# Patient Record
Sex: FEMALE | Race: WHITE | HISPANIC OR LATINO
[De-identification: names, ages, dates, MRNs, and addresses within clinical notes are randomized per-mention and may not be internally consistent; named-entity substitution may affect disease eponyms.]

---

## 2017-11-21 ENCOUNTER — APPOINTMENT (OUTPATIENT)
Dept: ORTHOPEDIC SURGERY | Facility: CLINIC | Age: 47
End: 2017-11-21
Payer: MEDICARE

## 2017-11-21 VITALS
WEIGHT: 180 LBS | HEART RATE: 84 BPM | SYSTOLIC BLOOD PRESSURE: 117 MMHG | BODY MASS INDEX: 31.89 KG/M2 | HEIGHT: 63 IN | DIASTOLIC BLOOD PRESSURE: 77 MMHG

## 2017-11-21 DIAGNOSIS — M47.817 SPONDYLOSIS W/OUT MYELOPATHY OR RADICULOPATHY, LUMBOSACRAL REGION: ICD-10-CM

## 2017-11-21 PROCEDURE — 72100 X-RAY EXAM L-S SPINE 2/3 VWS: CPT

## 2017-11-21 PROCEDURE — 99214 OFFICE O/P EST MOD 30 MIN: CPT

## 2017-11-25 ENCOUNTER — FORM ENCOUNTER (OUTPATIENT)
Age: 47
End: 2017-11-25

## 2017-11-26 ENCOUNTER — APPOINTMENT (OUTPATIENT)
Dept: MRI IMAGING | Facility: CLINIC | Age: 47
End: 2017-11-26
Payer: MEDICARE

## 2017-11-26 ENCOUNTER — OUTPATIENT (OUTPATIENT)
Dept: OUTPATIENT SERVICES | Facility: HOSPITAL | Age: 47
LOS: 1 days | End: 2017-11-26
Payer: MEDICARE

## 2017-11-26 DIAGNOSIS — Z98.1 ARTHRODESIS STATUS: ICD-10-CM

## 2017-11-26 PROCEDURE — 72148 MRI LUMBAR SPINE W/O DYE: CPT | Mod: 26

## 2017-11-26 PROCEDURE — 72148 MRI LUMBAR SPINE W/O DYE: CPT

## 2017-12-06 ENCOUNTER — APPOINTMENT (OUTPATIENT)
Dept: ORTHOPEDIC SURGERY | Facility: CLINIC | Age: 47
End: 2017-12-06
Payer: MEDICARE

## 2017-12-06 VITALS
WEIGHT: 180 LBS | HEART RATE: 79 BPM | DIASTOLIC BLOOD PRESSURE: 80 MMHG | SYSTOLIC BLOOD PRESSURE: 122 MMHG | BODY MASS INDEX: 31.89 KG/M2 | HEIGHT: 63 IN

## 2017-12-06 DIAGNOSIS — M70.61 TROCHANTERIC BURSITIS, RIGHT HIP: ICD-10-CM

## 2017-12-06 PROCEDURE — 99213 OFFICE O/P EST LOW 20 MIN: CPT | Mod: 25

## 2017-12-06 PROCEDURE — 20605 DRAIN/INJ JOINT/BURSA W/O US: CPT | Mod: 50

## 2017-12-06 RX ORDER — FLUOXETINE HYDROCHLORIDE 20 MG/1
20 CAPSULE ORAL
Qty: 15 | Refills: 0 | Status: DISCONTINUED | COMMUNITY
Start: 2017-09-25 | End: 2017-12-06

## 2017-12-06 RX ORDER — PREDNISONE 5 MG/1
5 TABLET ORAL
Qty: 28 | Refills: 0 | Status: DISCONTINUED | COMMUNITY
Start: 2017-09-20 | End: 2017-12-06

## 2017-12-06 RX ORDER — CYCLOBENZAPRINE HYDROCHLORIDE 5 MG/1
5 TABLET, FILM COATED ORAL
Qty: 30 | Refills: 0 | Status: DISCONTINUED | COMMUNITY
Start: 2017-06-22 | End: 2017-12-06

## 2017-12-06 RX ORDER — TOPIRAMATE 50 MG/1
50 TABLET, FILM COATED ORAL
Qty: 60 | Refills: 0 | Status: DISCONTINUED | COMMUNITY
Start: 2017-08-28 | End: 2017-12-06

## 2017-12-06 RX ORDER — TOPIRAMATE 100 MG/1
100 CAPSULE, EXTENDED RELEASE ORAL
Qty: 30 | Refills: 0 | Status: DISCONTINUED | COMMUNITY
Start: 2017-07-07 | End: 2017-12-06

## 2017-12-06 RX ORDER — IBUPROFEN 600 MG/1
600 TABLET, FILM COATED ORAL
Qty: 30 | Refills: 0 | Status: DISCONTINUED | COMMUNITY
Start: 2017-11-15 | End: 2017-12-06

## 2017-12-06 RX ORDER — IBUPROFEN 800 MG/1
800 TABLET, FILM COATED ORAL
Qty: 30 | Refills: 0 | Status: DISCONTINUED | COMMUNITY
Start: 2017-11-14 | End: 2017-12-06

## 2017-12-06 RX ORDER — METHOCARBAMOL 500 MG/1
500 TABLET, FILM COATED ORAL
Qty: 36 | Refills: 0 | Status: DISCONTINUED | COMMUNITY
Start: 2017-11-14 | End: 2017-12-06

## 2017-12-06 RX ORDER — DULOXETINE HYDROCHLORIDE 30 MG/1
30 CAPSULE, DELAYED RELEASE PELLETS ORAL
Qty: 28 | Refills: 0 | Status: DISCONTINUED | COMMUNITY
Start: 2017-09-20 | End: 2017-12-06

## 2017-12-06 RX ORDER — METHYLPREDNISOLONE 4 MG/1
4 TABLET ORAL
Qty: 21 | Refills: 0 | Status: DISCONTINUED | COMMUNITY
Start: 2017-11-21 | End: 2017-12-06

## 2017-12-06 RX ORDER — FLUOXETINE HYDROCHLORIDE 40 MG/1
40 CAPSULE ORAL
Qty: 30 | Refills: 0 | Status: DISCONTINUED | COMMUNITY
Start: 2017-06-19 | End: 2017-12-06

## 2017-12-06 RX ORDER — BUPROPION HYDROCHLORIDE 100 MG/1
100 TABLET, FILM COATED, EXTENDED RELEASE ORAL
Qty: 30 | Refills: 0 | Status: DISCONTINUED | COMMUNITY
Start: 2017-06-19 | End: 2017-12-06

## 2017-12-06 RX ORDER — AMOXICILLIN AND CLAVULANATE POTASSIUM 875; 125 MG/1; MG/1
875-125 TABLET, COATED ORAL
Qty: 10 | Refills: 0 | Status: DISCONTINUED | COMMUNITY
Start: 2017-07-24 | End: 2017-12-06

## 2018-02-13 ENCOUNTER — APPOINTMENT (OUTPATIENT)
Dept: ORTHOPEDIC SURGERY | Facility: CLINIC | Age: 48
End: 2018-02-13

## 2019-09-20 ENCOUNTER — APPOINTMENT (OUTPATIENT)
Dept: ORTHOPEDIC SURGERY | Facility: CLINIC | Age: 49
End: 2019-09-20
Payer: MEDICARE

## 2019-09-20 VITALS
BODY MASS INDEX: 31.89 KG/M2 | SYSTOLIC BLOOD PRESSURE: 135 MMHG | HEART RATE: 88 BPM | HEIGHT: 63 IN | DIASTOLIC BLOOD PRESSURE: 80 MMHG | WEIGHT: 180 LBS

## 2019-09-20 DIAGNOSIS — Z82.69 FAMILY HISTORY OF OTHER DISEASES OF THE MUSCULOSKELETAL SYSTEM AND CONNECTIVE TISSUE: ICD-10-CM

## 2019-09-20 PROCEDURE — 72100 X-RAY EXAM L-S SPINE 2/3 VWS: CPT

## 2019-09-20 PROCEDURE — 99214 OFFICE O/P EST MOD 30 MIN: CPT

## 2019-09-20 NOTE — PHYSICAL EXAM
[de-identified] : CONSTITUTIONAL: The patient is a very pleasant individual who is well-nourished and who appears stated age.\par PSYCHIATRIC: The patient is alert and oriented X 3 and in no apparent distress, and participates with orthopedic evaluation well.\par HEAD: Atraumatic and is nonsyndromic in appearance.\par EENT: No visible thyromegaly, EOMI.\par RESPIRATORY: Respiratory rate is regular, not dyspneic on examination.\par LYMPHATICS: There is no inguinal lymphadenopathy\par INTEGUMENTARY: Skin is clean, dry, and intact about the bilateral lower extremities and lumbar spine.\par VASCULAR: There is brisk capillary refill about the bilateral lower extremities.\par NEUROLOGIC: There are no pathologic reflexes. There is no decrease in sensation of the bilateral lower extremities on Wartenberg pinwheel examination. Deep tendon reflexes are well maintained at 2+/4 of the bilateral lower extremities and are symmetric. Left sciatica type symptoms.\par MUSCULOSKELETAL: There is no visible muscular atrophy. Manual motor strength is well maintained in the bilateral lower extremities. Range of motion of lumbar spine is well maintained. The patient ambulates in a non-myelopathic manner. Positive tension sign and straight leg raise on left. Quad extension, ankle dorsiflexion, EHL, plantar flexion, and ankle eversion are well preserved. Normal secondary orthopaedic exam of bilateral hips, greater trochanteric area, knees and ankles.\par LLE pain.  [de-identified] : 4 view x ray of the lumbar spine taken today shows L5-S1 fusion. Mild progression of adjacent level disease.

## 2019-09-20 NOTE — ADDENDUM
[FreeTextEntry1] : Documented by Geetha Mac acting as a scribe for Dr. Humphrey Bhagat. 09/20/2019\par \par All medical record entries made by the Scribe were at my, Dr. Humphrey Bhagat, direction and personally dictated by me on 09/20/2019. I have reviewed the chart and agree that the record accurately reflects my personal performance of the history, physical exam, assessment and plan. I have also personally directed, reviewed, and agreed with the chart.

## 2019-09-20 NOTE — DISCUSSION/SUMMARY
[de-identified] : Based upon failure of medical massage, oral medication, and injection therapy a lumbar MRI has been ordered secondary to persistent pain and is medically necessary to determine a further treatment plan as a result of worsening sciatic pain and worsening DDD/ adjacent level disease. Injection therapy versus spinal surgery. The pt will follow up once MRI has been obtained.\par

## 2019-09-20 NOTE — REVIEW OF SYSTEMS
[Joint Pain] : joint pain [Fever] : no fever [Chills] : no chills [Cough] : no cough [SOB on Exertion] : no shortness of breath on exertion
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
## 2019-09-27 ENCOUNTER — FORM ENCOUNTER (OUTPATIENT)
Age: 49
End: 2019-09-27

## 2019-09-28 ENCOUNTER — APPOINTMENT (OUTPATIENT)
Dept: MRI IMAGING | Facility: CLINIC | Age: 49
End: 2019-09-28
Payer: MEDICARE

## 2019-09-28 ENCOUNTER — OUTPATIENT (OUTPATIENT)
Dept: OUTPATIENT SERVICES | Facility: HOSPITAL | Age: 49
LOS: 1 days | End: 2019-09-28
Payer: MEDICARE

## 2019-09-28 DIAGNOSIS — M51.26 OTHER INTERVERTEBRAL DISC DISPLACEMENT, LUMBAR REGION: ICD-10-CM

## 2019-09-28 PROCEDURE — 72148 MRI LUMBAR SPINE W/O DYE: CPT

## 2019-09-28 PROCEDURE — 72148 MRI LUMBAR SPINE W/O DYE: CPT | Mod: 26

## 2019-10-31 ENCOUNTER — APPOINTMENT (OUTPATIENT)
Dept: ORTHOPEDIC SURGERY | Facility: CLINIC | Age: 49
End: 2019-10-31
Payer: MEDICARE

## 2019-10-31 VITALS
HEIGHT: 63 IN | SYSTOLIC BLOOD PRESSURE: 130 MMHG | HEART RATE: 81 BPM | BODY MASS INDEX: 31.89 KG/M2 | WEIGHT: 180 LBS | DIASTOLIC BLOOD PRESSURE: 85 MMHG

## 2019-10-31 DIAGNOSIS — Z80.9 FAMILY HISTORY OF MALIGNANT NEOPLASM, UNSPECIFIED: ICD-10-CM

## 2019-10-31 PROCEDURE — 99214 OFFICE O/P EST MOD 30 MIN: CPT

## 2019-10-31 NOTE — DISCUSSION/SUMMARY
[de-identified] : I have recommended that the pt continue with a conservative treatment plan. Pt has been referred to physical therapy for decreased pain modalities, core strengthening modalities and physical modalities. We also spoke about the benefits of using heat, ice, and Bengay cream. Home exercises such as stretching, weight bearing exercises, and aerobic conditioning were encouraged. She may follow up 4-6 months/PRN for repeat clinical evaluation/ surgical conservation regarding adjacent level disease. \par

## 2019-10-31 NOTE — PHYSICAL EXAM
[de-identified] : CONSTITUTIONAL: The patient is a very pleasant individual who is well-nourished and who appears stated age.\par PSYCHIATRIC: The patient is alert and oriented X 3 and in no apparent distress, and participates with orthopedic evaluation well.\par HEAD: Atraumatic and is nonsyndromic in appearance.\par EENT: No visible thyromegaly, EOMI.\par RESPIRATORY: Respiratory rate is regular, not dyspneic on examination.\par LYMPHATICS: There is no inguinal lymphadenopathy\par INTEGUMENTARY: Skin is clean, dry, and intact about the bilateral lower extremities and lumbar spine.\par VASCULAR: There is brisk capillary refill about the bilateral lower extremities.\par NEUROLOGIC: There are no pathologic reflexes. There is no decrease in sensation of the bilateral lower extremities on Wartenberg pinwheel examination. Deep tendon reflexes are well maintained at 2+/4 of the bilateral lower extremities and are symmetric. Left sciatica type symptoms.\par MUSCULOSKELETAL: There is no visible muscular atrophy. Manual motor strength is well maintained in the bilateral lower extremities. Range of motion of lumbar spine is well maintained. The patient ambulates in a non-myelopathic manner. Positive tension sign and straight leg raise on left. Quad extension, ankle dorsiflexion, EHL, plantar flexion, and ankle eversion are well preserved. Normal secondary orthopaedic exam of bilateral hips, greater trochanteric area, knees and ankles.\par Mechanically oriented low back pain. Chronic LLE radiculopathy. [de-identified] : MRI of lumbar spine taken at [facility] 10/01/19 shows L5-S1 fusion with some mild L4-L5 DDD/adjacent level disease.
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
## 2021-01-05 ENCOUNTER — APPOINTMENT (OUTPATIENT)
Dept: ORTHOPEDIC SURGERY | Facility: CLINIC | Age: 51
End: 2021-01-05
Payer: COMMERCIAL

## 2021-01-05 VITALS
BODY MASS INDEX: 33.66 KG/M2 | DIASTOLIC BLOOD PRESSURE: 85 MMHG | HEART RATE: 81 BPM | HEIGHT: 63 IN | SYSTOLIC BLOOD PRESSURE: 123 MMHG | WEIGHT: 190 LBS

## 2021-01-05 PROCEDURE — 99214 OFFICE O/P EST MOD 30 MIN: CPT

## 2021-01-05 PROCEDURE — 72100 X-RAY EXAM L-S SPINE 2/3 VWS: CPT

## 2021-01-05 PROCEDURE — 99072 ADDL SUPL MATRL&STAF TM PHE: CPT

## 2021-01-05 NOTE — DISCUSSION/SUMMARY
[de-identified] : A CAT scan has been ordered and is medically necessary due to persistent pain, failure of conservative measures such as PT and injection therapy, and to further assess the cause of these LE complaints. CAT scan will help guide treatment plan, possible surgical intervention vs injection therapy with pain management. We discussed the 30% chance of improvement with surgical intervention and the patient wishes to think about her current options. The patient will follow up in three weeks for repeat clinical assessment.

## 2021-01-05 NOTE — ADDENDUM
[FreeTextEntry1] : Documented by Ethan Dewitt acting as a scribe for Dee Horton  on 08/20/2020. All medical record entries made by the Scribe were at my, Dee Horton , direction and personally dictated by me on 08/20/2020 . I have reviewed the chart and agree that the record accurately reflects my personal performance of the history, physical exam, assessment and plan. I have also personally directed, reviewed, and agreed with the chart.

## 2021-01-05 NOTE — PHYSICAL EXAM
[Obese] : obese [Poor Appearance] : well-appearing [Acute Distress] : not in acute distress [de-identified] : CONSTITUTIONAL: The patient is a very pleasant individual who is well-nourished and who appears stated age.\par PSYCHIATRIC: The patient is alert and oriented X 3 and in no apparent distress, and participates with orthopedic evaluation well.\par HEAD: Atraumatic and is nonsyndromic in appearance.\par EENT: No visible thyromegaly, EOMI.\par RESPIRATORY: Respiratory rate is regular, not dyspneic on examination.\par LYMPHATICS: There is no inguinal lymphadenopathy\par INTEGUMENTARY: Skin is clean, dry, and intact about the bilateral lower extremities and lumbar spine.\par VASCULAR: There is brisk capillary refill about the bilateral lower extremities.\par NEUROLOGIC: There are no pathologic reflexes. There is no decrease in sensation of the bilateral lower extremities on Wartenberg pinwheel examination. Deep tendon reflexes are well maintained at 2+/4 of the bilateral lower extremities and are symmetric.\par MUSCULOSKELETAL: There is no visible muscular atrophy. Manual motor strength is well maintained in the bilateral lower extremities. The patient ambulates in a non-myelopathic manner. Positive tension sign bilaterally and straight leg raise bilaterally. Quad extension, ankle dorsiflexion, EHL, plantar flexion, and ankle eversion are well preserved. Normal secondary orthopaedic exam of bilateral hips, greater trochanteric area, knees and ankles. Tenderness with palpation to the lower lumbar region. Right greater trochanteric bursitis. Pain with forward flexion greater than 30 °.  [de-identified] : MRI of the lumbar spine taken at [facility] on 09/28/2019 demonstrates mild adjacent level disease at L4-L5, some advancement of L4-L5 lumbar degenerative disc disease when compared to 2014. Mild advancement from 2016 and 2017, this appears to be the natural progression of adjacent level disease. \par \par Xray of the lumbar spine taken 01/05/2021 demonstrates lumbar fusion L5-S1, appears to be well fused. Compared to 2019 there appears to be progression o lumbar degenerative disc disease. When compared to 2015 images taken immediately post operatively there appear to be progression of degenerative disc disease as well. \par \par MRI of the lumbar spine taken at [facility] demonstrates well positioned L5-S1 fusion with adjacent level disease, no foraminal stenosis.
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
## 2021-09-02 ENCOUNTER — APPOINTMENT (OUTPATIENT)
Dept: ORTHOPEDIC SURGERY | Facility: CLINIC | Age: 51
End: 2021-09-02
Payer: MEDICARE

## 2021-09-02 VITALS
HEIGHT: 63 IN | SYSTOLIC BLOOD PRESSURE: 112 MMHG | BODY MASS INDEX: 33.66 KG/M2 | WEIGHT: 190 LBS | HEART RATE: 81 BPM | DIASTOLIC BLOOD PRESSURE: 79 MMHG

## 2021-09-02 PROCEDURE — 72100 X-RAY EXAM L-S SPINE 2/3 VWS: CPT

## 2021-09-02 PROCEDURE — 99215 OFFICE O/P EST HI 40 MIN: CPT

## 2021-09-02 NOTE — ADDENDUM
[FreeTextEntry1] : Documented by Ethan Dewitt acting as a scribe for Dr. Humphrey Bhagat on 09/02/2021. All medical record entries made by the Scribe were at my, Dr. Humphrey Bhagat, direction and personally dictated by me on 09/02/2021 . I have reviewed the chart and agree that the record accurately reflects my personal performance of the history, physical exam, assessment and plan. I have also personally directed, reviewed, and agreed with the chart.

## 2021-09-02 NOTE — DISCUSSION/SUMMARY
[de-identified] : We talked about the nature of the condition and treatment options. Anticipatory guidance regarding mechanically oriented lower back pain was given. \par A lumbar MRI and CT scan has been ordered and is medically necessary due to persistent pain, failure of conservative measures such as physical therapy and injection therapy since 07- with no relief, further assess adjacent level disease, assist in the decision of fusion extension, and to further assess the cause of this mechanically oriented lower back pain. MRI will help guide treatment plan, possible surgical intervention vs injection therapy with pain management. The patient will follow up after the MRI results have been obtained. \par \par Prior to appointment and during encounter with patient extensive medical records were reviewed including but not limited to, hospital records, out patient records, imaging results, and lab data. During this appointment the patient was examined, diagnoses were discussed and explained in a face to face manner. In addition extensive time was spent reviewing aforementioned diagnostic studies. Counseling including abnormal image results, differential diagnoses, treatment options, risk and benefits, lifestyle changes, current condition, and current medications was performed. Patient's comments, questions, and concerns were address and patient verbalized understanding. Based on this patient's presentation at our office, which is an orthopedic spine surgeon's office, this patient inherently / intrinsically has a risk, however minute, of developing  issues such as Cauda equina syndrome, bowel and bladder changes, or progression of motor or neurological deficits such as paralysis which may be permanent. \par \par Based on persistent pain and failure of conservative care I believe an extension of lumbar fusion may be warranted however this is pending imaging results. Anatomic models, Xrays, CT scans/MRI’s were utilized to provide a firm understanding of their surgical plan. Patient is aware that surgery is elective in nature and he choosing to proceed with surgery. Risks, benefits, alternatives were discussed and all questions, comments and concerns were encouraged and answered to the patient's satisfaction. The statistical probability of improvement was discussed at length as well as post surgical course. Literature from [employer] spine society was provided to the patient regarding the specific type of surgery as well as a 5 page written surgical consent which the patient will need to sign and return to the office prior to surgical date. Consent forms highlight specific complications related to the complex nature of spinal surgery\par  \par Risks of lumbar surgery include: persistent pain, adjacent segment disease (which will require more surgery in future), dural tears, neurologic injury, and wound healing complications\par  \par Benefits of lumbar surgery include Improved neurologic and pain score\par  \par We also discussed with the patient complications of incisions directly related to obesity, diabetes, previous wound complications or post-surgical wound infections, smoking, neuropathy, and chronic anticoagulation. This risk has been specifically discussed and the patient will discuss modifiable risk factors to be optimized prior to surgical management. A multimodality approach of primary care physician, and medicine subspecialist will be utilized to optimize medical risk factors.\par  \par If patient is a smoker, discontinuation of smoking was advised and must be accomplished 6-8 weeks prior to surgery date. Patient was advised that help with quitting smoking is available through [facility] Smoker's Quit Line and phone number/website was provided, or patient can ask assistance from primary care provider. Elective surgery will not be performed unless patient complies with this policy.\par \par Medical comorbidities including but not limited to diabetes, coronary artery disease, renal insufficiency, uncontrolled hypertension, rheumatoid arthritis, auto-immune disorders, COPD/asthma and/or history of radiation, chemotherapy, being on anticoagulants, chronic steroids, immune modulators, have increased statistical chance of leading to increased hospital stay, protracted recovery period, need for acute or subacute rehabilitation post-operative. There can be increased probability of post-surgical and medical complications including but not limited to surgical site infection, need for revision surgery, deep vein thrombosis, pulmonary embolism, exacerbation of COPD/asthma, pneumonia, UTI, urinary retention, and ileus.

## 2021-09-02 NOTE — PHYSICAL EXAM
[Obese] : obese [Poor Appearance] : well-appearing [Acute Distress] : not in acute distress [de-identified] : CONSTITUTIONAL: The patient is a very pleasant individual who is well-nourished and who appears stated age.\par PSYCHIATRIC: The patient is alert and oriented X 3 and in no apparent distress, and participates with orthopedic evaluation well.\par HEAD: Atraumatic and is nonsyndromic in appearance.\par EENT: No visible thyromegaly, EOMI.\par RESPIRATORY: Respiratory rate is regular, not dyspneic on examination.\par LYMPHATICS: There is no inguinal lymphadenopathy\par INTEGUMENTARY: Skin is clean, dry, and intact about the bilateral lower extremities and lumbar spine.\par VASCULAR: There is brisk capillary refill about the bilateral lower extremities.\par NEUROLOGIC: There are no pathologic reflexes. There is no decrease in sensation of the bilateral lower extremities on Wartenberg pinwheel examination. Deep tendon reflexes are well maintained at 2+/4 of the bilateral lower extremities and are symmetric.\par MUSCULOSKELETAL: There is no visible muscular atrophy. Manual motor strength is well maintained in the bilateral lower extremities. mechanically oriented lower back pain . The patient ambulates in a non-myelopathic manner. Negative tension sign and straight leg raise bilaterally. Quad extension, ankle dorsiflexion, EHL, plantar flexion, and ankle eversion are well preserved. Normal secondary orthopaedic exam of bilateral hips, greater trochanteric area, knees and ankles. No pain with internal or external rotation of the bilateral hips.  [de-identified] : MRI of the lumbar spine taken at [facility] on 09/28/2019 demonstrates mild adjacent level disease at L4-L5, some advancement of L4-L5 lumbar degenerative disc disease when compared to 2014. Mild advancement from 2016 and 2017, this appears to be the natural progression of adjacent level disease. \par \par Xray of the lumbar spine taken 01/05/2021 demonstrates lumbar fusion L5-S1, appears to be well fused. Compared to 2019 there appears to be progression o lumbar degenerative disc disease. When compared to 2015 images taken immediately post operatively there appear to be progression of degenerative disc disease as well. \par \par MRI of the lumbar spine taken at [facility] demonstrates well positioned L5-S1 fusion with adjacent level disease, no foraminal stenosis.\par \par Xray of the lumbar spine taken 09/02/2021 demonstrates S/p L5-S1 fusion that is well fused anteriorly. Appears to be adjacent level disease. Compared to 2014 there is significant change in L4-L5 adjacent level disease and significant maturing of the L5-S1 fusion.
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
## 2021-09-23 NOTE — HISTORY OF PRESENT ILLNESS
[de-identified] : 51 year old F Presents for follow up evaluation of an acute exacerbation of chronic  [Ataxia] : no ataxia [Incontinence] : no incontinence [Loss of Dexterity] : good dexterity [Urinary Ret.] : no urinary retention

## 2021-09-23 NOTE — DISCUSSION/SUMMARY
[de-identified] : We talked about the nature of the condition and treatment options. Anticipatory guidance regarding \par \par Prior to appointment and during encounter with patient extensive medical records were reviewed including but not limited to, hospital records, out patient records, imaging results, and lab data. During this appointment the patient was examined, diagnoses were discussed and explained in a face to face manner. In addition extensive time was spent reviewing aforementioned diagnostic studies. Counseling including abnormal image results, differential diagnoses, treatment options, risk and benefits, lifestyle changes, current condition, and current medications was performed. Patient's comments, questions, and concerns were address and patient verbalized understanding. Based on this patient's presentation at our office, which is an orthopedic spine surgeon's office, this patient inherently / intrinsically has a risk, however minute, of developing  issues such as Cauda equina syndrome, bowel and bladder changes, or progression of motor or neurological deficits such as paralysis which may be permanent.

## 2021-09-23 NOTE — PHYSICAL EXAM
[Poor Appearance] : well-appearing [Acute Distress] : not in acute distress [Obese] : obese [de-identified] : CONSTITUTIONAL: The patient is a very pleasant individual who is well-nourished and who appears stated age.\par PSYCHIATRIC: The patient is alert and oriented X 3 and in no apparent distress, and participates with orthopedic evaluation well.\par HEAD: Atraumatic and is nonsyndromic in appearance.\par EENT: No visible thyromegaly, EOMI.\par RESPIRATORY: Respiratory rate is regular, not dyspneic on examination.\par LYMPHATICS: There is no inguinal lymphadenopathy\par INTEGUMENTARY: Skin is clean, dry, and intact about the bilateral lower extremities and lumbar spine.\par VASCULAR: There is brisk capillary refill about the bilateral lower extremities.\par NEUROLOGIC: There are no pathologic reflexes. There is no decrease in sensation of the bilateral lower extremities on Wartenberg pinwheel examination. Deep tendon reflexes are well maintained at 2+/4 of the bilateral lower extremities and are symmetric.\par MUSCULOSKELETAL: There is no visible muscular atrophy. Manual motor strength is well maintained in the bilateral lower extremities. mechanically oriented lower back pain . The patient ambulates in a non-myelopathic manner. Negative tension sign and straight leg raise bilaterally. Quad extension, ankle dorsiflexion, EHL, plantar flexion, and ankle eversion are well preserved. Normal secondary orthopaedic exam of bilateral hips, greater trochanteric area, knees and ankles. No pain with internal or external rotation of the bilateral hips.  [de-identified] : MRI of the lumbar spine taken at [facility] on 09- demonstrates \par \par CT scan of the lumbar spine taken at [facility] radiology on 09- demonstrates\par \par MRI of the lumbar spine taken at [facility] on 09/28/2019 demonstrates mild adjacent level disease at L4-L5, some advancement of L4-L5 lumbar degenerative disc disease when compared to 2014. Mild advancement from 2016 and 2017, this appears to be the natural progression of adjacent level disease. \par \par Xray of the lumbar spine taken 01/05/2021 demonstrates lumbar fusion L5-S1, appears to be well fused. Compared to 2019 there appears to be progression o lumbar degenerative disc disease. When compared to 2015 images taken immediately post operatively there appear to be progression of degenerative disc disease as well. \par \par MRI of the lumbar spine taken at [facility] demonstrates well positioned L5-S1 fusion with adjacent level disease, no foraminal stenosis.\par \par Xray of the lumbar spine taken 09/02/2021 demonstrates S/p L5-S1 fusion that is well fused anteriorly. Appears to be adjacent level disease. Compared to 2014 there is significant change in L4-L5 adjacent level disease and significant maturing of the L5-S1 fusion.

## 2021-09-27 ENCOUNTER — APPOINTMENT (OUTPATIENT)
Dept: INTERNAL MEDICINE | Facility: CLINIC | Age: 51
End: 2021-09-27
Payer: MEDICARE

## 2021-09-27 VITALS
DIASTOLIC BLOOD PRESSURE: 82 MMHG | BODY MASS INDEX: 32.78 KG/M2 | RESPIRATION RATE: 16 BRPM | TEMPERATURE: 98.9 F | HEIGHT: 63 IN | OXYGEN SATURATION: 96 % | HEART RATE: 79 BPM | WEIGHT: 185 LBS | SYSTOLIC BLOOD PRESSURE: 118 MMHG

## 2021-09-27 DIAGNOSIS — Z23 ENCOUNTER FOR IMMUNIZATION: ICD-10-CM

## 2021-09-27 DIAGNOSIS — Z00.00 ENCOUNTER FOR GENERAL ADULT MEDICAL EXAMINATION W/OUT ABNORMAL FINDINGS: ICD-10-CM

## 2021-09-27 PROCEDURE — G0008: CPT

## 2021-09-27 PROCEDURE — 99215 OFFICE O/P EST HI 40 MIN: CPT | Mod: 25

## 2021-09-27 PROCEDURE — 90686 IIV4 VACC NO PRSV 0.5 ML IM: CPT

## 2021-09-27 RX ORDER — PRAZOSIN HYDROCHLORIDE 2 MG/1
2 CAPSULE ORAL
Qty: 30 | Refills: 0 | Status: DISCONTINUED | COMMUNITY
Start: 2017-06-19 | End: 2021-09-27

## 2021-09-27 RX ORDER — QUETIAPINE FUMARATE 100 MG/1
100 TABLET ORAL
Qty: 30 | Refills: 0 | Status: DISCONTINUED | COMMUNITY
Start: 2017-06-19 | End: 2021-09-27

## 2021-09-27 RX ORDER — TOPIRAMATE 200 MG/1
200 CAPSULE, EXTENDED RELEASE ORAL
Qty: 30 | Refills: 0 | Status: DISCONTINUED | COMMUNITY
Start: 2017-11-10 | End: 2021-09-27

## 2021-09-27 RX ORDER — BUSPIRONE HYDROCHLORIDE 15 MG/1
15 TABLET ORAL
Qty: 90 | Refills: 0 | Status: DISCONTINUED | COMMUNITY
Start: 2017-06-21 | End: 2021-09-27

## 2021-09-27 RX ORDER — PEN NEEDLE, DIABETIC 32GX 5/32"
31G X 6 MM NEEDLE, DISPOSABLE MISCELLANEOUS
Qty: 100 | Refills: 0 | Status: DISCONTINUED | COMMUNITY
Start: 2017-11-09 | End: 2021-09-27

## 2021-09-27 RX ORDER — LIRAGLUTIDE 6 MG/ML
18 INJECTION SUBCUTANEOUS
Qty: 9 | Refills: 0 | Status: DISCONTINUED | COMMUNITY
Start: 2017-11-09 | End: 2021-09-27

## 2021-09-27 RX ORDER — PRAZOSIN HYDROCHLORIDE 1 MG/1
1 CAPSULE ORAL
Qty: 30 | Refills: 0 | Status: DISCONTINUED | COMMUNITY
Start: 2017-06-19 | End: 2021-09-27

## 2021-09-27 RX ORDER — OMEPRAZOLE 40 MG/1
40 CAPSULE, DELAYED RELEASE ORAL
Qty: 30 | Refills: 0 | Status: DISCONTINUED | COMMUNITY
Start: 2017-06-22 | End: 2021-09-27

## 2021-09-27 NOTE — PHYSICAL EXAM
[Regular Rhythm] : with a regular rhythm [Normal S1, S2] : normal S1 and S2 [No Extremity Clubbing/Cyanosis] : no extremity clubbing/cyanosis [Soft] : abdomen soft [Non Tender] : non-tender [Non-distended] : non-distended [Normal Bowel Sounds] : normal bowel sounds [No CVA Tenderness] : no CVA  tenderness [Normal] : no rash [No Focal Deficits] : no focal deficits [Normal Gait] : normal gait [de-identified] : Multiple surgical scars low back

## 2021-09-27 NOTE — HEALTH RISK ASSESSMENT
[Never (0 pts)] : Never (0 points) [No] : In the past 12 months have you used drugs other than those required for medical reasons? No [Patient reported mammogram was normal] : Patient reported mammogram was normal [] : No [MammogramDate] : 09/21 [ColonoscopyDate] : 01/15

## 2021-09-27 NOTE — PLAN
[FreeTextEntry1] : \par \par Do FBW.\par \par Flu vax today\par \par Discussed Shingrix vaccination.  Outlined benefits and risks and current recommendation.  \par He will consider and obtain vaccination at his local pharmacy.\par \par See GI  for rectal bleeding. \par \par Do FBW\par \par Psych  FU advised.  Pt is on a regimen of numerous psychiatric meds which require close FU.  Referrals provided.\par \par See Neurologist for persistent migraines.  Referrals provided.  \par \par FU 3 months for BP check, EKG\par \par FU CPEDr.Gasper scheduled 6 months.   \par
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
## 2021-09-28 ENCOUNTER — APPOINTMENT (OUTPATIENT)
Dept: ORTHOPEDIC SURGERY | Facility: CLINIC | Age: 51
End: 2021-09-28
Payer: MEDICARE

## 2021-10-13 DIAGNOSIS — B00.9 HERPESVIRAL INFECTION, UNSPECIFIED: ICD-10-CM

## 2021-10-14 ENCOUNTER — APPOINTMENT (OUTPATIENT)
Dept: ORTHOPEDIC SURGERY | Facility: CLINIC | Age: 51
End: 2021-10-14
Payer: MEDICARE

## 2021-10-14 VITALS
DIASTOLIC BLOOD PRESSURE: 71 MMHG | WEIGHT: 185 LBS | SYSTOLIC BLOOD PRESSURE: 110 MMHG | HEIGHT: 63 IN | BODY MASS INDEX: 32.78 KG/M2 | HEART RATE: 75 BPM

## 2021-10-14 PROCEDURE — 99215 OFFICE O/P EST HI 40 MIN: CPT

## 2021-10-18 LAB
25(OH)D3 SERPL-MCNC: 27.9 NG/ML
ALBUMIN SERPL ELPH-MCNC: 4.4 G/DL
ALP BLD-CCNC: 129 U/L
ALT SERPL-CCNC: 19 U/L
ANION GAP SERPL CALC-SCNC: 11 MMOL/L
APPEARANCE: CLEAR
AST SERPL-CCNC: 18 U/L
BACTERIA: NEGATIVE
BASOPHILS # BLD AUTO: 0.04 K/UL
BASOPHILS NFR BLD AUTO: 0.5 %
BILIRUB SERPL-MCNC: <0.2 MG/DL
BILIRUBIN URINE: NEGATIVE
BLOOD URINE: NEGATIVE
BUN SERPL-MCNC: 13 MG/DL
CALCIUM SERPL-MCNC: 9.5 MG/DL
CHLORIDE SERPL-SCNC: 106 MMOL/L
CHOLEST SERPL-MCNC: 162 MG/DL
CO2 SERPL-SCNC: 28 MMOL/L
COLOR: YELLOW
CREAT SERPL-MCNC: 0.64 MG/DL
EOSINOPHIL # BLD AUTO: 0.17 K/UL
EOSINOPHIL NFR BLD AUTO: 2.3 %
ESTIMATED AVERAGE GLUCOSE: 114 MG/DL
GLUCOSE QUALITATIVE U: NEGATIVE
GLUCOSE SERPL-MCNC: 113 MG/DL
HBA1C MFR BLD HPLC: 5.6 %
HCT VFR BLD CALC: 39.2 %
HDLC SERPL-MCNC: 54 MG/DL
HGB BLD-MCNC: 12.7 G/DL
HYALINE CASTS: 1 /LPF
IMM GRANULOCYTES NFR BLD AUTO: 0.3 %
KETONES URINE: NEGATIVE
LDLC SERPL CALC-MCNC: 68 MG/DL
LEUKOCYTE ESTERASE URINE: NEGATIVE
LYMPHOCYTES # BLD AUTO: 2.49 K/UL
LYMPHOCYTES NFR BLD AUTO: 33.8 %
MAN DIFF?: NORMAL
MCHC RBC-ENTMCNC: 32.3 PG
MCHC RBC-ENTMCNC: 32.4 GM/DL
MCV RBC AUTO: 99.7 FL
MICROSCOPIC-UA: NORMAL
MONOCYTES # BLD AUTO: 0.59 K/UL
MONOCYTES NFR BLD AUTO: 8 %
NEUTROPHILS # BLD AUTO: 4.05 K/UL
NEUTROPHILS NFR BLD AUTO: 55.1 %
NITRITE URINE: NEGATIVE
NONHDLC SERPL-MCNC: 108 MG/DL
PH URINE: 5.5
PLATELET # BLD AUTO: 266 K/UL
POTASSIUM SERPL-SCNC: 4.4 MMOL/L
PROT SERPL-MCNC: 6.7 G/DL
PROTEIN URINE: NORMAL
RBC # BLD: 3.93 M/UL
RBC # FLD: 12.3 %
RED BLOOD CELLS URINE: 3 /HPF
SODIUM SERPL-SCNC: 145 MMOL/L
SPECIFIC GRAVITY URINE: 1.02
SQUAMOUS EPITHELIAL CELLS: 4 /HPF
TRIGL SERPL-MCNC: 202 MG/DL
TSH SERPL-ACNC: 1.62 UIU/ML
UROBILINOGEN URINE: NORMAL
WBC # FLD AUTO: 7.36 K/UL
WHITE BLOOD CELLS URINE: 1 /HPF

## 2021-10-19 ENCOUNTER — NON-APPOINTMENT (OUTPATIENT)
Age: 51
End: 2021-10-19

## 2021-11-01 ENCOUNTER — OUTPATIENT (OUTPATIENT)
Dept: OUTPATIENT SERVICES | Facility: HOSPITAL | Age: 51
LOS: 1 days | End: 2021-11-01
Payer: MEDICARE

## 2021-11-01 VITALS
SYSTOLIC BLOOD PRESSURE: 119 MMHG | HEART RATE: 64 BPM | RESPIRATION RATE: 20 BRPM | DIASTOLIC BLOOD PRESSURE: 77 MMHG | HEIGHT: 63 IN | OXYGEN SATURATION: 98 % | TEMPERATURE: 97 F | WEIGHT: 197.75 LBS

## 2021-11-01 DIAGNOSIS — Z29.9 ENCOUNTER FOR PROPHYLACTIC MEASURES, UNSPECIFIED: ICD-10-CM

## 2021-11-01 DIAGNOSIS — Z01.818 ENCOUNTER FOR OTHER PREPROCEDURAL EXAMINATION: ICD-10-CM

## 2021-11-01 DIAGNOSIS — Z90.711 ACQUIRED ABSENCE OF UTERUS WITH REMAINING CERVICAL STUMP: Chronic | ICD-10-CM

## 2021-11-01 DIAGNOSIS — M54.16 RADICULOPATHY, LUMBAR REGION: ICD-10-CM

## 2021-11-01 DIAGNOSIS — M51.26 OTHER INTERVERTEBRAL DISC DISPLACEMENT, LUMBAR REGION: ICD-10-CM

## 2021-11-01 DIAGNOSIS — Z98.1 ARTHRODESIS STATUS: Chronic | ICD-10-CM

## 2021-11-01 DIAGNOSIS — Z13.89 ENCOUNTER FOR SCREENING FOR OTHER DISORDER: ICD-10-CM

## 2021-11-01 DIAGNOSIS — M47.816 SPONDYLOSIS WITHOUT MYELOPATHY OR RADICULOPATHY, LUMBAR REGION: ICD-10-CM

## 2021-11-01 LAB
A1C WITH ESTIMATED AVERAGE GLUCOSE RESULT: 5.7 % — HIGH (ref 4–5.6)
ALBUMIN SERPL ELPH-MCNC: 4.6 G/DL — SIGNIFICANT CHANGE UP (ref 3.3–5.2)
ALP SERPL-CCNC: 155 U/L — HIGH (ref 40–120)
ALT FLD-CCNC: 29 U/L — SIGNIFICANT CHANGE UP
ANION GAP SERPL CALC-SCNC: 12 MMOL/L — SIGNIFICANT CHANGE UP (ref 5–17)
APTT BLD: 29.5 SEC — SIGNIFICANT CHANGE UP (ref 27.5–35.5)
AST SERPL-CCNC: 30 U/L — SIGNIFICANT CHANGE UP
BASOPHILS # BLD AUTO: 0.03 K/UL — SIGNIFICANT CHANGE UP (ref 0–0.2)
BASOPHILS NFR BLD AUTO: 0.4 % — SIGNIFICANT CHANGE UP (ref 0–2)
BILIRUB SERPL-MCNC: 0.2 MG/DL — LOW (ref 0.4–2)
BLD GP AB SCN SERPL QL: SIGNIFICANT CHANGE UP
BUN SERPL-MCNC: 13.1 MG/DL — SIGNIFICANT CHANGE UP (ref 8–20)
CALCIUM SERPL-MCNC: 9.2 MG/DL — SIGNIFICANT CHANGE UP (ref 8.6–10.2)
CHLORIDE SERPL-SCNC: 104 MMOL/L — SIGNIFICANT CHANGE UP (ref 98–107)
CO2 SERPL-SCNC: 28 MMOL/L — SIGNIFICANT CHANGE UP (ref 22–29)
CREAT SERPL-MCNC: 0.5 MG/DL — SIGNIFICANT CHANGE UP (ref 0.5–1.3)
EOSINOPHIL # BLD AUTO: 0.21 K/UL — SIGNIFICANT CHANGE UP (ref 0–0.5)
EOSINOPHIL NFR BLD AUTO: 2.5 % — SIGNIFICANT CHANGE UP (ref 0–6)
ESTIMATED AVERAGE GLUCOSE: 117 MG/DL — HIGH (ref 68–114)
GLUCOSE SERPL-MCNC: 88 MG/DL — SIGNIFICANT CHANGE UP (ref 70–99)
HCT VFR BLD CALC: 37.6 % — SIGNIFICANT CHANGE UP (ref 34.5–45)
HGB BLD-MCNC: 12.7 G/DL — SIGNIFICANT CHANGE UP (ref 11.5–15.5)
IMM GRANULOCYTES NFR BLD AUTO: 0.2 % — SIGNIFICANT CHANGE UP (ref 0–1.5)
INR BLD: 0.95 RATIO — SIGNIFICANT CHANGE UP (ref 0.88–1.16)
LYMPHOCYTES # BLD AUTO: 2.79 K/UL — SIGNIFICANT CHANGE UP (ref 1–3.3)
LYMPHOCYTES # BLD AUTO: 32.9 % — SIGNIFICANT CHANGE UP (ref 13–44)
MCHC RBC-ENTMCNC: 31.8 PG — SIGNIFICANT CHANGE UP (ref 27–34)
MCHC RBC-ENTMCNC: 33.8 GM/DL — SIGNIFICANT CHANGE UP (ref 32–36)
MCV RBC AUTO: 94.2 FL — SIGNIFICANT CHANGE UP (ref 80–100)
MONOCYTES # BLD AUTO: 0.85 K/UL — SIGNIFICANT CHANGE UP (ref 0–0.9)
MONOCYTES NFR BLD AUTO: 10 % — SIGNIFICANT CHANGE UP (ref 2–14)
MRSA PCR RESULT.: SIGNIFICANT CHANGE UP
NEUTROPHILS # BLD AUTO: 4.59 K/UL — SIGNIFICANT CHANGE UP (ref 1.8–7.4)
NEUTROPHILS NFR BLD AUTO: 54 % — SIGNIFICANT CHANGE UP (ref 43–77)
PLATELET # BLD AUTO: 253 K/UL — SIGNIFICANT CHANGE UP (ref 150–400)
POTASSIUM SERPL-MCNC: 4.4 MMOL/L — SIGNIFICANT CHANGE UP (ref 3.5–5.3)
POTASSIUM SERPL-SCNC: 4.4 MMOL/L — SIGNIFICANT CHANGE UP (ref 3.5–5.3)
PROT SERPL-MCNC: 7.2 G/DL — SIGNIFICANT CHANGE UP (ref 6.6–8.7)
PROTHROM AB SERPL-ACNC: 11 SEC — SIGNIFICANT CHANGE UP (ref 10.6–13.6)
RBC # BLD: 3.99 M/UL — SIGNIFICANT CHANGE UP (ref 3.8–5.2)
RBC # FLD: 11.8 % — SIGNIFICANT CHANGE UP (ref 10.3–14.5)
S AUREUS DNA NOSE QL NAA+PROBE: SIGNIFICANT CHANGE UP
SODIUM SERPL-SCNC: 144 MMOL/L — SIGNIFICANT CHANGE UP (ref 135–145)
WBC # BLD: 8.49 K/UL — SIGNIFICANT CHANGE UP (ref 3.8–10.5)
WBC # FLD AUTO: 8.49 K/UL — SIGNIFICANT CHANGE UP (ref 3.8–10.5)

## 2021-11-01 PROCEDURE — 93010 ELECTROCARDIOGRAM REPORT: CPT

## 2021-11-01 PROCEDURE — 93005 ELECTROCARDIOGRAM TRACING: CPT

## 2021-11-01 PROCEDURE — G0463: CPT

## 2021-11-01 RX ORDER — DULOXETINE HYDROCHLORIDE 30 MG/1
1 CAPSULE, DELAYED RELEASE ORAL
Qty: 0 | Refills: 0 | DISCHARGE

## 2021-11-01 NOTE — H&P PST ADULT - NSICDXPASTSURGICALHX_GEN_ALL_CORE_FT
PAST SURGICAL HISTORY:  H/O spinal fusion L5-S1     PAST SURGICAL HISTORY:  H/O spinal fusion L5-S1    History of hysterectomy, supracervical

## 2021-11-01 NOTE — PATIENT PROFILE ADULT - FUNCTIONAL SCREEN CURRENT LEVEL: COMMUNICATION, MLM
Detail Level: Zone Add High Risk Medication Management Associated Diagnosis?: No 0 = understands/communicates without difficulty
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
## 2021-11-01 NOTE — PATIENT PROFILE ADULT - NSPROHMSYMPCOND_GEN_A_NUR
pre op teaching surgical scrub pain management and covid swab instructions given to pt    Spine surgery booklet given to pt  Pt advised to call Drs office with any questions/none

## 2021-11-01 NOTE — H&P PST ADULT - PROBLEM SELECTOR PLAN 3
ort score 1, low risk for substance abuse caprini score 4, moderate risk for dvt, SCD ordered, surgical team to assess for dvt prophylaxis

## 2021-11-01 NOTE — PATIENT PROFILE ADULT - NSPREOP1_ARRIVALTIME_GEN_A_NUR
Interventional Radiology Pre-Procedure Sedation Assessment   Time of Assessment: 9:48 AM    Expected Level: Moderate Sedation    Indication: Sedation is required for the following type of Procedure: GI: G-tube change    Sedation and procedural consent: Risks, benefits and alternatives were discussed with Patient    PO Intake: Appropriately NPO for procedure    ASA Class: Class 2 - MILD SYSTEMIC DISEASE, NO ACUTE PROBLEMS, NO FUNCTIONAL LIMITATIONS.    Mallampati: Grade 2:  Soft palate, base of uvula, tonsillar pillars, and portion of posterior pharyngeal wall visible    Lungs: Lungs Clear with good breath sounds bilaterally    Heart: Normal heart sounds and rate    History and physical reviewed and no updates needed. I have reviewed the lab findings, diagnostic data, medications, and the plan for sedation. I have determined this patient to be an appropriate candidate for the planned sedation and procedure and have reassessed the patient IMMEDIATELY PRIOR to sedation and procedure.    Wilfredo Rocha PA-C     11:00

## 2021-11-01 NOTE — H&P PST ADULT - NSICDXPASTMEDICALHX_GEN_ALL_CORE_FT
PAST MEDICAL HISTORY:  Depression     GERD (gastroesophageal reflux disease)     Hyperlipidemia     Hypertension     Lumbar radiculopathy     Lumbar spondylosis     Schizo affective schizophrenia      PAST MEDICAL HISTORY:  Depression     GERD (gastroesophageal reflux disease)     Hyperlipidemia     Hypertension     Lumbar herniated disc     Lumbar radiculopathy     Schizo affective schizophrenia      PAST MEDICAL HISTORY:  Depression     GERD (gastroesophageal reflux disease)     Hyperlipidemia     Hypertension     Lumbar herniated disc     Lumbar radiculopathy      PAST MEDICAL HISTORY:  Depression     GERD (gastroesophageal reflux disease)     Hyperlipidemia     Hypertension     Lumbar herniated disc     Lumbar radiculopathy     PTSD (post-traumatic stress disorder)

## 2021-11-01 NOTE — H&P PST ADULT - PROBLEM SELECTOR PLAN 1
preop assessment, medical clearance pending, revision of L5-S1 fusion, L4-L5 fusion with interbody and posterolateral fusion with Dr Bhagat scheduled for 11/17/2021

## 2021-11-01 NOTE — PATIENT PROFILE ADULT - VISION (WITH CORRECTIVE LENSES IF THE PATIENT USUALLY WEARS THEM):
glasses or contacts/Partially impaired: cannot see medication labels or newsprint, but can see obstacles in path, and the surrounding layout; can count fingers at arm's length

## 2021-11-01 NOTE — H&P PST ADULT - ATTENDING COMMENTS
pt presents for revision lumbar fusion secondary to adjacent segment dz, with resulting spondy and stenosis. Pt aware of incomplete resolution of s/sx, revision surgery and incidental durotomy, adjacent dz.
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
## 2021-11-01 NOTE — H&P PST ADULT - [id number]_GEN_ALL_CORE_FT
FAMILY HISTORY:  Mother  Still living? Unknown  Family history of osteoarthritis, Age at diagnosis: Age Unknown    Grandparent  Still living? Unknown  Family history of osteoarthritis, Age at diagnosis: Age Unknown     FAMILY HISTORY:  Mother  Still living? Unknown  Family history of osteoarthritis, Age at diagnosis: Age Unknown  FH: diabetes mellitus, Age at diagnosis: Age Unknown    Grandparent  Still living? Unknown  Family history of osteoarthritis, Age at diagnosis: Age Unknown

## 2021-11-08 ENCOUNTER — APPOINTMENT (OUTPATIENT)
Dept: INTERNAL MEDICINE | Facility: CLINIC | Age: 51
End: 2021-11-08
Payer: MEDICARE

## 2021-11-08 VITALS
SYSTOLIC BLOOD PRESSURE: 120 MMHG | OXYGEN SATURATION: 98 % | HEIGHT: 63 IN | HEART RATE: 74 BPM | DIASTOLIC BLOOD PRESSURE: 80 MMHG | BODY MASS INDEX: 32.78 KG/M2 | TEMPERATURE: 98.4 F | RESPIRATION RATE: 16 BRPM | WEIGHT: 185 LBS

## 2021-11-08 DIAGNOSIS — R73.03 PREDIABETES.: ICD-10-CM

## 2021-11-08 DIAGNOSIS — M54.10 RADICULOPATHY, SITE UNSPECIFIED: ICD-10-CM

## 2021-11-08 PROCEDURE — 99215 OFFICE O/P EST HI 40 MIN: CPT

## 2021-11-08 RX ORDER — LANSOPRAZOLE 30 MG/1
30 CAPSULE, DELAYED RELEASE ORAL
Refills: 0 | Status: DISCONTINUED | COMMUNITY
End: 2021-11-08

## 2021-11-08 RX ORDER — DULAGLUTIDE 0.75 MG/.5ML
0.75 INJECTION, SOLUTION SUBCUTANEOUS
Refills: 0 | Status: DISCONTINUED | COMMUNITY
End: 2021-11-08

## 2021-11-08 RX ORDER — NEBIVOLOL HYDROCHLORIDE 5 MG/1
5 TABLET ORAL
Qty: 30 | Refills: 0 | Status: DISCONTINUED | COMMUNITY
Start: 2021-09-24 | End: 2021-11-08

## 2021-11-08 RX ORDER — DULAGLUTIDE 3 MG/.5ML
3 INJECTION, SOLUTION SUBCUTANEOUS
Qty: 4 | Refills: 0 | Status: DISCONTINUED | COMMUNITY
Start: 2021-07-20 | End: 2021-11-08

## 2021-11-08 RX ORDER — BUPROPION HYDROCHLORIDE 150 MG/1
150 TABLET, EXTENDED RELEASE ORAL
Refills: 0 | Status: DISCONTINUED | COMMUNITY
End: 2021-11-08

## 2021-11-08 RX ORDER — PREGABALIN 50 MG/1
50 CAPSULE ORAL
Qty: 60 | Refills: 0 | Status: DISCONTINUED | COMMUNITY
Start: 2021-07-20

## 2021-11-08 NOTE — PLAN
[FreeTextEntry1] : Advised to hold all vitamins, NSAIDS including Motrin, Advil, Aleve, ASA, fish oil, all supplements 7 days prior to surgery.  \par \par Close airway monitoring and  oxygen saturation  monitoring is required\par \par Continue medications as directed.\par \par   DVT prophylaxis per surgeon.\par \par Continue healthy diet and weight loss to prevent DM.  A1C5.7.  DC Trulicity and will follow A1C.  \par \par FU 12/15 as planned.  Will monitor LFTs/alk phos post op.  \par
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
## 2021-11-08 NOTE — RESULTS/DATA
[] : results reviewed [de-identified] : WNLs [de-identified] : WNLs [de-identified] : alk phos 155 [de-identified] : NSR 64 bpm, normal axis and intervals.  No acute ST- T wave changes.   [de-identified] : A1C 5.7
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
## 2021-11-11 NOTE — ADDENDUM
[FreeTextEntry1] : Documented by Ethan Dewitt acting as a scribe for Dr. Humphrey Bhagat on 10/14/2021. All medical record entries made by the Scribe were at my, Dr. Humphrey Bhagat, direction and personally dictated by me on 10/14/2021 . I have reviewed the chart and agree that the record accurately reflects my personal performance of the history, physical exam, assessment and plan. I have also personally directed, reviewed, and agreed with the chart.
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
## 2021-11-18 RX ORDER — ACETAMINOPHEN 500 MG
975 TABLET ORAL ONCE
Refills: 0 | Status: COMPLETED | OUTPATIENT
Start: 2021-11-22 | End: 2021-11-22

## 2021-11-19 ENCOUNTER — APPOINTMENT (OUTPATIENT)
Dept: DISASTER EMERGENCY | Facility: CLINIC | Age: 51
End: 2021-11-19

## 2021-11-19 DIAGNOSIS — Z01.818 ENCOUNTER FOR OTHER PREPROCEDURAL EXAMINATION: ICD-10-CM

## 2021-11-20 LAB — SARS-COV-2 N GENE NPH QL NAA+PROBE: NOT DETECTED

## 2021-11-21 ENCOUNTER — TRANSCRIPTION ENCOUNTER (OUTPATIENT)
Age: 51
End: 2021-11-21

## 2021-11-22 ENCOUNTER — APPOINTMENT (OUTPATIENT)
Dept: ORTHOPEDIC SURGERY | Facility: HOSPITAL | Age: 51
End: 2021-11-22

## 2021-11-22 ENCOUNTER — TRANSCRIPTION ENCOUNTER (OUTPATIENT)
Age: 51
End: 2021-11-22

## 2021-11-22 ENCOUNTER — INPATIENT (INPATIENT)
Facility: HOSPITAL | Age: 51
LOS: 1 days | Discharge: HOME | DRG: 455 | End: 2021-11-24
Attending: ORTHOPAEDIC SURGERY | Admitting: ORTHOPAEDIC SURGERY
Payer: COMMERCIAL

## 2021-11-22 VITALS
TEMPERATURE: 97 F | HEIGHT: 63 IN | RESPIRATION RATE: 15 BRPM | DIASTOLIC BLOOD PRESSURE: 60 MMHG | SYSTOLIC BLOOD PRESSURE: 128 MMHG | HEART RATE: 74 BPM | OXYGEN SATURATION: 98 % | WEIGHT: 196.21 LBS

## 2021-11-22 DIAGNOSIS — Z90.711 ACQUIRED ABSENCE OF UTERUS WITH REMAINING CERVICAL STUMP: Chronic | ICD-10-CM

## 2021-11-22 DIAGNOSIS — M51.26 OTHER INTERVERTEBRAL DISC DISPLACEMENT, LUMBAR REGION: ICD-10-CM

## 2021-11-22 DIAGNOSIS — Z98.1 ARTHRODESIS STATUS: Chronic | ICD-10-CM

## 2021-11-22 LAB
BLD GP AB SCN SERPL QL: SIGNIFICANT CHANGE UP
GLUCOSE BLDC GLUCOMTR-MCNC: 88 MG/DL — SIGNIFICANT CHANGE UP (ref 70–99)

## 2021-11-22 PROCEDURE — 22614 ARTHRD PST TQ 1NTRSPC EA ADD: CPT

## 2021-11-22 PROCEDURE — 22614 ARTHRD PST TQ 1NTRSPC EA ADD: CPT | Mod: AS

## 2021-11-22 PROCEDURE — 22633 ARTHRD CMBN 1NTRSPC LUMBAR: CPT

## 2021-11-22 PROCEDURE — 22842 INSERT SPINE FIXATION DEVICE: CPT

## 2021-11-22 PROCEDURE — 22842 INSERT SPINE FIXATION DEVICE: CPT | Mod: AS

## 2021-11-22 PROCEDURE — 22633 ARTHRD CMBN 1NTRSPC LUMBAR: CPT | Mod: AS

## 2021-11-22 PROCEDURE — 22853 INSJ BIOMECHANICAL DEVICE: CPT

## 2021-11-22 PROCEDURE — 63012 REMOVE LAMINA/FACETS LUMBAR: CPT | Mod: 59

## 2021-11-22 PROCEDURE — 63012 REMOVE LAMINA/FACETS LUMBAR: CPT | Mod: AS,59

## 2021-11-22 PROCEDURE — 22853 INSJ BIOMECHANICAL DEVICE: CPT | Mod: AS

## 2021-11-22 RX ORDER — BUPROPION HYDROCHLORIDE 150 MG/1
1 TABLET, EXTENDED RELEASE ORAL
Qty: 0 | Refills: 0 | DISCHARGE

## 2021-11-22 RX ORDER — HYDROMORPHONE HYDROCHLORIDE 2 MG/ML
0.5 INJECTION INTRAMUSCULAR; INTRAVENOUS; SUBCUTANEOUS EVERY 4 HOURS
Refills: 0 | Status: DISCONTINUED | OUTPATIENT
Start: 2021-11-22 | End: 2021-11-24

## 2021-11-22 RX ORDER — CELECOXIB 200 MG/1
200 CAPSULE ORAL DAILY
Refills: 0 | Status: DISCONTINUED | OUTPATIENT
Start: 2021-11-23 | End: 2021-11-24

## 2021-11-22 RX ORDER — METHOCARBAMOL 500 MG/1
750 TABLET, FILM COATED ORAL THREE TIMES A DAY
Refills: 0 | Status: DISCONTINUED | OUTPATIENT
Start: 2021-11-22 | End: 2021-11-24

## 2021-11-22 RX ORDER — RIMEGEPANT SULFATE 75 MG/75MG
1 TABLET, ORALLY DISINTEGRATING ORAL
Qty: 0 | Refills: 0 | DISCHARGE

## 2021-11-22 RX ORDER — MAGNESIUM HYDROXIDE 400 MG/1
30 TABLET, CHEWABLE ORAL EVERY 12 HOURS
Refills: 0 | Status: DISCONTINUED | OUTPATIENT
Start: 2021-11-22 | End: 2021-11-24

## 2021-11-22 RX ORDER — SODIUM CHLORIDE 9 MG/ML
1000 INJECTION, SOLUTION INTRAVENOUS
Refills: 0 | Status: DISCONTINUED | OUTPATIENT
Start: 2021-11-22 | End: 2021-11-24

## 2021-11-22 RX ORDER — SUMATRIPTAN SUCCINATE 4 MG/.5ML
50 INJECTION, SOLUTION SUBCUTANEOUS
Refills: 0 | Status: DISCONTINUED | OUTPATIENT
Start: 2021-11-22 | End: 2021-11-24

## 2021-11-22 RX ORDER — SODIUM CHLORIDE 9 MG/ML
3 INJECTION INTRAMUSCULAR; INTRAVENOUS; SUBCUTANEOUS EVERY 8 HOURS
Refills: 0 | Status: DISCONTINUED | OUTPATIENT
Start: 2021-11-22 | End: 2021-11-22

## 2021-11-22 RX ORDER — DULOXETINE HYDROCHLORIDE 30 MG/1
60 CAPSULE, DELAYED RELEASE ORAL DAILY
Refills: 0 | Status: DISCONTINUED | OUTPATIENT
Start: 2021-11-22 | End: 2021-11-24

## 2021-11-22 RX ORDER — SENNA PLUS 8.6 MG/1
2 TABLET ORAL AT BEDTIME
Refills: 0 | Status: DISCONTINUED | OUTPATIENT
Start: 2021-11-22 | End: 2021-11-24

## 2021-11-22 RX ORDER — HYDROMORPHONE HYDROCHLORIDE 2 MG/ML
1 INJECTION INTRAMUSCULAR; INTRAVENOUS; SUBCUTANEOUS
Refills: 0 | Status: DISCONTINUED | OUTPATIENT
Start: 2021-11-22 | End: 2021-11-22

## 2021-11-22 RX ORDER — VALACYCLOVIR 500 MG/1
1000 TABLET, FILM COATED ORAL DAILY
Refills: 0 | Status: DISCONTINUED | OUTPATIENT
Start: 2021-11-22 | End: 2021-11-24

## 2021-11-22 RX ORDER — SODIUM CHLORIDE 9 MG/ML
1000 INJECTION, SOLUTION INTRAVENOUS
Refills: 0 | Status: DISCONTINUED | OUTPATIENT
Start: 2021-11-22 | End: 2021-11-22

## 2021-11-22 RX ORDER — DULAGLUTIDE 4.5 MG/.5ML
0 INJECTION, SOLUTION SUBCUTANEOUS
Qty: 0 | Refills: 0 | DISCHARGE

## 2021-11-22 RX ORDER — APREPITANT 80 MG/1
40 CAPSULE ORAL ONCE
Refills: 0 | Status: COMPLETED | OUTPATIENT
Start: 2021-11-22 | End: 2021-11-22

## 2021-11-22 RX ORDER — OXYCODONE HYDROCHLORIDE 5 MG/1
5 TABLET ORAL
Refills: 0 | Status: DISCONTINUED | OUTPATIENT
Start: 2021-11-22 | End: 2021-11-24

## 2021-11-22 RX ORDER — OXCARBAZEPINE 300 MG/1
300 TABLET, FILM COATED ORAL
Refills: 0 | Status: DISCONTINUED | OUTPATIENT
Start: 2021-11-22 | End: 2021-11-24

## 2021-11-22 RX ORDER — SUVOREXANT 20 MG/1
1 TABLET, FILM COATED ORAL
Qty: 0 | Refills: 0 | DISCHARGE

## 2021-11-22 RX ORDER — LINACLOTIDE 145 UG/1
145 CAPSULE, GELATIN COATED ORAL
Refills: 0 | Status: DISCONTINUED | OUTPATIENT
Start: 2021-11-22 | End: 2021-11-24

## 2021-11-22 RX ORDER — NEBIVOLOL HYDROCHLORIDE 5 MG/1
5 TABLET ORAL DAILY
Refills: 0 | Status: DISCONTINUED | OUTPATIENT
Start: 2021-11-22 | End: 2021-11-24

## 2021-11-22 RX ORDER — BUPIVACAINE 13.3 MG/ML
20 INJECTION, SUSPENSION, LIPOSOMAL INFILTRATION ONCE
Refills: 0 | Status: DISCONTINUED | OUTPATIENT
Start: 2021-11-22 | End: 2021-11-22

## 2021-11-22 RX ORDER — ATORVASTATIN CALCIUM 80 MG/1
20 TABLET, FILM COATED ORAL AT BEDTIME
Refills: 0 | Status: DISCONTINUED | OUTPATIENT
Start: 2021-11-22 | End: 2021-11-24

## 2021-11-22 RX ORDER — NEBIVOLOL HYDROCHLORIDE 5 MG/1
5 TABLET ORAL DAILY
Refills: 0 | Status: DISCONTINUED | OUTPATIENT
Start: 2021-11-22 | End: 2021-11-22

## 2021-11-22 RX ORDER — ONDANSETRON 8 MG/1
4 TABLET, FILM COATED ORAL ONCE
Refills: 0 | Status: DISCONTINUED | OUTPATIENT
Start: 2021-11-22 | End: 2021-11-22

## 2021-11-22 RX ORDER — ACETAMINOPHEN 500 MG
975 TABLET ORAL EVERY 6 HOURS
Refills: 0 | Status: DISCONTINUED | OUTPATIENT
Start: 2021-11-22 | End: 2021-11-24

## 2021-11-22 RX ORDER — HYDROMORPHONE HYDROCHLORIDE 2 MG/ML
0.5 INJECTION INTRAMUSCULAR; INTRAVENOUS; SUBCUTANEOUS
Refills: 0 | Status: DISCONTINUED | OUTPATIENT
Start: 2021-11-22 | End: 2021-11-22

## 2021-11-22 RX ORDER — OXYCODONE HYDROCHLORIDE 5 MG/1
15 TABLET ORAL
Refills: 0 | Status: DISCONTINUED | OUTPATIENT
Start: 2021-11-22 | End: 2021-11-24

## 2021-11-22 RX ORDER — ACETAMINOPHEN 500 MG
1000 TABLET ORAL ONCE
Refills: 0 | Status: COMPLETED | OUTPATIENT
Start: 2021-11-22 | End: 2021-11-22

## 2021-11-22 RX ORDER — PANTOPRAZOLE SODIUM 20 MG/1
40 TABLET, DELAYED RELEASE ORAL
Refills: 0 | Status: DISCONTINUED | OUTPATIENT
Start: 2021-11-22 | End: 2021-11-24

## 2021-11-22 RX ORDER — ONDANSETRON 8 MG/1
4 TABLET, FILM COATED ORAL EVERY 6 HOURS
Refills: 0 | Status: DISCONTINUED | OUTPATIENT
Start: 2021-11-22 | End: 2021-11-24

## 2021-11-22 RX ORDER — OXYCODONE HYDROCHLORIDE 5 MG/1
10 TABLET ORAL
Refills: 0 | Status: DISCONTINUED | OUTPATIENT
Start: 2021-11-22 | End: 2021-11-24

## 2021-11-22 RX ORDER — DIAZEPAM 5 MG
2 TABLET ORAL THREE TIMES A DAY
Refills: 0 | Status: DISCONTINUED | OUTPATIENT
Start: 2021-11-22 | End: 2021-11-24

## 2021-11-22 RX ORDER — CELECOXIB 200 MG/1
200 CAPSULE ORAL ONCE
Refills: 0 | Status: COMPLETED | OUTPATIENT
Start: 2021-11-22 | End: 2021-11-22

## 2021-11-22 RX ORDER — DULOXETINE HYDROCHLORIDE 30 MG/1
0 CAPSULE, DELAYED RELEASE ORAL
Qty: 0 | Refills: 0 | DISCHARGE

## 2021-11-22 RX ORDER — ACETAMINOPHEN 500 MG
975 TABLET ORAL ONCE
Refills: 0 | Status: COMPLETED | OUTPATIENT
Start: 2021-11-22 | End: 2021-11-22

## 2021-11-22 RX ADMIN — Medication 975 MILLIGRAM(S): at 11:49

## 2021-11-22 RX ADMIN — HYDROMORPHONE HYDROCHLORIDE 0.5 MILLIGRAM(S): 2 INJECTION INTRAMUSCULAR; INTRAVENOUS; SUBCUTANEOUS at 18:08

## 2021-11-22 RX ADMIN — Medication 100 MILLIGRAM(S): at 12:34

## 2021-11-22 RX ADMIN — APREPITANT 40 MILLIGRAM(S): 80 CAPSULE ORAL at 11:44

## 2021-11-22 RX ADMIN — OXYCODONE HYDROCHLORIDE 15 MILLIGRAM(S): 5 TABLET ORAL at 18:36

## 2021-11-22 RX ADMIN — OXCARBAZEPINE 300 MILLIGRAM(S): 300 TABLET, FILM COATED ORAL at 19:09

## 2021-11-22 RX ADMIN — OXYCODONE HYDROCHLORIDE 15 MILLIGRAM(S): 5 TABLET ORAL at 19:06

## 2021-11-22 RX ADMIN — Medication 975 MILLIGRAM(S): at 23:19

## 2021-11-22 RX ADMIN — Medication 150 MILLIGRAM(S): at 18:35

## 2021-11-22 RX ADMIN — CELECOXIB 200 MILLIGRAM(S): 200 CAPSULE ORAL at 11:43

## 2021-11-22 RX ADMIN — ATORVASTATIN CALCIUM 20 MILLIGRAM(S): 80 TABLET, FILM COATED ORAL at 22:42

## 2021-11-22 RX ADMIN — HYDROMORPHONE HYDROCHLORIDE 0.5 MILLIGRAM(S): 2 INJECTION INTRAMUSCULAR; INTRAVENOUS; SUBCUTANEOUS at 17:38

## 2021-11-22 RX ADMIN — METHOCARBAMOL 750 MILLIGRAM(S): 500 TABLET, FILM COATED ORAL at 22:42

## 2021-11-22 RX ADMIN — Medication 100 MILLIGRAM(S): at 20:00

## 2021-11-22 RX ADMIN — HYDROMORPHONE HYDROCHLORIDE 0.5 MILLIGRAM(S): 2 INJECTION INTRAMUSCULAR; INTRAVENOUS; SUBCUTANEOUS at 18:35

## 2021-11-22 RX ADMIN — HYDROMORPHONE HYDROCHLORIDE 0.5 MILLIGRAM(S): 2 INJECTION INTRAMUSCULAR; INTRAVENOUS; SUBCUTANEOUS at 19:05

## 2021-11-22 RX ADMIN — SODIUM CHLORIDE 80 MILLILITER(S): 9 INJECTION, SOLUTION INTRAVENOUS at 22:43

## 2021-11-22 RX ADMIN — HYDROMORPHONE HYDROCHLORIDE 0.5 MILLIGRAM(S): 2 INJECTION INTRAMUSCULAR; INTRAVENOUS; SUBCUTANEOUS at 18:34

## 2021-11-22 RX ADMIN — Medication 1000 MILLIGRAM(S): at 18:34

## 2021-11-22 RX ADMIN — Medication 400 MILLIGRAM(S): at 18:04

## 2021-11-22 RX ADMIN — HYDROMORPHONE HYDROCHLORIDE 0.5 MILLIGRAM(S): 2 INJECTION INTRAMUSCULAR; INTRAVENOUS; SUBCUTANEOUS at 18:15

## 2021-11-22 RX ADMIN — SENNA PLUS 2 TABLET(S): 8.6 TABLET ORAL at 22:43

## 2021-11-22 NOTE — DISCHARGE NOTE PROVIDER - CARE PROVIDER_API CALL
Humphrey Bhagat (DO)  Orthopaedic Surgery  [address] [place] [address], [address]  [place] [address]  Phone: [phone]  Fax: [phone]  Follow Up Time:

## 2021-11-22 NOTE — DISCHARGE NOTE PROVIDER - NSDCFUSCHEDAPPT_GEN_ALL_CORE_FT
JAMES REIS ; 11/30/2021 ; [facility] [address]  JAMES REIS ; 12/01/2021 ; [facility] [address] JAMES Vera ; 12/15/2021 ; [facility] [address] JAMES REIS ; 11/30/2021 ; [facility] [address]  JAMES REIS ; 12/01/2021 ; [facility] [address] JAMES Vera ; 12/22/2021 ; [facility] [address]

## 2021-11-22 NOTE — BRIEF OPERATIVE NOTE - NSICDXBRIEFPREOP_GEN_ALL_CORE_FT
PRE-OP DIAGNOSIS:  Lumbar spondylosis 22-Nov-2021 11:00:11  Humphrey Bhagat  
PRE-OP DIAGNOSIS:  Lumbar spondylosis 22-Nov-2021 11:00:11  Humphrey Bhagat

## 2021-11-22 NOTE — DISCHARGE NOTE PROVIDER - NSDCFUADDINST_GEN_ALL_CORE_FT
Follow-up with Dr. Bhaagt within 7-10 days. Dressing change daily until dry, then leave dressing in place until office follow-up. Pain medications as indicated and titrated to patient's needs. Patient will begin aspirin 325mg once daily for 4 weeks post-operatively for clot prevention. Ambulate with assistance of walker. Contact office if the wound becomes red, opens or discharge increases. Pain control. Please call the office to confirm follow-up appointment.  Follow-up with Dr. Bhagat within 7-10 days. Dressing change daily until dry, then leave dressing in place until office follow-up. Pain medications as indicated and titrated to patient's needs. Patient will begin aspirin 325mg once daily for 4 weeks post-operatively for clot prevention. Ambulate with assistance of walker. Contact office if the wound becomes red, opens or discharge increases. Pain control. Please call the office to confirm follow-up appointment.     Follow-up with Primary Doctor for repeat of Hemoglobin 1-2 weeks

## 2021-11-22 NOTE — DISCHARGE NOTE PROVIDER - NSDCMRMEDTOKEN_GEN_ALL_CORE_FT
Aimovig 70 mg/mL subcutaneous solution:   atorvastatin 20 mg oral tablet: 1 tab(s) orally once a day  Bystolic 5 mg oral tablet: 1 tab(s) orally once a day  Cymbalta 60 mg oral delayed release capsule: milligram(s) orally 2 times a day  Linzess 145 mcg oral capsule: 1 cap(s) orally once a day  nebivolol 5 mg oral tablet: 1 tab(s) orally once a day  OXcarbazepine 300 mg oral tablet: 1 tab(s) orally 2 times a day  pantoprazole 40 mg oral delayed release tablet: 1 tab(s) orally once a day  pregabalin 150 mg oral capsule: 1 cap(s) orally 2 times a day  Valtrex 1 g oral tablet: orally once a day   acetaminophen 325 mg oral tablet: 3 tab(s) orally every 6 hours  Aspirin Enteric Coated 325 mg oral delayed release tablet: 1 tab(s) orally once a day   atorvastatin 20 mg oral tablet: 1 tab(s) orally once a day  Bystolic 5 mg oral tablet: 1 tab(s) orally once a day  Cymbalta 60 mg oral delayed release capsule: milligram(s) orally 2 times a day  Linzess 145 mcg oral capsule: 1 cap(s) orally once a day  methocarbamol 750 mg oral tablet: 1 tab(s) orally 3 times a day, As Needed -for muscle spasm   nebivolol 5 mg oral tablet: 1 tab(s) orally once a day  OXcarbazepine 300 mg oral tablet: 1 tab(s) orally 2 times a day  oxyCODONE 5 mg oral tablet: 1-2 tab(s) orally every 4 to 6 hours, As Needed -Mild to Moderate Pain (1 - 3) MDD:6   pantoprazole 40 mg oral delayed release tablet: 1 tab(s) orally once a day  pregabalin 150 mg oral capsule: 1 cap(s) orally 2 times a day  Senna S 50 mg-8.6 mg oral tablet: 2 tab(s) orally once a day (at bedtime)   Valtrex 1 g oral tablet: orally once a day

## 2021-11-22 NOTE — BRIEF OPERATIVE NOTE - NSICDXBRIEFPROCEDURE_GEN_ALL_CORE_FT
PROCEDURES:  Lumbar fusion 22-Nov-2021 10:59:40  Humphrey Bhagat  
PROCEDURES:  Lumbar fusion 22-Nov-2021 10:59:40  Humphrey Bhaagt
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
## 2021-11-22 NOTE — DISCHARGE NOTE PROVIDER - HOSPITAL COURSE
Patient was admitted for elective posterior lumbar Revision fusion on 11/22/21 for failed conservative treatment of persistent lower back pain. The hospital post operative course was uneventful.  The surgical dressing was changed and the drain was removed uneventfully.   PT/OT worked with patient for gait training. Pain was now adequately controlled and patient was discharged home in stable condition.  Chronic medical conditions were treated during the hospital stay.

## 2021-11-22 NOTE — BRIEF OPERATIVE NOTE - NSICDXBRIEFPOSTOP_GEN_ALL_CORE_FT
POST-OP DIAGNOSIS:  Lumbar spondylosis 22-Nov-2021 11:00:30  Humphrey Bhagat  
POST-OP DIAGNOSIS:  Lumbar spondylosis 22-Nov-2021 11:00:30  Humphrey Bhagat

## 2021-11-22 NOTE — BRIEF OPERATIVE NOTE - OPERATION/FINDINGS
spondylosis
I personally assisted all aspects of positioning prone for a posterior lumbar approach on a modular Dieter table. Lumbar spine was cleansed with Betadine by me then cleansed with DuraPrep by RN.  I marked operative vertebrae with the use of C-arm.  I participated in a positioning of blue drapes and ioban and participated in operative timeout.  A longitudinal incision was placed over the L3-L4 spinous processes, 20 mL of 1% lidocaine local anesthetic was placed. I assisted in dissection to the spinous processes then bilateral lamina at L4 bilaterally at which time fluoroscopic imaging confirmed the L4 spinous process.  I then assisted with dissection on the patient's right.   During screw placement I performed the millivolt potential analysis for each screw, ensuring acceptable values. After screw insertion, I assisted annulotomy and subsequent placement of biomechanical grafts in the disc space at L4-L5.  I assisted with placement of posterolateral graft. Rods were then placed with my assistance and I confirmed that pedicle screws were appropriately tightened and torqued.  I provided visualization and assisted with hemostasis throughout the procedure by using sequential retraction, continuous suction, utilization of Surgi-Mitch and dry gel foam, packing with Ray-Bonita, and Bovie cauterization.  I made drain incision cranial to surgical incision, and placed HV drain. The surgical area was irrigated copiously.   I assisted in Deep fascial closure was conducted with 0 Vicryl.  I personally performed closure of superficial fascia with 2-0 Vicryl, Monocryl, Dermabond, longitudinal Steri-Strips, Mepilex then Island Dressing.  Xeroform,  2 x 2, Tegaderm was placed over the drain site. Placed Marcaine/Exparel 40 cc local anesthetic.

## 2021-11-23 LAB
ANION GAP SERPL CALC-SCNC: 7 MMOL/L — SIGNIFICANT CHANGE UP (ref 5–17)
BASOPHILS # BLD AUTO: 0.02 K/UL — SIGNIFICANT CHANGE UP (ref 0–0.2)
BASOPHILS NFR BLD AUTO: 0.2 % — SIGNIFICANT CHANGE UP (ref 0–2)
BUN SERPL-MCNC: 11.2 MG/DL — SIGNIFICANT CHANGE UP (ref 8–20)
CALCIUM SERPL-MCNC: 7.7 MG/DL — LOW (ref 8.6–10.2)
CHLORIDE SERPL-SCNC: 105 MMOL/L — SIGNIFICANT CHANGE UP (ref 98–107)
CO2 SERPL-SCNC: 27 MMOL/L — SIGNIFICANT CHANGE UP (ref 22–29)
COVID-19 NUCLEOCAPSID GAM AB INTERP: POSITIVE
COVID-19 NUCLEOCAPSID TOTAL GAM ANTIBODY RESULT: 58.1 INDEX — HIGH
COVID-19 SPIKE DOMAIN AB INTERP: POSITIVE
COVID-19 SPIKE DOMAIN ANTIBODY RESULT: >250 U/ML — HIGH
CREAT SERPL-MCNC: 0.42 MG/DL — LOW (ref 0.5–1.3)
EOSINOPHIL # BLD AUTO: 0.16 K/UL — SIGNIFICANT CHANGE UP (ref 0–0.5)
EOSINOPHIL NFR BLD AUTO: 1.9 % — SIGNIFICANT CHANGE UP (ref 0–6)
GLUCOSE SERPL-MCNC: 112 MG/DL — HIGH (ref 70–99)
HCT VFR BLD CALC: 28.4 % — LOW (ref 34.5–45)
HGB BLD-MCNC: 9.5 G/DL — LOW (ref 11.5–15.5)
IMM GRANULOCYTES NFR BLD AUTO: 0.4 % — SIGNIFICANT CHANGE UP (ref 0–1.5)
LYMPHOCYTES # BLD AUTO: 1.83 K/UL — SIGNIFICANT CHANGE UP (ref 1–3.3)
LYMPHOCYTES # BLD AUTO: 21.5 % — SIGNIFICANT CHANGE UP (ref 13–44)
MCHC RBC-ENTMCNC: 31.8 PG — SIGNIFICANT CHANGE UP (ref 27–34)
MCHC RBC-ENTMCNC: 33.5 GM/DL — SIGNIFICANT CHANGE UP (ref 32–36)
MCV RBC AUTO: 95 FL — SIGNIFICANT CHANGE UP (ref 80–100)
MONOCYTES # BLD AUTO: 0.75 K/UL — SIGNIFICANT CHANGE UP (ref 0–0.9)
MONOCYTES NFR BLD AUTO: 8.8 % — SIGNIFICANT CHANGE UP (ref 2–14)
NEUTROPHILS # BLD AUTO: 5.73 K/UL — SIGNIFICANT CHANGE UP (ref 1.8–7.4)
NEUTROPHILS NFR BLD AUTO: 67.2 % — SIGNIFICANT CHANGE UP (ref 43–77)
PLATELET # BLD AUTO: 210 K/UL — SIGNIFICANT CHANGE UP (ref 150–400)
POTASSIUM SERPL-MCNC: 3.7 MMOL/L — SIGNIFICANT CHANGE UP (ref 3.5–5.3)
POTASSIUM SERPL-SCNC: 3.7 MMOL/L — SIGNIFICANT CHANGE UP (ref 3.5–5.3)
RBC # BLD: 2.99 M/UL — LOW (ref 3.8–5.2)
RBC # FLD: 12.2 % — SIGNIFICANT CHANGE UP (ref 10.3–14.5)
SARS-COV-2 IGG+IGM SERPL QL IA: 58.1 INDEX — HIGH
SARS-COV-2 IGG+IGM SERPL QL IA: >250 U/ML — HIGH
SARS-COV-2 IGG+IGM SERPL QL IA: POSITIVE
SARS-COV-2 IGG+IGM SERPL QL IA: POSITIVE
SODIUM SERPL-SCNC: 139 MMOL/L — SIGNIFICANT CHANGE UP (ref 135–145)
WBC # BLD: 8.52 K/UL — SIGNIFICANT CHANGE UP (ref 3.8–10.5)
WBC # FLD AUTO: 8.52 K/UL — SIGNIFICANT CHANGE UP (ref 3.8–10.5)

## 2021-11-23 RX ORDER — LANOLIN ALCOHOL/MO/W.PET/CERES
3 CREAM (GRAM) TOPICAL ONCE
Refills: 0 | Status: COMPLETED | OUTPATIENT
Start: 2021-11-23 | End: 2021-11-23

## 2021-11-23 RX ORDER — SODIUM CHLORIDE 9 MG/ML
1000 INJECTION INTRAMUSCULAR; INTRAVENOUS; SUBCUTANEOUS ONCE
Refills: 0 | Status: COMPLETED | OUTPATIENT
Start: 2021-11-23 | End: 2021-11-23

## 2021-11-23 RX ORDER — DIPHENHYDRAMINE HCL 50 MG
25 CAPSULE ORAL ONCE
Refills: 0 | Status: COMPLETED | OUTPATIENT
Start: 2021-11-23 | End: 2021-11-23

## 2021-11-23 RX ORDER — ACETAMINOPHEN 500 MG
1000 TABLET ORAL ONCE
Refills: 0 | Status: COMPLETED | OUTPATIENT
Start: 2021-11-23 | End: 2021-11-23

## 2021-11-23 RX ORDER — SODIUM CHLORIDE 9 MG/ML
500 INJECTION INTRAMUSCULAR; INTRAVENOUS; SUBCUTANEOUS ONCE
Refills: 0 | Status: COMPLETED | OUTPATIENT
Start: 2021-11-23 | End: 2021-11-23

## 2021-11-23 RX ADMIN — Medication 150 MILLIGRAM(S): at 17:12

## 2021-11-23 RX ADMIN — ATORVASTATIN CALCIUM 20 MILLIGRAM(S): 80 TABLET, FILM COATED ORAL at 21:46

## 2021-11-23 RX ADMIN — DULOXETINE HYDROCHLORIDE 60 MILLIGRAM(S): 30 CAPSULE, DELAYED RELEASE ORAL at 15:14

## 2021-11-23 RX ADMIN — OXYCODONE HYDROCHLORIDE 15 MILLIGRAM(S): 5 TABLET ORAL at 09:50

## 2021-11-23 RX ADMIN — OXYCODONE HYDROCHLORIDE 15 MILLIGRAM(S): 5 TABLET ORAL at 10:50

## 2021-11-23 RX ADMIN — METHOCARBAMOL 750 MILLIGRAM(S): 500 TABLET, FILM COATED ORAL at 21:46

## 2021-11-23 RX ADMIN — OXYCODONE HYDROCHLORIDE 5 MILLIGRAM(S): 5 TABLET ORAL at 21:48

## 2021-11-23 RX ADMIN — Medication 975 MILLIGRAM(S): at 00:15

## 2021-11-23 RX ADMIN — Medication 975 MILLIGRAM(S): at 21:53

## 2021-11-23 RX ADMIN — VALACYCLOVIR 1000 MILLIGRAM(S): 500 TABLET, FILM COATED ORAL at 15:28

## 2021-11-23 RX ADMIN — OXCARBAZEPINE 300 MILLIGRAM(S): 300 TABLET, FILM COATED ORAL at 17:13

## 2021-11-23 RX ADMIN — LINACLOTIDE 145 MICROGRAM(S): 145 CAPSULE, GELATIN COATED ORAL at 06:21

## 2021-11-23 RX ADMIN — Medication 1000 MILLIGRAM(S): at 05:40

## 2021-11-23 RX ADMIN — PANTOPRAZOLE SODIUM 40 MILLIGRAM(S): 20 TABLET, DELAYED RELEASE ORAL at 05:11

## 2021-11-23 RX ADMIN — Medication 3 MILLIGRAM(S): at 02:12

## 2021-11-23 RX ADMIN — METHOCARBAMOL 750 MILLIGRAM(S): 500 TABLET, FILM COATED ORAL at 05:11

## 2021-11-23 RX ADMIN — OXCARBAZEPINE 300 MILLIGRAM(S): 300 TABLET, FILM COATED ORAL at 05:11

## 2021-11-23 RX ADMIN — Medication 25 MILLIGRAM(S): at 15:45

## 2021-11-23 RX ADMIN — OXYCODONE HYDROCHLORIDE 5 MILLIGRAM(S): 5 TABLET ORAL at 22:48

## 2021-11-23 RX ADMIN — Medication 975 MILLIGRAM(S): at 22:53

## 2021-11-23 RX ADMIN — CELECOXIB 200 MILLIGRAM(S): 200 CAPSULE ORAL at 15:15

## 2021-11-23 RX ADMIN — Medication 975 MILLIGRAM(S): at 15:15

## 2021-11-23 RX ADMIN — SODIUM CHLORIDE 1000 MILLILITER(S): 9 INJECTION INTRAMUSCULAR; INTRAVENOUS; SUBCUTANEOUS at 02:12

## 2021-11-23 RX ADMIN — Medication 100 MILLIGRAM(S): at 03:52

## 2021-11-23 RX ADMIN — METHOCARBAMOL 750 MILLIGRAM(S): 500 TABLET, FILM COATED ORAL at 15:14

## 2021-11-23 RX ADMIN — Medication 400 MILLIGRAM(S): at 05:10

## 2021-11-23 RX ADMIN — SODIUM CHLORIDE 1000 MILLILITER(S): 9 INJECTION INTRAMUSCULAR; INTRAVENOUS; SUBCUTANEOUS at 00:45

## 2021-11-23 RX ADMIN — Medication 150 MILLIGRAM(S): at 05:15

## 2021-11-23 NOTE — PHYSICAL THERAPY INITIAL EVALUATION ADULT - ADDITIONAL COMMENTS
Pt lives in a house with 1 MAR[address] and no steps inside. no DME. Pt states she has friends that will be assisting her upon d/c home.

## 2021-11-23 NOTE — OCCUPATIONAL THERAPY INITIAL EVALUATION ADULT - ADDITIONAL COMMENTS
Pt lives in apartment with 1 step to enter, no steps inside.  She drives.  She has no medical equipment other than grab bar in shower.

## 2021-11-23 NOTE — PROVIDER CONTACT NOTE (OTHER) - ACTION/TREATMENT ORDERED:
Encourage PO fluids, c/w IVF, give IV Tylenol at o500, will follow up in am, hold Perez catheter for now, do not remove this am

## 2021-11-23 NOTE — OCCUPATIONAL THERAPY INITIAL EVALUATION ADULT - NS ASR OT EQUIP NEEDS DISCH
Pt needs RW and commode.  Pt plans to obtain shower chair and hip kit on her own if needed pending progress.

## 2021-11-23 NOTE — OCCUPATIONAL THERAPY INITIAL EVALUATION ADULT - LIVES WITH, PROFILE
but her significant other plans to stay and assist pt upon discharge.  He recently had a shoulder replacement but pt has other family members that can assist as well./alone

## 2021-11-23 NOTE — OCCUPATIONAL THERAPY INITIAL EVALUATION ADULT - GENERAL OBSERVATIONS, REHAB EVAL
Pt received supine in bed, +bilateral VCDs, +IV, +cardiac monitor with , +drain, agreeable to OT eval.

## 2021-11-23 NOTE — PHYSICAL THERAPY INITIAL EVALUATION ADULT - PERTINENT HX OF CURRENT PROBLEM, REHAB EVAL
Pt is a 50 y/o female who presented from home for above mentioned procedure. hypotension noted following procedure.

## 2021-11-24 ENCOUNTER — TRANSCRIPTION ENCOUNTER (OUTPATIENT)
Age: 51
End: 2021-11-24

## 2021-11-24 VITALS
SYSTOLIC BLOOD PRESSURE: 97 MMHG | OXYGEN SATURATION: 99 % | DIASTOLIC BLOOD PRESSURE: 62 MMHG | TEMPERATURE: 98 F | HEART RATE: 85 BPM | RESPIRATION RATE: 17 BRPM

## 2021-11-24 LAB
ANION GAP SERPL CALC-SCNC: 11 MMOL/L — SIGNIFICANT CHANGE UP (ref 5–17)
BASOPHILS # BLD AUTO: 0.02 K/UL — SIGNIFICANT CHANGE UP (ref 0–0.2)
BASOPHILS NFR BLD AUTO: 0.3 % — SIGNIFICANT CHANGE UP (ref 0–2)
BUN SERPL-MCNC: 8.5 MG/DL — SIGNIFICANT CHANGE UP (ref 8–20)
CALCIUM SERPL-MCNC: 8.2 MG/DL — LOW (ref 8.6–10.2)
CHLORIDE SERPL-SCNC: 105 MMOL/L — SIGNIFICANT CHANGE UP (ref 98–107)
CO2 SERPL-SCNC: 26 MMOL/L — SIGNIFICANT CHANGE UP (ref 22–29)
CREAT SERPL-MCNC: 0.44 MG/DL — LOW (ref 0.5–1.3)
EOSINOPHIL # BLD AUTO: 0.26 K/UL — SIGNIFICANT CHANGE UP (ref 0–0.5)
EOSINOPHIL NFR BLD AUTO: 3.5 % — SIGNIFICANT CHANGE UP (ref 0–6)
GLUCOSE SERPL-MCNC: 112 MG/DL — HIGH (ref 70–99)
HCT VFR BLD CALC: 26.2 % — LOW (ref 34.5–45)
HGB BLD-MCNC: 8.8 G/DL — LOW (ref 11.5–15.5)
IMM GRANULOCYTES NFR BLD AUTO: 0.1 % — SIGNIFICANT CHANGE UP (ref 0–1.5)
LYMPHOCYTES # BLD AUTO: 2.04 K/UL — SIGNIFICANT CHANGE UP (ref 1–3.3)
LYMPHOCYTES # BLD AUTO: 27.4 % — SIGNIFICANT CHANGE UP (ref 13–44)
MCHC RBC-ENTMCNC: 32 PG — SIGNIFICANT CHANGE UP (ref 27–34)
MCHC RBC-ENTMCNC: 33.6 GM/DL — SIGNIFICANT CHANGE UP (ref 32–36)
MCV RBC AUTO: 95.3 FL — SIGNIFICANT CHANGE UP (ref 80–100)
MONOCYTES # BLD AUTO: 0.75 K/UL — SIGNIFICANT CHANGE UP (ref 0–0.9)
MONOCYTES NFR BLD AUTO: 10.1 % — SIGNIFICANT CHANGE UP (ref 2–14)
NEUTROPHILS # BLD AUTO: 4.36 K/UL — SIGNIFICANT CHANGE UP (ref 1.8–7.4)
NEUTROPHILS NFR BLD AUTO: 58.6 % — SIGNIFICANT CHANGE UP (ref 43–77)
PLATELET # BLD AUTO: 179 K/UL — SIGNIFICANT CHANGE UP (ref 150–400)
POTASSIUM SERPL-MCNC: 3.7 MMOL/L — SIGNIFICANT CHANGE UP (ref 3.5–5.3)
POTASSIUM SERPL-SCNC: 3.7 MMOL/L — SIGNIFICANT CHANGE UP (ref 3.5–5.3)
RBC # BLD: 2.75 M/UL — LOW (ref 3.8–5.2)
RBC # FLD: 12.2 % — SIGNIFICANT CHANGE UP (ref 10.3–14.5)
SODIUM SERPL-SCNC: 142 MMOL/L — SIGNIFICANT CHANGE UP (ref 135–145)
WBC # BLD: 7.44 K/UL — SIGNIFICANT CHANGE UP (ref 3.8–10.5)
WBC # FLD AUTO: 7.44 K/UL — SIGNIFICANT CHANGE UP (ref 3.8–10.5)

## 2021-11-24 PROCEDURE — 76000 FLUOROSCOPY <1 HR PHYS/QHP: CPT

## 2021-11-24 PROCEDURE — 36415 COLL VENOUS BLD VENIPUNCTURE: CPT

## 2021-11-24 PROCEDURE — 86900 BLOOD TYPING SEROLOGIC ABO: CPT

## 2021-11-24 PROCEDURE — 86850 RBC ANTIBODY SCREEN: CPT

## 2021-11-24 PROCEDURE — 86901 BLOOD TYPING SEROLOGIC RH(D): CPT

## 2021-11-24 PROCEDURE — 80048 BASIC METABOLIC PNL TOTAL CA: CPT

## 2021-11-24 PROCEDURE — 82962 GLUCOSE BLOOD TEST: CPT

## 2021-11-24 PROCEDURE — C1713: CPT

## 2021-11-24 PROCEDURE — C1889: CPT

## 2021-11-24 PROCEDURE — 85025 COMPLETE CBC W/AUTO DIFF WBC: CPT

## 2021-11-24 PROCEDURE — 97167 OT EVAL HIGH COMPLEX 60 MIN: CPT

## 2021-11-24 PROCEDURE — 86769 SARS-COV-2 COVID-19 ANTIBODY: CPT

## 2021-11-24 RX ORDER — SENNOSIDES/DOCUSATE SODIUM 8.6MG-50MG
2 TABLET ORAL
Qty: 28 | Refills: 0
Start: 2021-11-24 | End: 2021-12-07

## 2021-11-24 RX ORDER — ERENUMAB-AOOE 70 MG/ML
0 INJECTION SUBCUTANEOUS
Qty: 0 | Refills: 0 | DISCHARGE

## 2021-11-24 RX ORDER — ACETAMINOPHEN 500 MG
3 TABLET ORAL
Qty: 0 | Refills: 0 | DISCHARGE
Start: 2021-11-24

## 2021-11-24 RX ORDER — ASPIRIN/CALCIUM CARB/MAGNESIUM 324 MG
1 TABLET ORAL
Qty: 28 | Refills: 0
Start: 2021-11-24 | End: 2021-12-21

## 2021-11-24 RX ORDER — METHOCARBAMOL 500 MG/1
1 TABLET, FILM COATED ORAL
Qty: 21 | Refills: 0
Start: 2021-11-24 | End: 2021-11-30

## 2021-11-24 RX ORDER — OXYCODONE HYDROCHLORIDE 5 MG/1
1 TABLET ORAL
Qty: 42 | Refills: 0
Start: 2021-11-24 | End: 2021-11-30

## 2021-11-24 RX ADMIN — OXYCODONE HYDROCHLORIDE 15 MILLIGRAM(S): 5 TABLET ORAL at 11:52

## 2021-11-24 RX ADMIN — Medication 975 MILLIGRAM(S): at 06:14

## 2021-11-24 RX ADMIN — Medication 975 MILLIGRAM(S): at 05:14

## 2021-11-24 RX ADMIN — CELECOXIB 200 MILLIGRAM(S): 200 CAPSULE ORAL at 12:45

## 2021-11-24 RX ADMIN — DULOXETINE HYDROCHLORIDE 60 MILLIGRAM(S): 30 CAPSULE, DELAYED RELEASE ORAL at 11:52

## 2021-11-24 RX ADMIN — METHOCARBAMOL 750 MILLIGRAM(S): 500 TABLET, FILM COATED ORAL at 05:14

## 2021-11-24 RX ADMIN — Medication 975 MILLIGRAM(S): at 12:45

## 2021-11-24 RX ADMIN — METHOCARBAMOL 750 MILLIGRAM(S): 500 TABLET, FILM COATED ORAL at 14:24

## 2021-11-24 RX ADMIN — Medication 150 MILLIGRAM(S): at 05:13

## 2021-11-24 RX ADMIN — PANTOPRAZOLE SODIUM 40 MILLIGRAM(S): 20 TABLET, DELAYED RELEASE ORAL at 05:14

## 2021-11-24 RX ADMIN — Medication 975 MILLIGRAM(S): at 18:35

## 2021-11-24 RX ADMIN — Medication 975 MILLIGRAM(S): at 17:53

## 2021-11-24 RX ADMIN — Medication 150 MILLIGRAM(S): at 17:53

## 2021-11-24 RX ADMIN — CELECOXIB 200 MILLIGRAM(S): 200 CAPSULE ORAL at 11:55

## 2021-11-24 RX ADMIN — OXCARBAZEPINE 300 MILLIGRAM(S): 300 TABLET, FILM COATED ORAL at 05:14

## 2021-11-24 RX ADMIN — OXCARBAZEPINE 300 MILLIGRAM(S): 300 TABLET, FILM COATED ORAL at 17:53

## 2021-11-24 RX ADMIN — VALACYCLOVIR 1000 MILLIGRAM(S): 500 TABLET, FILM COATED ORAL at 11:53

## 2021-11-24 RX ADMIN — Medication 975 MILLIGRAM(S): at 11:53

## 2021-11-24 RX ADMIN — OXYCODONE HYDROCHLORIDE 15 MILLIGRAM(S): 5 TABLET ORAL at 14:24
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
## 2021-11-24 NOTE — DISCHARGE NOTE NURSING/CASE MANAGEMENT/SOCIAL WORK - PATIENT PORTAL LINK FT
You can access the FollowMyHealth Patient Portal offered by [facility] by registering at the following website: [email or web address]. By joining [facility]’s FollowMyHealth portal, you will also be able to view your health information using other applications (apps) compatible with our system.

## 2021-11-30 ENCOUNTER — APPOINTMENT (OUTPATIENT)
Dept: ORTHOPEDIC SURGERY | Facility: CLINIC | Age: 51
End: 2021-11-30
Payer: MEDICARE

## 2021-11-30 ENCOUNTER — NON-APPOINTMENT (OUTPATIENT)
Age: 51
End: 2021-11-30

## 2021-11-30 PROBLEM — K21.9 GASTRO-ESOPHAGEAL REFLUX DISEASE WITHOUT ESOPHAGITIS: Chronic | Status: ACTIVE | Noted: 2021-11-01

## 2021-11-30 PROBLEM — E78.5 HYPERLIPIDEMIA, UNSPECIFIED: Chronic | Status: ACTIVE | Noted: 2021-11-01

## 2021-11-30 PROBLEM — M54.16 RADICULOPATHY, LUMBAR REGION: Chronic | Status: ACTIVE | Noted: 2021-11-01

## 2021-11-30 PROBLEM — M51.26 OTHER INTERVERTEBRAL DISC DISPLACEMENT, LUMBAR REGION: Chronic | Status: ACTIVE | Noted: 2021-11-01

## 2021-11-30 PROBLEM — F43.10 POST-TRAUMATIC STRESS DISORDER, UNSPECIFIED: Chronic | Status: ACTIVE | Noted: 2021-11-01

## 2021-11-30 PROBLEM — I10 ESSENTIAL (PRIMARY) HYPERTENSION: Chronic | Status: ACTIVE | Noted: 2021-11-01

## 2021-11-30 PROCEDURE — 99024 POSTOP FOLLOW-UP VISIT: CPT

## 2021-11-30 PROCEDURE — 72100 X-RAY EXAM L-S SPINE 2/3 VWS: CPT

## 2021-11-30 NOTE — ADDENDUM
[FreeTextEntry1] : Documented by Ethan Dewitt acting as a scribe for Dee Horton on 11/30/2021 . All medical record entries made by the Scribe were at my, Dee Horton , direction and personally dictated by me on 11/30/2021  . I have reviewed the chart and agree that the record accurately reflects my personal performance of the history, physical exam, assessment and plan. I have also personally directed, reviewed, and agreed with the chart.

## 2021-11-30 NOTE — HISTORY OF PRESENT ILLNESS
[Clean/Dry/Intact] : clean, dry and intact [Healed] : healed [Neuro Intact] : an unremarkable neurological exam [Vascular Intact] : ~T peripheral vascular exam normal [Doing Well] : is doing well [Excellent Pain Control] : has excellent pain control [No Sign of Infection] : is showing no signs of infection [Chills] : no chills [Fever] : no fever [Erythema] : not erythematous [Discharge] : absent of discharge [Swelling] : not swollen [Dehiscence] : not dehisced [de-identified] : S/p revision L5-S1 fusion, L4-L5 fusion with interbody.  [de-identified] : 51 year old F S/p L4-L5 and L5-S1 fusion. Patient presents with a family member. She feels her pain has improved although she is still adjusting to the post surgical pain.  [de-identified] : constitutional- No acute distress\par neurologic- no LE radiculitis.\par skin- incision dry clean and intact. The incision was well healed. Incision was cleansed with Chlora-prep and a new dressing was applied. Steri Strips in place. \par musculoskeletal - motor strength is 5/5.  [de-identified] : Xray of the lumbar spine taken 11/30/2021 demonstrates 2 level lumbar fusion, surgical implants appear well positioned. [de-identified] : S/p revision fusion.  [de-identified] : Incisional care was discussed and patient was educated regarding appropriate incisional care for this point in the post operative period. Patient was provided extra dressings. Continuation of home therapy was discussed at length with the patient and recommended at this time. I advised light aerobic conditioning and light weight bearing exercises. Continue with ADLs as tolerated at this time. We discussed appropriate showering for the post operative period.  The patient will follow up in three weeks for repeat clinical assessment.

## 2021-12-07 ENCOUNTER — APPOINTMENT (OUTPATIENT)
Dept: ORTHOPEDIC SURGERY | Facility: CLINIC | Age: 51
End: 2021-12-07
Payer: COMMERCIAL

## 2021-12-07 PROCEDURE — 99024 POSTOP FOLLOW-UP VISIT: CPT

## 2021-12-07 NOTE — HISTORY OF PRESENT ILLNESS
[Clean/Dry/Intact] : clean, dry and intact [Healed] : healed [Neuro Intact] : an unremarkable neurological exam [Vascular Intact] : ~T peripheral vascular exam normal [Doing Well] : is doing well [Excellent Pain Control] : has excellent pain control [No Sign of Infection] : is showing no signs of infection [Steri-Strips Removed & Replaced] : steri-strips removed and replaced [Chills] : no chills [Fever] : no fever [Erythema] : not erythematous [Discharge] : absent of discharge [Swelling] : not swollen [Dehiscence] : not dehisced [de-identified] : S/p revision L5-S1 fusion, L4-L5 fusion with interbody. DOS 11- [de-identified] : 51 year old F S/p L4-L5 and L5-S1 fusion. Patient presents with a family member. She feels her pre operative signs and symptoms have improved. She presents for a wound check. She is not taking pain medication but she was taking antiinflammatories.  [de-identified] : constitutional- No acute distress, non toxic\par neurologic- Right S1 radiculitis that is mild and intermittent. \par skin- incision dry clean and intact. The incision is healing well. Cleansed with Chloraprep and steri strips placed. \par musculoskeletal - motor strength is 5/5. Expected myositis and mechanically oriented lower back pain [de-identified] : Xray of the lumbar spine taken 11/30/2021 demonstrates 2 level lumbar fusion, surgical implants appear well positioned. [de-identified] : S/p revision fusion.  [de-identified] : Cease antiinflammatories. Oxycodone 5MG QD for moderate to severe pain. Use of Tylenol 500 MG 2-3 times daily / PRN was advised. Patient was prescribed Methocarbamol 750Mg QD / PRN spasm. Continue use of cane until she feels stable.  The patient will follow up in 4 - 6 weeks for a repeat clinical assessment or Patient to follow up on a PRN basis if she feels well improved.

## 2021-12-21 ENCOUNTER — APPOINTMENT (OUTPATIENT)
Dept: ORTHOPEDIC SURGERY | Facility: CLINIC | Age: 51
End: 2021-12-21
Payer: MEDICARE

## 2021-12-21 PROCEDURE — 99024 POSTOP FOLLOW-UP VISIT: CPT

## 2021-12-21 PROCEDURE — 72100 X-RAY EXAM L-S SPINE 2/3 VWS: CPT

## 2021-12-21 NOTE — ADDENDUM
[FreeTextEntry1] : Documented by Ethan Dewitt acting as a scribe for Dee Horton on 12/21/2021 . All medical record entries made by the Scribe were at my, Dee Horton , direction and personally dictated by me on 12/21/2021 . I have reviewed the chart and agree that the record accurately reflects my personal performance of the history, physical exam, assessment and plan. I have also personally directed, reviewed, and agreed with the chart.

## 2021-12-21 NOTE — HISTORY OF PRESENT ILLNESS
[Clean/Dry/Intact] : clean, dry and intact [Healed] : healed [Neuro Intact] : an unremarkable neurological exam [Vascular Intact] : ~T peripheral vascular exam normal [Doing Well] : is doing well [Excellent Pain Control] : has excellent pain control [No Sign of Infection] : is showing no signs of infection [Chills] : no chills [Fever] : no fever [Erythema] : not erythematous [Discharge] : absent of discharge [Swelling] : not swollen [Dehiscence] : not dehisced [de-identified] : S/p revision L5-S1 fusion, L4-L5 fusion with interbody. DOS 11-. [de-identified] : 51 year old F S/p L4-L5 and L5-S1 fusion.She does complain of LLE weakness and is tripping up steps. Pain over the lower back is intermittent and mostly well controlled. She recently took tramadol and helped her pain more than Oxycodone, she is taking Tylenol.  [de-identified] : constitutional- No acute distress\par neurologic- no LE radiculitis.\par skin- incision dry clean and intact. The incision was well healed.\par musculoskeletal - 4/5 left dorsi flexion I believe partly due to apprehension. Ambulantes with a cane.  [de-identified] : Xray of the lumbar spine taken 11/30/2021 demonstrates 2 level lumbar fusion, surgical implants appear well positioned. [de-identified] : S/p revision fusion. Postoperatively, the patient is doing well, has excellent pain control and is showing no signs of infection. [de-identified] : Patient has been referred to physical therapy for decreased pain modalities, stretching and strengthening modalities, soft tissue modalities, and physical modalities. Patient was instructed to start home exercises, core strengthening and a sheet was provided. Ambulate as tolerated. The patient will follow up in 4 - 6 weeks for a repeat clinical assessment.

## 2021-12-22 ENCOUNTER — APPOINTMENT (OUTPATIENT)
Dept: INTERNAL MEDICINE | Facility: CLINIC | Age: 51
End: 2021-12-22
Payer: MEDICARE

## 2021-12-22 VITALS
DIASTOLIC BLOOD PRESSURE: 70 MMHG | BODY MASS INDEX: 32.78 KG/M2 | TEMPERATURE: 98.3 F | RESPIRATION RATE: 16 BRPM | SYSTOLIC BLOOD PRESSURE: 120 MMHG | HEIGHT: 63 IN | HEART RATE: 102 BPM | OXYGEN SATURATION: 98 % | WEIGHT: 185 LBS

## 2021-12-22 PROCEDURE — 99214 OFFICE O/P EST MOD 30 MIN: CPT

## 2021-12-22 RX ORDER — VALACYCLOVIR 1 G/1
1 TABLET, FILM COATED ORAL
Qty: 90 | Refills: 1 | Status: DISCONTINUED | COMMUNITY
Start: 2017-07-09 | End: 2021-12-22

## 2021-12-22 NOTE — HISTORY OF PRESENT ILLNESS
[FreeTextEntry1] : HTN FU [de-identified] : She is S/P Lumbar fusion/revision and is overall doing fairly well.   Her pain is fairly manageable. Hopefully will be starting PT in few weeks.  She is scheduled to have Covid booster next week.   \par She is currently seeing Dr. French through [facility] for tx of PTSD.

## 2021-12-22 NOTE — PHYSICAL EXAM
[Normal] : normal rate, regular rhythm, normal S1 and S2 and no murmur heard [de-identified] : Well healed lumbar sx incision. Multiple fully healed sx  scars.

## 2022-01-20 ENCOUNTER — APPOINTMENT (OUTPATIENT)
Dept: ORTHOPEDIC SURGERY | Facility: CLINIC | Age: 52
End: 2022-01-20
Payer: COMMERCIAL

## 2022-01-20 VITALS
HEART RATE: 102 BPM | TEMPERATURE: 98.1 F | HEIGHT: 63 IN | WEIGHT: 185 LBS | BODY MASS INDEX: 32.78 KG/M2 | DIASTOLIC BLOOD PRESSURE: 95 MMHG | SYSTOLIC BLOOD PRESSURE: 144 MMHG

## 2022-01-20 PROCEDURE — 72100 X-RAY EXAM L-S SPINE 2/3 VWS: CPT

## 2022-01-20 PROCEDURE — 99024 POSTOP FOLLOW-UP VISIT: CPT

## 2022-01-20 NOTE — HISTORY OF PRESENT ILLNESS
[Clean/Dry/Intact] : clean, dry and intact [Healed] : healed [Neuro Intact] : an unremarkable neurological exam [Vascular Intact] : ~T peripheral vascular exam normal [Doing Well] : is doing well [Excellent Pain Control] : has excellent pain control [No Sign of Infection] : is showing no signs of infection [Chills] : no chills [Fever] : no fever [Erythema] : not erythematous [Discharge] : absent of discharge [Swelling] : not swollen [Dehiscence] : not dehisced [de-identified] : S/p revision L5-S1 fusion, L4-L5 fusion with interbody. DOS 11-. [de-identified] : 51 year old F S/p L4-L5 and L5-S1 fusion. She states the LLE is weaker than the RLE, she has numbness from the big toe traveling upward to the ankle. She states lumbar flexion causes pain. She states at this time she has reached a baseline. Patient states her RLE is significantly babatunde. She is taking Tramadol at this time. She states when she starts moving in the morning she begins to improve.  [de-identified] : constitutional- No acute distress\par neurologic- no LE radiculitis.\par skin- incision dry clean and intact. The incision was well healed.\par musculoskeletal - 4+/5 left dorsi flexion, all other motor strength 5/5. [de-identified] : Xray of the lumbar spine taken 01/20/2022 demonstrates revision 2 level lumbar fusion with extension up to L4- L5, surgical implants appear. well positioned. [de-identified] : S/p revision fusion [de-identified] : Patient has been referred to physical therapy for decreased pain modalities, stretching and strengthening modalities, soft tissue modalities, and physical modalities. Patient was provided Toradol to be taken 10mg BID The patient will follow up in 6 weeks for a repeat clinical assessment.

## 2022-01-20 NOTE — ADDENDUM
[FreeTextEntry1] : Documented by Ethan Dewitt acting as a scribe for Dr. Humphrey Bhagat on 01/20/2022. All medical record entries made by the Scribe were at my, Dr. Humphrey Bhagat, direction and personally dictated by me on 01/20/2022 . I have reviewed the chart and agree that the record accurately reflects my personal performance of the history, physical exam, assessment and plan. I have also personally directed, reviewed, and agreed with the chart.

## 2022-02-09 ENCOUNTER — APPOINTMENT (OUTPATIENT)
Dept: INTERNAL MEDICINE | Facility: CLINIC | Age: 52
End: 2022-02-09
Payer: COMMERCIAL

## 2022-02-09 VITALS
HEIGHT: 63 IN | OXYGEN SATURATION: 98 % | HEART RATE: 96 BPM | BODY MASS INDEX: 34.55 KG/M2 | SYSTOLIC BLOOD PRESSURE: 124 MMHG | WEIGHT: 195 LBS | TEMPERATURE: 98.5 F | DIASTOLIC BLOOD PRESSURE: 80 MMHG | RESPIRATION RATE: 16 BRPM

## 2022-02-09 DIAGNOSIS — Z01.818 ENCOUNTER FOR OTHER PREPROCEDURAL EXAMINATION: ICD-10-CM

## 2022-02-09 DIAGNOSIS — F43.10 POST-TRAUMATIC STRESS DISORDER, UNSPECIFIED: ICD-10-CM

## 2022-02-09 PROCEDURE — 99215 OFFICE O/P EST HI 40 MIN: CPT

## 2022-02-09 RX ORDER — TRAMADOL HYDROCHLORIDE 50 MG/1
50 TABLET, COATED ORAL
Qty: 56 | Refills: 0 | Status: DISCONTINUED | COMMUNITY
Start: 2021-12-21 | End: 2022-02-09

## 2022-02-09 RX ORDER — MULTIVIT/FOLIC ACID/HERBAL 223 400 MCG
TABLET ORAL
Refills: 0 | Status: DISCONTINUED | COMMUNITY
End: 2022-02-09

## 2022-02-09 RX ORDER — PREGABALIN 150 MG/1
150 CAPSULE ORAL
Qty: 60 | Refills: 0 | Status: DISCONTINUED | COMMUNITY
Start: 2021-09-10 | End: 2022-02-09

## 2022-02-09 RX ORDER — ERENUMAB-AOOE 70 MG/ML
70 INJECTION SUBCUTANEOUS
Refills: 0 | Status: DISCONTINUED | COMMUNITY
End: 2022-02-09

## 2022-02-09 RX ORDER — PANTOPRAZOLE 40 MG/1
40 TABLET, DELAYED RELEASE ORAL
Qty: 30 | Refills: 0 | Status: DISCONTINUED | COMMUNITY
Start: 2021-10-20 | End: 2022-02-09

## 2022-02-09 RX ORDER — RIMEGEPANT SULFATE 75 MG/75MG
75 TABLET, ORALLY DISINTEGRATING ORAL
Refills: 0 | Status: DISCONTINUED | COMMUNITY
End: 2022-02-09

## 2022-02-09 RX ORDER — METHOCARBAMOL 750 MG/1
750 TABLET, FILM COATED ORAL
Qty: 90 | Refills: 0 | Status: DISCONTINUED | COMMUNITY
Start: 2021-12-07 | End: 2022-02-09

## 2022-02-09 RX ORDER — KETOROLAC TROMETHAMINE 10 MG/1
10 TABLET, FILM COATED ORAL 3 TIMES DAILY
Qty: 15 | Refills: 0 | Status: DISCONTINUED | COMMUNITY
Start: 2022-01-20 | End: 2022-02-09

## 2022-02-09 RX ORDER — OMEPRAZOLE 20 MG/1
TABLET, DELAYED RELEASE ORAL
Refills: 0 | Status: ACTIVE | COMMUNITY

## 2022-02-09 RX ORDER — LINACLOTIDE 145 UG/1
145 CAPSULE, GELATIN COATED ORAL
Qty: 30 | Refills: 0 | Status: DISCONTINUED | COMMUNITY
Start: 2021-10-20 | End: 2022-02-09

## 2022-02-09 RX ORDER — SUVOREXANT 20 MG/1
20 TABLET, FILM COATED ORAL
Refills: 0 | Status: DISCONTINUED | COMMUNITY
End: 2022-02-09

## 2022-02-09 RX ORDER — OXYCODONE 5 MG/1
5 TABLET ORAL EVERY 4 HOURS
Qty: 42 | Refills: 0 | Status: DISCONTINUED | COMMUNITY
Start: 2021-12-07 | End: 2022-02-09

## 2022-02-10 ENCOUNTER — NON-APPOINTMENT (OUTPATIENT)
Age: 52
End: 2022-02-10

## 2022-02-10 LAB
ALBUMIN SERPL ELPH-MCNC: 4.6 G/DL
ALP BLD-CCNC: 167 U/L
ALT SERPL-CCNC: 16 U/L
ANION GAP SERPL CALC-SCNC: 13 MMOL/L
AST SERPL-CCNC: 21 U/L
BASOPHILS # BLD AUTO: 0.05 K/UL
BASOPHILS NFR BLD AUTO: 0.6 %
BILIRUB SERPL-MCNC: 0.2 MG/DL
BUN SERPL-MCNC: 15 MG/DL
CALCIUM SERPL-MCNC: 9.2 MG/DL
CHLORIDE SERPL-SCNC: 104 MMOL/L
CO2 SERPL-SCNC: 25 MMOL/L
CREAT SERPL-MCNC: 0.49 MG/DL
EOSINOPHIL # BLD AUTO: 0.16 K/UL
EOSINOPHIL NFR BLD AUTO: 1.9 %
GLUCOSE SERPL-MCNC: 96 MG/DL
HCT VFR BLD CALC: 36.6 %
HGB BLD-MCNC: 11.5 G/DL
IMM GRANULOCYTES NFR BLD AUTO: 0.2 %
LYMPHOCYTES # BLD AUTO: 2.58 K/UL
LYMPHOCYTES NFR BLD AUTO: 30.6 %
MAN DIFF?: NORMAL
MCHC RBC-ENTMCNC: 27.3 PG
MCHC RBC-ENTMCNC: 31.4 GM/DL
MCV RBC AUTO: 86.7 FL
MONOCYTES # BLD AUTO: 0.74 K/UL
MONOCYTES NFR BLD AUTO: 8.8 %
NEUTROPHILS # BLD AUTO: 4.89 K/UL
NEUTROPHILS NFR BLD AUTO: 57.9 %
PLATELET # BLD AUTO: 340 K/UL
POTASSIUM SERPL-SCNC: 4.2 MMOL/L
PROT SERPL-MCNC: 7 G/DL
RBC # BLD: 4.22 M/UL
RBC # FLD: 14.1 %
SODIUM SERPL-SCNC: 142 MMOL/L
WBC # FLD AUTO: 8.44 K/UL

## 2022-02-10 NOTE — PLAN
[FreeTextEntry1] : Check CBC, CMP today - \par Pt optimized pending results of this BW.  \par \par Advised to hold all vitamins, NSAIDS including Motrin, Advil, Aleve, ASA, fish oil, all supplements 7 days prior to surgery.  \par \par Close airway monitoring and  oxygen saturation  monitoring is required\par \par Pt advised to restart Bystolic 5 mg qd.  \par \par Continue medications as directed.\par \par   DVT prophylaxis\par \par See  for persistent heel pain.\par \par FU next month as planned. \par \par \par

## 2022-02-10 NOTE — ADDENDUM
[FreeTextEntry1] : \par BW reviewed.  Patient optimized and may proceed with the planned EGD/Colonoscopy. \par \par Alk Phos 167.  Will add isoenzymes to further evaluate.  Pt is S/P spinal fusion 11/21.  Elevation likely related to recent Sx.  \par \par FU OV next month as planned.

## 2022-02-10 NOTE — RESULTS/DATA
[] : results reviewed [de-identified] : alk phos 167 [de-identified] : 11/1/21 EKG NSR 64 bpm, normal axis and intervals.  No ac [place] changes.   [de-identified] : 11/1/21 A1C 5.7

## 2022-02-10 NOTE — HISTORY OF PRESENT ILLNESS
[No Pertinent Cardiac History] : no history of aortic stenosis, atrial fibrillation, coronary artery disease, recent myocardial infarction, or implantable device/pacemaker [No Pertinent Pulmonary History] : no history of asthma, COPD, sleep apnea, or smoking [No Adverse Anesthesia Reaction] : no adverse anesthesia reaction in self or family member [Diabetes] : diabetes [(Patient denies any chest pain, claudication, dyspnea on exertion, orthopnea, palpitations or syncope)] : Patient denies any chest pain, claudication, dyspnea on exertion, orthopnea, palpitations or syncope [Chronic Anticoagulation] : no chronic anticoagulation [Chronic Kidney Disease] : no chronic kidney disease [FreeTextEntry1] : colonoscopy and endoscopy [FreeTextEntry2] : 2/18/22 [FreeTextEntry3] : Dr. Matos [FreeTextEntry4] : 51 year old female PMH HLD, HTN, depression, FM, Gerd, Pre-DM, Lumbar DDD, H/O Spinal fusion L5 S1 with recent revision  presents for preop exam. She  was evaluated by Dr. Truong GI as she was having epigastric discomfort along with diarrhea, constipation and BRBPR.  Pain resolved with use of Omeprazole and no further bleeding.  She continues to have intermittent bowel issues and above procedures are planned.   \par She is S/P spinal Sx last Nov and is currently undergoing PT.  Symptoms have been improving. She has had persistent BL heel and midfoot pain which did not improve with surgery. \par    Pt reports BP has been elevated at recent OVs.   Unfortunately  she has not been taking Bystolic for several weeks.  \par She continues close FU with her psychiatrist.\par Currently she feels well and denies chest pain, cough, SOB, fevers, abdominal pain.   [FreeTextEntry8] : Able to walk 1-2 blocks or  climb flight of stairs without any chest pain or SOB

## 2022-02-10 NOTE — PHYSICAL EXAM
[Normal Outer Ear/Nose] : the outer ears and nose were normal in appearance [Normal Oropharynx] : the oropharynx was normal [Normal] : normal rate, regular rhythm, normal S1 and S2 and no murmur heard [Pedal Pulses Present] : the pedal pulses are present [No Edema] : there was no peripheral edema [No Extremity Clubbing/Cyanosis] : no extremity clubbing/cyanosis [Soft] : abdomen soft [Non Tender] : non-tender [de-identified] : Well healed lumbar sx incision. Multiple fully healed sx  scars.  [de-identified] : feet without erythema or swelling.  FROM

## 2022-02-15 ENCOUNTER — APPOINTMENT (OUTPATIENT)
Dept: NEUROLOGY | Facility: CLINIC | Age: 52
End: 2022-02-15
Payer: MEDICARE

## 2022-02-23 LAB
ALP BLD-CCNC: 176 IU/L
ALP BONE CFR SERPL: 42 %
ALP INTEST CFR SERPL: 5 %
ALP LIVER CFR SERPL: 53 %

## 2022-03-08 ENCOUNTER — APPOINTMENT (OUTPATIENT)
Dept: INTERNAL MEDICINE | Facility: CLINIC | Age: 52
End: 2022-03-08

## 2022-03-14 ENCOUNTER — APPOINTMENT (OUTPATIENT)
Dept: INTERNAL MEDICINE | Facility: CLINIC | Age: 52
End: 2022-03-14

## 2022-03-21 ENCOUNTER — APPOINTMENT (OUTPATIENT)
Dept: ORTHOPEDIC SURGERY | Facility: CLINIC | Age: 52
End: 2022-03-21
Payer: MEDICARE

## 2022-03-21 DIAGNOSIS — M79.671 PAIN IN RIGHT FOOT: ICD-10-CM

## 2022-03-21 DIAGNOSIS — M79.672 PAIN IN RIGHT FOOT: ICD-10-CM

## 2022-03-21 DIAGNOSIS — R22.42 LOCALIZED SWELLING, MASS AND LUMP, LEFT LOWER LIMB: ICD-10-CM

## 2022-03-21 DIAGNOSIS — M77.41 METATARSALGIA, RIGHT FOOT: ICD-10-CM

## 2022-03-21 DIAGNOSIS — M77.42 METATARSALGIA, RIGHT FOOT: ICD-10-CM

## 2022-03-21 DIAGNOSIS — R22.41 LOCALIZED SWELLING, MASS AND LUMP, RIGHT LOWER LIMB: ICD-10-CM

## 2022-03-21 PROCEDURE — 73630 X-RAY EXAM OF FOOT: CPT | Mod: 50

## 2022-03-21 PROCEDURE — 99214 OFFICE O/P EST MOD 30 MIN: CPT

## 2022-03-21 PROCEDURE — 99072 ADDL SUPL MATRL&STAF TM PHE: CPT

## 2022-03-21 NOTE — ADDENDUM
[FreeTextEntry1] : I, Carol Nichole, acted solely as a scribe for Dr. Patric Sebastian on this date 03/21/2022.\par \par All medical record entries made by the Scribe were at my, Dr. Patric Sebastian, direction and personally dictated by me on 03/21/2022 . I have reviewed the chart and agree that the record accurately reflects my personal performance of the history, physical exam, assessment and plan. I have also personally directed, reviewed, and agreed with the chart.	\par
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
## 2022-03-21 NOTE — DISCUSSION/SUMMARY
[de-identified] : At this time I recommend an MRI bilateral feet.  Evaluate for stress fractures in both feet.  Evaluate the fourth toes of both feet as well.  Has been having pain for greater than 3 months and has failed conservative management which includes anti-inflammatories, proper shoewear, rest, home exercise and stretching program.  We will see her back here post MRIs of both feet to review.  In the interim she could use over-the-counter anti-inflammatories and Tylenol for pain.  All questions answered.

## 2022-03-21 NOTE — PHYSICAL EXAM
[de-identified] : Right foot Physical Examination:\par \par General: Alert and oriented x3.  In no acute distress.  Pleasant in nature with a normal affect.  No apparent respiratory distress. \par Erythema, Warmth, Rubor: Negative\par Swelling: Positive swelling of the fourth toe.\par \par ROM Ankle:\par 1. Dorsiflexion: 10 degrees\par 2. Plantarflexion: 40 degrees\par 3. Inversion: 20 degrees\par 4. Eversion: 10 degrees\par \par ROM of digits: Normal\par \par Pes Planus: Negative\par Pes Cavus: Negative\par \par Bunion: Negative\par Elicia's Bunion (Bunionette): Negative\par Hammer Toe Deformity/Deformities: Negative\par \par Tenderness to Palpation: \par 1. Heel Pain: Negative\par 2. Midfoot Pain: Negative\par 3. First MTP Joint: Negative\par 4. Lis Franc Joint: Negative\par \par Tenderness Metatarsals:\par 1st MT: Negative\par 2nd MT: Negative\par 3rd MT: Negative\par 4th MT: Positive with severe pain and swelling to the fourth toe. Right greater than left tenderness. \par 5th MT: Negative\par Base of the 5th MT: Negative\par \par Ligament Pain:\par 1. Lis Franc Ligament: Negative\par 2. Plantar Fascia Ligament: Negative\par \par Strength: \par 5/5 TA/GS/EHL/FHL/EDL/ADD/ABD\par \par Pulses: 2+ DP/PT Pulses\par \par Capillary Refill Toes: <2 seconds\par \par Neuro: Intact motor and sensory throughout\par \par Additional Test:\par 1. Pope's Squeeze Test: Negative\par 2. Calcaneal Squeeze Test: Negative\par \par \par Left foot Physical Examination:\par \par General: Alert and oriented x3.  In no acute distress.  Pleasant in nature with a normal affect.  No apparent respiratory distress. \par Erythema, Warmth, Rubor: Negative\par Swelling: Positive swelling of the fourth toe and second toe of the left foot.\par \par ROM Ankle:\par 1. Dorsiflexion: 10 degrees\par 2. Plantarflexion: 40 degrees\par 3. Inversion: 20 degrees\par 4. Eversion: 10 degrees\par \par ROM of digits: Normal\par \par Pes Planus: Negative\par Pes Cavus: Negative\par \par Bunion: Negative\par Elicia's Bunion (Bunionette): Negative\par Hammer Toe Deformity/Deformities: Negative\par \par Tenderness to Palpation: \par 1. Heel Pain: Negative\par 2. Midfoot Pain: Negative\par 3. First MTP Joint: Negative\par 4. Lis Franc Joint: Negative\par \par Tenderness Metatarsals:\par 1st MT: Negative\par 2nd MT: Negative\par 3rd MT: Negative\par 4th MT: Positive\par 5th MT: Negative\par Base of the 5th MT: Negative\par \par *Patient has tenderness when palpating the fourth toe.  She also has tenderness when palpating the plantar aspect of the second toe at the second MTPJ.\par \par Ligament Pain:\par 1. Lis Franc Ligament: Negative\par 2. Plantar Fascia Ligament: Negative\par \par Strength: \par 5/5 TA/GS/EHL/FHL/EDL/ADD/ABD\par \par Pulses: 2+ DP/PT Pulses\par \par Capillary Refill Toes: <2 seconds\par \par Neuro: Intact motor and sensory throughout\par \par Additional Test:\par 1. Pope's Squeeze Test: Negative\par 2. Calcaneal Squeeze Test: Negative [de-identified] : Bilateral foot x-rays reviewed, 3/21/2022: No fracture seen. Talonavicular arthrosis.

## 2022-04-25 ENCOUNTER — APPOINTMENT (OUTPATIENT)
Dept: INTERNAL MEDICINE | Facility: CLINIC | Age: 52
End: 2022-04-25
Payer: MEDICARE

## 2022-04-25 VITALS
SYSTOLIC BLOOD PRESSURE: 120 MMHG | DIASTOLIC BLOOD PRESSURE: 70 MMHG | TEMPERATURE: 98.3 F | BODY MASS INDEX: 34.91 KG/M2 | WEIGHT: 197 LBS | HEART RATE: 70 BPM | RESPIRATION RATE: 16 BRPM | HEIGHT: 63 IN

## 2022-04-25 LAB
BILIRUB UR QL STRIP: NORMAL
GLUCOSE UR-MCNC: NORMAL
HCG UR QL: 0.2 EU/DL
HGB UR QL STRIP.AUTO: NORMAL
KETONES UR-MCNC: NORMAL
LEUKOCYTE ESTERASE UR QL STRIP: NORMAL
NITRITE UR QL STRIP: NORMAL
PH UR STRIP: 5.5
PROT UR STRIP-MCNC: NORMAL
SP GR UR STRIP: 1.02

## 2022-04-25 PROCEDURE — 99213 OFFICE O/P EST LOW 20 MIN: CPT | Mod: 25

## 2022-04-25 PROCEDURE — 81003 URINALYSIS AUTO W/O SCOPE: CPT | Mod: QW

## 2022-04-25 NOTE — PHYSICAL EXAM
[Normal Oropharynx] : the oropharynx was normal [Supple] : supple [No Extremity Clubbing/Cyanosis] : no extremity clubbing/cyanosis [Soft] : abdomen soft [Non Tender] : non-tender [Non-distended] : non-distended [Normal Bowel Sounds] : normal bowel sounds [No CVA Tenderness] : no CVA  tenderness [Normal] : affect was normal and insight and judgment were intact

## 2022-04-27 LAB
APPEARANCE: CLEAR
BACTERIA UR CULT: NORMAL
BACTERIA: NEGATIVE
BILIRUBIN URINE: NEGATIVE
BLOOD URINE: NEGATIVE
COLOR: YELLOW
GLUCOSE QUALITATIVE U: NEGATIVE
HYALINE CASTS: 1 /LPF
KETONES URINE: NEGATIVE
LEUKOCYTE ESTERASE URINE: NEGATIVE
MICROSCOPIC-UA: NORMAL
NITRITE URINE: NEGATIVE
PH URINE: 5.5
PROTEIN URINE: NORMAL
RED BLOOD CELLS URINE: 1 /HPF
SPECIFIC GRAVITY URINE: 1.03
SQUAMOUS EPITHELIAL CELLS: 1 /HPF
UROBILINOGEN URINE: NORMAL
WHITE BLOOD CELLS URINE: 0 /HPF

## 2022-04-28 ENCOUNTER — NON-APPOINTMENT (OUTPATIENT)
Age: 52
End: 2022-04-28

## 2022-05-06 ENCOUNTER — NON-APPOINTMENT (OUTPATIENT)
Age: 52
End: 2022-05-06

## 2022-05-06 ENCOUNTER — APPOINTMENT (OUTPATIENT)
Dept: NEUROLOGY | Facility: CLINIC | Age: 52
End: 2022-05-06
Payer: MEDICARE

## 2022-05-06 VITALS
BODY MASS INDEX: 32.78 KG/M2 | HEIGHT: 63 IN | WEIGHT: 185 LBS | DIASTOLIC BLOOD PRESSURE: 83 MMHG | SYSTOLIC BLOOD PRESSURE: 123 MMHG | HEART RATE: 73 BPM | TEMPERATURE: 97.7 F

## 2022-05-06 DIAGNOSIS — G43.119 MIGRAINE WITH AURA, INTRACTABLE, W/OUT STATUS MIGRAINOSUS: ICD-10-CM

## 2022-05-06 DIAGNOSIS — G43.009 MIGRAINE W/OUT AURA, NOT INTRACTABLE, W/OUT STATUS MIGRAINOSUS: ICD-10-CM

## 2022-05-06 PROCEDURE — 99204 OFFICE O/P NEW MOD 45 MIN: CPT

## 2022-05-06 RX ORDER — OXCARBAZEPINE 300 MG/1
300 TABLET, FILM COATED ORAL
Qty: 180 | Refills: 1 | Status: ACTIVE | COMMUNITY
Start: 2021-09-16

## 2022-05-06 RX ORDER — EPINEPHRINE 0.3 MG/.3ML
0.3 INJECTION INTRAMUSCULAR
Qty: 2 | Refills: 0 | Status: DISCONTINUED | COMMUNITY
Start: 2017-11-03 | End: 2022-05-06

## 2022-05-06 RX ORDER — DULOXETINE HYDROCHLORIDE 60 MG/1
60 CAPSULE, DELAYED RELEASE PELLETS ORAL TWICE DAILY
Qty: 180 | Refills: 0 | Status: ACTIVE | COMMUNITY

## 2022-05-06 NOTE — HISTORY OF PRESENT ILLNESS
[FreeTextEntry1] : 53-year-old right-handed female with PMHx of HLD, PTSD/depression, lumbar disc herniation status post lumbar fusion in 11/20/2021 with remarkable recovery; has had migraine headaches since teenage years, now presents with increasing frequency and intensity of migraine headaches.\par \par Patient reports that since teenage years she has been experiencing migraine headaches that are usually associated with photophobia, nausea occasionally has visual aura as well, during younger years migraines occurred around the menstrual period, lasted 2-3 days, she underwent partial hysterectomy at age 31, did not notice any change in the migraine frequency or intensity.  Patient reports she has been following up with a neurologist Dr. Mckeon, was tried on topiramate took it for many years, did not notice any improvement, she may have been tried on few other medicines does not recall the names, the last medicine tried was Aimovig injections, she reported no relief with the medicine as well and has not followed up with a neurologist since.\par \par At present, patient reports she gets about 5-6 headaches per week, but 1 is severe intractable throbbing in character associated with visual aura, she reports she is unable to function when she has the headache, on the other 4 days she gets milder headaches not associated with nausea, photophobia or visual aura.  Patient takes NSAIDs to obtain relief.\par \par She has history of mood disorder related to PTSD, she is under care of a psychiatrist, she currently takes duloxetine 60 mg twice daily, trazodone 100 mg at bedtime and oxcarbazepine 300 mg twice daily.

## 2022-05-06 NOTE — REASON FOR VISIT
[Consultation] : a consultation visit [FreeTextEntry1] : Referred by Dr. Jackman for evaluation of migraine headaches

## 2022-05-06 NOTE — REVIEW OF SYSTEMS
[Negative] : Heme/Lymph [Numbness] : numbness [Migraine Headache] : migraine headaches [Anxiety] : anxiety [Depression] : depression [As Noted in HPI] : as noted in HPI [de-identified] : PTSD

## 2022-05-06 NOTE — DISCUSSION/SUMMARY
[FreeTextEntry1] : 53-year-old F with PMHx of HLD, PTSD/depression, lumbar disc herniation s/p lumbar fusion in 11/20/2021 with remarkable recovery; has had migraine headaches since teenage years, was tried on few prophylactic medications by another neurologist did not notice any improvement, she now presents with increasing frequency and intensity of migraine headaches.\par \par #Migraines with aura intractable, frequency 4/month \par \par - Trial with Ubrellvy, 50 MGs at the onset, may repeat in 2-3 hours, adverse effects discussed with the patient\par -We will obtain MRI of the brain\par \par #Migraines without aura not intractable about 15-18/month.\par \par -Patient needs to be on a prophylactic medication, she has failed topiramate, Aimovig and few other meds which she does not recall.  She is also on duloxetine, trazodone and oxcarbazepine, hence the choices (nortriptyline/Depakote) are limited, may consider beta-blocker or calcium channel blocker.\par -I recommended starting Migrelief 1 capsule daily\par -Have advised the patient to maintain a headache diary, note the triggers, pattern and frequency\par

## 2022-05-06 NOTE — PHYSICAL EXAM
[General Appearance - Alert] : alert [General Appearance - In No Acute Distress] : in no acute distress [Oriented To Time, Place, And Person] : oriented to person, place, and time [Impaired Insight] : insight and judgment were intact [Affect] : the affect was normal [Person] : oriented to person [Place] : oriented to place [Time] : oriented to time [Registration Intact] : recent registration memory intact [Concentration Intact] : normal concentrating ability [Naming Objects] : no difficulty naming common objects [Repeating Phrases] : no difficulty repeating a phrase [Fluency] : fluency intact [Comprehension] : comprehension intact [Past History] : adequate knowledge of personal past history [Cranial Nerves Optic (II)] : visual acuity intact bilaterally,  visual fields full to confrontation, pupils equal round and reactive to light [Cranial Nerves Oculomotor (III)] : extraocular motion intact [Cranial Nerves Trigeminal (V)] : facial sensation intact symmetrically [Cranial Nerves Facial (VII)] : face symmetrical [Cranial Nerves Vestibulocochlear (VIII)] : hearing was intact bilaterally [Cranial Nerves Glossopharyngeal (IX)] : tongue and palate midline [Cranial Nerves Accessory (XI - Cranial And Spinal)] : head turning and shoulder shrug symmetric [Cranial Nerves Hypoglossal (XII)] : there was no tongue deviation with protrusion [Motor Tone] : muscle tone was normal in all four extremities [Motor Strength] : muscle strength was normal in all four extremities [No Muscle Atrophy] : normal bulk in all four extremities [Sensation Tactile Decrease] : light touch was intact [Balance] : balance was intact [2+] : Patella left 2+ [0] : Ankle jerk left 0 [PERRL With Normal Accommodation] : pupils were equal in size, round, reactive to light, with normal accommodation [Extraocular Movements] : extraocular movements were intact [Full Visual Field] : full visual field [Outer Ear] : the ears and nose were normal in appearance [Hearing Threshold Finger Rub Not [place]] : hearing was normal [Neck Cervical Mass (___cm)] : no neck mass was observed [Auscultation Breath Sounds / Voice Sounds] : lungs were clear to auscultation bilaterally [Heart Rate And Rhythm] : heart rate was normal and rhythm regular [Heart Sounds] : normal S1 and S2 [Arterial Pulses Carotid] : carotid pulses were normal with no bruits [Edema] : there was no peripheral edema [Abnormal Walk] : normal gait [Involuntary Movements] : no involuntary movements were seen [] : no rash [Past-pointing] : there was no past-pointing [Tremor] : no tremor present [Coordination - Dysmetria Impaired Finger-to-Nose Bilateral] : not present [Plantar Reflex Right Only] : normal on the right [Plantar Reflex Left Only] : normal on the left

## 2022-05-17 ENCOUNTER — APPOINTMENT (OUTPATIENT)
Dept: ORTHOPEDIC SURGERY | Facility: CLINIC | Age: 52
End: 2022-05-17
Payer: MEDICARE

## 2022-05-17 VITALS
WEIGHT: 185 LBS | HEART RATE: 62 BPM | DIASTOLIC BLOOD PRESSURE: 72 MMHG | HEIGHT: 63 IN | SYSTOLIC BLOOD PRESSURE: 119 MMHG | BODY MASS INDEX: 32.78 KG/M2

## 2022-05-17 PROCEDURE — 72100 X-RAY EXAM L-S SPINE 2/3 VWS: CPT

## 2022-05-17 PROCEDURE — 99213 OFFICE O/P EST LOW 20 MIN: CPT

## 2022-05-24 ENCOUNTER — APPOINTMENT (OUTPATIENT)
Dept: MRI IMAGING | Facility: CLINIC | Age: 52
End: 2022-05-24
Payer: MEDICARE

## 2022-05-24 PROCEDURE — 70551 MRI BRAIN STEM W/O DYE: CPT

## 2022-05-25 ENCOUNTER — NON-APPOINTMENT (OUTPATIENT)
Age: 52
End: 2022-05-25

## 2022-06-02 ENCOUNTER — APPOINTMENT (OUTPATIENT)
Dept: INTERNAL MEDICINE | Facility: CLINIC | Age: 52
End: 2022-06-02

## 2022-06-30 ENCOUNTER — APPOINTMENT (OUTPATIENT)
Dept: INTERNAL MEDICINE | Facility: CLINIC | Age: 52
End: 2022-06-30

## 2022-06-30 ENCOUNTER — NON-APPOINTMENT (OUTPATIENT)
Age: 52
End: 2022-06-30

## 2022-06-30 VITALS
HEART RATE: 65 BPM | OXYGEN SATURATION: 97 % | HEIGHT: 63 IN | BODY MASS INDEX: 33.66 KG/M2 | TEMPERATURE: 98.6 F | WEIGHT: 190 LBS | DIASTOLIC BLOOD PRESSURE: 80 MMHG | RESPIRATION RATE: 16 BRPM | SYSTOLIC BLOOD PRESSURE: 120 MMHG

## 2022-06-30 DIAGNOSIS — E04.9 NONTOXIC GOITER, UNSPECIFIED: ICD-10-CM

## 2022-06-30 DIAGNOSIS — R82.90 UNSPECIFIED ABNORMAL FINDINGS IN URINE: ICD-10-CM

## 2022-06-30 DIAGNOSIS — R74.8 ABNORMAL LEVELS OF OTHER SERUM ENZYMES: ICD-10-CM

## 2022-06-30 DIAGNOSIS — E66.9 OBESITY, UNSPECIFIED: ICD-10-CM

## 2022-06-30 PROCEDURE — 99214 OFFICE O/P EST MOD 30 MIN: CPT | Mod: 25

## 2022-06-30 PROCEDURE — G0439: CPT

## 2022-06-30 PROCEDURE — 90715 TDAP VACCINE 7 YRS/> IM: CPT

## 2022-06-30 PROCEDURE — 90471 IMMUNIZATION ADMIN: CPT

## 2022-06-30 RX ORDER — VITAMIN B COMPLEX
CAPSULE ORAL
Refills: 0 | Status: DISCONTINUED | COMMUNITY
End: 2022-06-30

## 2022-06-30 RX ORDER — GABAPENTIN 300 MG/1
300 CAPSULE ORAL
Qty: 90 | Refills: 1 | Status: ACTIVE | COMMUNITY
Start: 2022-06-27

## 2022-06-30 RX ORDER — SODIUM SULFATE, POTASSIUM SULFATE, MAGNESIUM SULFATE 17.5; 3.13; 1.6 G/ML; G/ML; G/ML
17.5-3.13-1.6 SOLUTION, CONCENTRATE ORAL
Qty: 354 | Refills: 0 | Status: DISCONTINUED | COMMUNITY
Start: 2021-10-20 | End: 2022-06-30

## 2022-06-30 RX ORDER — B2/MAGNESIUM CIT,OXID/FEVERFEW 200-180-50
200-180-50 TABLET ORAL
Qty: 90 | Refills: 0 | Status: DISCONTINUED | COMMUNITY
Start: 2022-05-06 | End: 2022-06-30

## 2022-06-30 RX ORDER — MULTIVIT-MIN/IRON/FOLIC ACID/K 18-600-40
50 MCG CAPSULE ORAL
Refills: 0 | Status: DISCONTINUED | COMMUNITY
Start: 2021-10-19 | End: 2022-06-30

## 2022-06-30 RX ORDER — ESTRADIOL 0.1 MG/G
0.1 CREAM VAGINAL
Qty: 43 | Refills: 0 | Status: DISCONTINUED | COMMUNITY
Start: 2022-02-01 | End: 2022-06-30

## 2022-06-30 RX ORDER — GABAPENTIN 600 MG/1
600 TABLET, COATED ORAL
Qty: 90 | Refills: 2 | Status: DISCONTINUED | COMMUNITY
Start: 2022-05-17 | End: 2022-06-30

## 2022-06-30 RX ORDER — TRAZODONE HYDROCHLORIDE 100 MG/1
100 TABLET ORAL
Refills: 0 | Status: DISCONTINUED | COMMUNITY
End: 2022-06-30

## 2022-06-30 RX ORDER — MAGNESIUM OXIDE/MAG AA CHELATE 300 MG
CAPSULE ORAL
Refills: 0 | Status: DISCONTINUED | COMMUNITY
End: 2022-06-30

## 2022-06-30 RX ORDER — MULTIVIT-MIN/IRON/FOLIC ACID/K 18-600-40
CAPSULE ORAL
Refills: 0 | Status: DISCONTINUED | COMMUNITY
End: 2022-06-30

## 2022-06-30 RX ORDER — UBROGEPANT 100 MG/1
100 TABLET ORAL
Qty: 10 | Refills: 2 | Status: DISCONTINUED | COMMUNITY
Start: 2022-05-06 | End: 2022-06-30

## 2022-06-30 NOTE — PLAN
[FreeTextEntry1] : 1. Patient given prescription to obtain fasting labs\par \par 2. Patient will obtain thyroid US for baseline evaluation of goiter\par \par 3. Patient has been referred to endocrinology for weight loss measures- Dr. Yudi Bueno\par \par 4. Patient will obtain DEXA screening\par \par 5. Patient will trial robaxin for RLS symptoms \par \par 6. Patient to receive TDAP now\par \par 7. F/u in 1 year or sooner if needed

## 2022-06-30 NOTE — HEALTH RISK ASSESSMENT
[Never] : Never [Never (0 pts)] : Never (0 points) [No] : In the past 12 months have you used drugs other than those required for medical reasons? No [3] : 2) Feeling down, depressed, or hopeless for nearly every day (3) [Nearly Every Day (3)] : 5.) Poor appetite or overeating? Nearly every day [1/2 of Days or More (2)] : 6.) Feeling bad about yourself, or that you are a failure, or have let yourself or your family down? Half the days or more [Not at All (0)] : 7.) Trouble concentrating on things, such as reading a newspaper or watching television? Not at all [Several Days (1)] : 8.) Moving or speaking so slowly that other people could have noticed, or the opposite, moving or speaking faster than usual? Several days [Severe] : severity of depression is severe [Very Difficult] : How difficult have these problems made it for you to do your work, take care of things at home, or get along with people? Very difficult [[id number]] : 21

## 2022-06-30 NOTE — PHYSICAL EXAM
[No Acute Distress] : no acute distress [Normal Oropharynx] : the oropharynx was normal [Normal TMs] : both tympanic membranes were normal [No Respiratory Distress] : no respiratory distress  [No Accessory Muscle Use] : no accessory muscle use [Clear to Auscultation] : lungs were clear to auscultation bilaterally [Normal Rate] : normal rate  [Regular Rhythm] : with a regular rhythm [Normal S1, S2] : normal S1 and S2 [No Edema] : there was no peripheral edema [Soft] : abdomen soft [Non Tender] : non-tender [Non-distended] : non-distended [Normal Bowel Sounds] : normal bowel sounds [Normal Gait] : normal gait [Normal Affect] : the affect was normal [Alert and Oriented x3] : oriented to person, place, and time [de-identified] : Goiter

## 2022-06-30 NOTE — HISTORY OF PRESENT ILLNESS
[FreeTextEntry1] : Annual wellness visit [de-identified] : Patient is a 52- year- old female with extensive PMH who presents to office today for Annual Wellness Visit.\par \par Patient reports she has overall been doing well. She does state that she has been experiencing muscle cramps and increased RLS symptoms. She has been taking RLS supplements OTC with mild relief. \par \par PHQ9 Reviewed today- mildly positive. Sees psychiatrist regularly. Feels her depression is stemming from self esteem issues. She is self conscious about her weight\par \par Patient reports she walks often. She tries to consume healthy diet. She does not do any formal exercise\par \par Patient is UTD with her vaccines with the exception of tetanus\par Moderna x3. Covid in 2020\par \par Yearly screenings:\par \par Mammogram 2022\par PAP upcoming 7/2022\par Colonoscopy/ endoscopy 2022\par Dental/ vision Due now\par DEXA Due now\par \par Denies smoking, drugs, alcohol use \par \par No acute complaints today.

## 2022-07-28 ENCOUNTER — NON-APPOINTMENT (OUTPATIENT)
Age: 52
End: 2022-07-28

## 2022-08-18 ENCOUNTER — APPOINTMENT (OUTPATIENT)
Dept: ORTHOPEDIC SURGERY | Facility: CLINIC | Age: 52
End: 2022-08-18

## 2022-08-18 VITALS
HEART RATE: 61 BPM | SYSTOLIC BLOOD PRESSURE: 141 MMHG | BODY MASS INDEX: 33.66 KG/M2 | DIASTOLIC BLOOD PRESSURE: 87 MMHG | WEIGHT: 190 LBS | HEIGHT: 63 IN

## 2022-08-18 PROCEDURE — 99213 OFFICE O/P EST LOW 20 MIN: CPT

## 2022-08-18 NOTE — PHYSICAL EXAM
[Obese] : obese [Poor Appearance] : well-appearing [Acute Distress] : not in acute distress [de-identified] : CONSTITUTIONAL: The patient is a very pleasant individual who is well-nourished and who appears stated age.\par PSYCHIATRIC: The patient is alert and oriented X 3 and in no apparent distress, and participates with orthopedic evaluation well.\par HEAD: Atraumatic and is nonsyndromic in appearance.\par EENT: No visible thyromegaly, EOMI.\par RESPIRATORY: Respiratory rate is regular, not dyspneic on examination.\par LYMPHATICS: There is no inguinal lymphadenopathy\par INTEGUMENTARY: Skin is clean, dry, and intact about the bilateral lower extremities and lumbar spine.\par VASCULAR: There is brisk capillary refill about the bilateral lower extremities.\par NEUROLOGIC: There are no pathologic reflexes. There is no decrease in sensation of the bilateral lower extremities on Wartenberg pinwheel examination. Deep tendon reflexes are well maintained at 2+/4 of the bilateral lower extremities and are symmetric.\par MUSCULOSKELETAL: There is no visible muscular atrophy. Manual motor strength is well maintained in the bilateral lower extremities. Range of motion of lumbar spine is well maintained. Negative tension sign and straight leg raise bilaterally. Quad extension, ankle dorsiflexion, EHL, plantar flexion, and ankle eversion are well preserved. Normal secondary orthopaedic exam of bilateral hips, greater trochanteric area, knees and ankles. No pain with internal or external rotation of the bilateral hips.  [de-identified] : AP and lateral images of the lumbar spine taken 01/20/2022 demonstrates revision 2 level lumbar fusion with extension up to L4- L5, surgical implants appear. well positioned. \par \par AP and Lateral views of the lumbar spine taken 05/17/2022 demonstrates no evidence of adjacent level disease, surgical implants appear well positioned.

## 2022-08-18 NOTE — HISTORY OF PRESENT ILLNESS
[de-identified] : Patient is approximately 9 months status post revision lumbar fusion.  Patient states she is very pleased with her outcome of surgery her back pain is much improved.  She has a short flareup of low back pain she states prior to taking muscle relaxants she was doing home exercises that were doing very very well we have encouraged her to return to home exercises.  No acute neurologic complaints.  She is accompanied by her mother who just arrived from [place] [Improving] : improving [0] : a current pain level of 0/10 [6] : a maximum pain level of 6/10 [Intermit.] : ~He/She~ states the symptoms seem to be intermittent [Bending] : worsened by bending [Lifting] : worsened by lifting [Ataxia] : no ataxia [Incontinence] : no incontinence [Loss of Dexterity] : good dexterity [Urinary Ret.] : no urinary retention [de-identified] : Muscle relaxants

## 2022-08-18 NOTE — PHYSICAL EXAM
[Normal] : Gait: normal [de-identified] : CONSTITUTIONAL: The patient is a very pleasant individual who is well-nourished and who appears stated age.\par PSYCHIATRIC: The patient is alert and oriented X 3 and in no apparent distress, and participates with orthopedic evaluation well.\par HEAD: Atraumatic and is nonsyndromic in appearance.\par EENT: No visible thyromegaly, EOMI.\par RESPIRATORY: Respiratory rate is regular, not dyspneic on examination.\par LYMPHATICS: There is no inguinal lymphadenopathy\par INTEGUMENTARY: Skin is clean, dry, and intact about the bilateral lower extremities and lumbar spine.\par VASCULAR: There is brisk capillary refill about the bilateral lower extremities.\par NEUROLOGIC: There are no pathologic reflexes. There is no decrease in sensation of the bilateral lower extremities on Wartenberg pinwheel examination. Deep tendon reflexes are well maintained at 2+/4 of the bilateral lower extremities and are symmetric..\par MUSCULOSKELETAL: There is no visible muscular atrophy. Manual motor strength is well maintained in the bilateral lower extremities. Range of motion of lumbar spine is well maintained. The patient ambulates in a non-myelopathic manner. Negative tension sign and straight leg raise bilaterally. Quad extension, ankle dorsiflexion, EHL, plantar flexion, and ankle eversion are well preserved. Normal secondary orthopaedic exam of bilateral hips, greater trochanteric area, knees and ankles.  Very mild mechanically orientated low back pain [de-identified] : AP and lateral images of the lumbar spine taken 01/20/2022 demonstrates revision 2 level lumbar fusion with extension up to L4- L5, surgical implants appear. well positioned.   AP and Lateral views of the lumbar spine taken 05/17/2022 demonstrates no evidence of adjacent level disease, surgical implants appear well positioned.

## 2022-08-18 NOTE — DISCUSSION/SUMMARY
[Medication Risks Reviewed] : Medication risks reviewed [de-identified] : We talked about the nature of the condition and treatment options. Anticipatory guidance regarding sciatica was given. I advised light aerobic conditioning and light weight bearing exercises. We discussed modification of gym work outs and ceasing heavy dead lift type exercises. Continue with ADLs as tolerated at this time. Gabapentin 300MG HS was prescribed.  Patient will follow up in three months for repeat clinical assessment. \par \par Prior to appointment and during encounter with patient extensive medical records were reviewed including but not limited to, hospital records, out patient records, imaging results, and lab data. During this appointment the patient was examined, diagnoses were discussed and explained in a face to face manner. In addition extensive time was spent reviewing aforementioned diagnostic studies. Counseling including abnormal image results, differential diagnoses, treatment options, risk and benefits, lifestyle changes, current condition, and current medications was performed. Patient's comments, questions, and concerns were address and patient verbalized understanding. Based on this patient's presentation at our office, which is an orthopedic spine surgeon's office, this patient inherently / intrinsically has a risk, however minute, of developing  issues such as Cauda equina syndrome, bowel and bladder changes, or progression of motor or neurological deficits such as paralysis which may be permanent.

## 2022-08-18 NOTE — HISTORY OF PRESENT ILLNESS
[de-identified] : 52 year old F Presents for follow up evaluation Sp lumbar fusion. She states in  she had sciatica, she was prescribed Gabapentin in [place] and pain imrpoved. She remains pleased with her surgery. She is working out 3 times a week.  [Ataxia] : no ataxia [Incontinence] : no incontinence [Loss of Dexterity] : good dexterity [Urinary Ret.] : no urinary retention

## 2022-08-18 NOTE — DISCUSSION/SUMMARY
[de-identified] : Patient was educated on home exercises Home exercises provided I like this patient to return to doing her home exercises aerobic conditioning core strengthening topical modalities patient is also going to be enrolled in the short course of physical therapy.  Patient is going to follow-up in November for her 1 year follow-up with regard to revision lumbar spinal fusion at that point in time I like to obtain a repeat standing lumbar x-ray

## 2022-08-18 NOTE — ADDENDUM
[FreeTextEntry1] : Documented by Ethan Dewitt acting as a scribe for Dee Horton on 05/17/2022 . All medical record entries made by the Scribe were at my, Dee Horton , direction and personally dictated by me on 05/17/2022 . I have reviewed the chart and agree that the record accurately reflects my personal performance of the history, physical exam, assessment and plan. I have also personally directed, reviewed, and agreed with the chart.

## 2022-08-23 ENCOUNTER — RX RENEWAL (OUTPATIENT)
Age: 52
End: 2022-08-23

## 2022-08-24 ENCOUNTER — APPOINTMENT (OUTPATIENT)
Dept: NEUROLOGY | Facility: CLINIC | Age: 52
End: 2022-08-24

## 2022-10-14 NOTE — H&P PST ADULT - ACTIVITY
Continue Hydrochlorothiazide.    Recent EKG in chart.    Will obtain ECHO results  from cardiologist. ADLs, not very physically active ADLs, some walking, not very physically active

## 2022-11-14 NOTE — H&P PST ADULT - PRIMARY CARE PROVIDER
Dr Bhagat Modified Advancement Flap Text: The defect edges were debeveled with a #15 scalpel blade.  Given the location of the defect, shape of the defect and the proximity to free margins a modified advancement flap was deemed most appropriate.  Using a sterile surgical marker, an appropriate advancement flap was drawn incorporating the defect and placing the expected incisions within the relaxed skin tension lines where possible.    The area thus outlined was incised deep to adipose tissue with a #15 scalpel blade.  The skin margins were undermined to an appropriate distance in all directions utilizing iris scissors.

## 2022-11-22 ENCOUNTER — APPOINTMENT (OUTPATIENT)
Dept: ORTHOPEDIC SURGERY | Facility: CLINIC | Age: 52
End: 2022-11-22

## 2022-11-22 VITALS
HEART RATE: 75 BPM | HEIGHT: 63 IN | SYSTOLIC BLOOD PRESSURE: 131 MMHG | DIASTOLIC BLOOD PRESSURE: 85 MMHG | BODY MASS INDEX: 33.66 KG/M2 | WEIGHT: 190 LBS

## 2022-11-22 DIAGNOSIS — K21.9 GASTRO-ESOPHAGEAL REFLUX DISEASE W/OUT ESOPHAGITIS: ICD-10-CM

## 2022-11-22 PROCEDURE — 72100 X-RAY EXAM L-S SPINE 2/3 VWS: CPT

## 2022-11-22 PROCEDURE — 99213 OFFICE O/P EST LOW 20 MIN: CPT

## 2022-11-22 NOTE — DISCUSSION/SUMMARY
[de-identified] : Conservative treatment was discussed with the patient at length. Anticipatory guidance regarding disease process, avoidance of acute exacerbation this was discussed at length and all patients commenting concerns were answered to the patient's satisfaction. Physical therapy or chiropractic for decrease pain and increase function, to give a home exercise program, was encouraged and should be continued. Intermittent use of acetaminophen 500 mg 2 tablets t.i.d. p.r.n. mild to moderate pain, avoid traditional NSAIDs as they can exacerbate her underlying reflux. Home exercise including stretching on a daily basis for 20-30 minutes was recommended. Heat, ice, topical were discussed as needed. The patient will followup in 3 to 4 months at which point in time we can obtain an x-ray of her cervical spine AP lateral if cervical pain is still continuing.  Continue with trigger point injections, home stretching, chiropractic regarding her neck pain.

## 2022-11-22 NOTE — PHYSICAL EXAM
[de-identified] : CONSTITUTIONAL: The patient is a very pleasant individual who is well-nourished and who appears stated age.\par PSYCHIATRIC: The patient is alert and oriented X 3 and in no apparent distress, and participates with orthopedic evaluation well.\par HEAD: Atraumatic and is nonsyndromic in appearance.\par EENT: No visible thyromegaly, EOMI.\par RESPIRATORY: Respiratory rate is regular, not dyspneic on examination.\par LYMPHATICS: There is no inguinal lymphadenopathy\par INTEGUMENTARY: Skin is clean, dry, and intact about the bilateral lower extremities and lumbar spine.\par VASCULAR: There is brisk capillary refill about the bilateral lower extremities.\par NEUROLOGIC: There are no pathologic reflexes. There is no decrease in sensation of the bilateral lower extremities on Wartenberg pinwheel examination. Deep tendon reflexes are well maintained at 2+/4 of the bilateral lower extremities and are symmetric..\par MUSCULOSKELETAL: There is no visible muscular atrophy. Manual motor strength is well maintained in the bilateral lower extremities. Range of motion of lumbar spine is well maintained. The patient ambulates in a non-myelopathic manner. Negative tension sign and straight leg raise bilaterally. Quad extension, ankle dorsiflexion, EHL, plantar flexion, and ankle eversion are well preserved. Normal secondary orthopaedic exam of bilateral hips, greater trochanteric area, knees and ankles Rodolfo is highlighted by mild low back discomfort, reproducible muscle tenderness. [de-identified] : \par AP and Lateral views of the lumbar spine taken 11/22/2022 demonstrates no evidence of adjacent level disease, surgical implants appear well positioned. \par \par

## 2022-11-22 NOTE — HISTORY OF PRESENT ILLNESS
[de-identified] : 1 year status post revision lumbar fusion.  She intermittently has discomfort across her low back rates 3 out of 10 on the pain scale, when she is sitting at her workstation or when she stands a lot.  Pain is relieved by light stretching.  She would like to know what type of exercises she can and cannot do.  She is having no tingling numbness burning down the legs.  No change in bowel or bladder.  \par She is doing chiropractic for her neck as well as pain management, trigger point injections for neck pain.  DEE questionnaire is negative.

## 2022-12-12 ENCOUNTER — APPOINTMENT (OUTPATIENT)
Dept: PULMONOLOGY | Facility: CLINIC | Age: 52
End: 2022-12-12

## 2022-12-12 DIAGNOSIS — J20.9 ACUTE BRONCHITIS, UNSPECIFIED: ICD-10-CM

## 2022-12-12 DIAGNOSIS — R05.9 COUGH, UNSPECIFIED: ICD-10-CM

## 2022-12-12 DIAGNOSIS — R09.81 NASAL CONGESTION: ICD-10-CM

## 2022-12-12 DIAGNOSIS — U07.1 COVID-19: ICD-10-CM

## 2022-12-12 PROCEDURE — 99442: CPT

## 2022-12-12 RX ORDER — FLUTICASONE PROPIONATE 50 UG/1
50 SPRAY, METERED NASAL DAILY
Qty: 1 | Refills: 1 | Status: ACTIVE | COMMUNITY
Start: 2022-12-12 | End: 1900-01-01

## 2022-12-13 ENCOUNTER — NON-APPOINTMENT (OUTPATIENT)
Age: 52
End: 2022-12-13

## 2022-12-13 DIAGNOSIS — T14.8XXA OTHER INJURY OF UNSPECIFIED BODY REGION, INITIAL ENCOUNTER: ICD-10-CM

## 2022-12-13 NOTE — PLAN
[FreeTextEntry1] : continue FU with Spine Sx and Psych.\par \par continue current meds\par \par Covid booster next week. \par \par fu 3 months for CPE
WRIST FRACTURE - Your results are on the pages to follow. You have a DISTAL RADIUS (wrist) FRACTURE. You were seen by the ORTHOPEDIC doctors in the Emergency Department and you were placed in a splint.     Follow up with ORTHOPEDICS within one week. Call today/tomorrow for an appointment as soon as possible.     Return to the Emergency Department for persistent, worsening, or new symptoms including change in coloration of fingers (pale or blue), numbness/tingling of fingers, extreme pain with movement of fingers, fever > 102, or any other serious concerns.    SPLINT - Keep splint in place. Keep clean and dry. Keep arm elevated as much as possible to decrease swelling. Return to the Emergency Department for persistent, worsening, or new symptoms including numbness/tingling of fingers, change in coloration of fingers, severe pain of the affected area, severe pain when moving your fingers on your hand in the splint, or any other serious concerns.    ___________      PAIN MEDICATIONS -     TYLENOL / MOTRIN -    For symptoms take Motrin and tylenol - THESE MEDICATIONS DO NOT REQUIRE A PRESCRIPTION AND CAN BE PURCHASED IN ANY PHARMACY. Take motrin 600mg every 6 hours as needed, take with food - discontinue use if you notice abdominal pain, blood in stool or black stools. AND / OR... Take tylenol as needed - 650mg every 6 hours as needed, do not exceed 4000mg in 24 hours. You can take one medication or the other. Or, if one medication alone does not seem to be working, you can actually take both tylenol and motrin together and that is acceptable / safe but you should alternate the medications so you are taking something every few hours; consider the following regimen - Take one medication every 3 hours. 7am motrin with breakfast, 10am tylenol, 1pm motirn with lunch, 4pm tylenol, 7pm motrin with dinner, 10pm tylneol before bed.

## 2023-01-13 ENCOUNTER — APPOINTMENT (OUTPATIENT)
Dept: INTERNAL MEDICINE | Facility: CLINIC | Age: 53
End: 2023-01-13
Payer: MEDICARE

## 2023-01-13 VITALS
BODY MASS INDEX: 33.13 KG/M2 | DIASTOLIC BLOOD PRESSURE: 70 MMHG | TEMPERATURE: 98.5 F | OXYGEN SATURATION: 96 % | SYSTOLIC BLOOD PRESSURE: 110 MMHG | HEART RATE: 78 BPM | HEIGHT: 63 IN | WEIGHT: 187 LBS

## 2023-01-13 DIAGNOSIS — R10.9 UNSPECIFIED ABDOMINAL PAIN: ICD-10-CM

## 2023-01-13 DIAGNOSIS — L29.9 PRURITUS, UNSPECIFIED: ICD-10-CM

## 2023-01-13 DIAGNOSIS — E78.5 HYPERLIPIDEMIA, UNSPECIFIED: ICD-10-CM

## 2023-01-13 LAB
BILIRUB UR QL STRIP: NEGATIVE
CLARITY UR: NORMAL
COLLECTION METHOD: NORMAL
GLUCOSE UR-MCNC: NEGATIVE
HCG UR QL: 0.2 EU/DL
HGB UR QL STRIP.AUTO: NEGATIVE
KETONES UR-MCNC: NEGATIVE
LEUKOCYTE ESTERASE UR QL STRIP: NEGATIVE
NITRITE UR QL STRIP: NEGATIVE
PH UR STRIP: 8.5
PROT UR STRIP-MCNC: NORMAL
SP GR UR STRIP: 1.02

## 2023-01-13 PROCEDURE — 81003 URINALYSIS AUTO W/O SCOPE: CPT | Mod: QW

## 2023-01-13 PROCEDURE — 99214 OFFICE O/P EST MOD 30 MIN: CPT

## 2023-01-13 RX ORDER — BENZONATATE 100 MG/1
100 CAPSULE ORAL 3 TIMES DAILY
Qty: 1 | Refills: 0 | Status: DISCONTINUED | COMMUNITY
Start: 2022-12-12 | End: 2023-01-13

## 2023-01-13 RX ORDER — AZITHROMYCIN 250 MG/1
250 TABLET, FILM COATED ORAL
Qty: 1 | Refills: 0 | Status: DISCONTINUED | COMMUNITY
Start: 2022-12-12 | End: 2023-01-13

## 2023-01-13 RX ORDER — SILVER SULFADIAZINE 10 MG/G
1 CREAM TOPICAL TWICE DAILY
Qty: 45 | Refills: 0 | Status: DISCONTINUED | COMMUNITY
Start: 2022-12-13 | End: 2023-01-13

## 2023-01-13 RX ORDER — METHYLPREDNISOLONE 4 MG/1
4 TABLET ORAL
Qty: 1 | Refills: 0 | Status: DISCONTINUED | COMMUNITY
Start: 2022-12-12 | End: 2023-01-13

## 2023-01-13 RX ORDER — UBIDECARENONE 30 MG
CAPSULE ORAL
Refills: 0 | Status: DISCONTINUED | COMMUNITY
End: 2023-01-13

## 2023-01-13 NOTE — HISTORY OF PRESENT ILLNESS
[FreeTextEntry1] : F/U [de-identified] : Patient is a 52- year- old female with extensive PMH who presents to office today for evaluation of R sided back/ flank pain x3 months. The patients reports pain radiates into RUQ of her abdomen. She denies N/V/D. She denies injury or trauma. She has been taking Robaxin with no improvement. \par \par The patient is also complaining of rash on upper torso. She reports she is extremely itchy. She has taken Benadryl and Claritin with no improvement. Possibly triggered by new detergent. Also recently s/p COVID\par \par Patient has no other complaints today.

## 2023-01-13 NOTE — PLAN
[FreeTextEntry1] : 1. Full labs drawn now- will follow results- of note patient not fasting, lipid may be elevated, patient aware\par \par 2. Will send patient for abdominal us to further assess RUQ pain\par \par 3. Patient will trial medrol pack for rash/pruritus. Meloxicam daily x 10 days for LBP\par \par 4. To avoid heavy lifting/straining for now\par \par 5. F/U 6 MO

## 2023-01-13 NOTE — PHYSICAL EXAM
[No Acute Distress] : no acute distress [Normal Oropharynx] : the oropharynx was normal [Normal TMs] : both tympanic membranes were normal [No Respiratory Distress] : no respiratory distress  [No Accessory Muscle Use] : no accessory muscle use [Clear to Auscultation] : lungs were clear to auscultation bilaterally [Normal Rate] : normal rate  [Regular Rhythm] : with a regular rhythm [Normal S1, S2] : normal S1 and S2 [No Edema] : there was no peripheral edema [Soft] : abdomen soft [Non-distended] : non-distended [Normal Bowel Sounds] : normal bowel sounds [Normal Gait] : normal gait [Normal Affect] : the affect was normal [Alert and Oriented x3] : oriented to person, place, and time [de-identified] : Goiter [de-identified] : RUQ tenderness on palpation [de-identified] : +CVA tenderness, reproducible pain on palpation B/L lower back

## 2023-01-19 ENCOUNTER — NON-APPOINTMENT (OUTPATIENT)
Age: 53
End: 2023-01-19

## 2023-01-19 LAB
25(OH)D3 SERPL-MCNC: 26.6 NG/ML
ALBUMIN SERPL ELPH-MCNC: 4.8 G/DL
ALP BLD-CCNC: 136 U/L
ALT SERPL-CCNC: 17 U/L
AMYLASE/CREAT SERPL: 51 U/L
ANA PAT FLD IF-IMP: ABNORMAL
ANA SER IF-ACNC: ABNORMAL
ANION GAP SERPL CALC-SCNC: 13 MMOL/L
APPEARANCE: CLEAR
AST SERPL-CCNC: 19 U/L
BASOPHILS # BLD AUTO: 0.03 K/UL
BASOPHILS NFR BLD AUTO: 0.4 %
BILIRUB SERPL-MCNC: 0.2 MG/DL
BILIRUBIN URINE: NEGATIVE
BLOOD URINE: NEGATIVE
BUN SERPL-MCNC: 12 MG/DL
C TRACH RRNA SPEC QL NAA+PROBE: NOT DETECTED
CALCIUM SERPL-MCNC: 9.9 MG/DL
CHLORIDE SERPL-SCNC: 103 MMOL/L
CHOLEST SERPL-MCNC: 170 MG/DL
CO2 SERPL-SCNC: 26 MMOL/L
COLOR: YELLOW
CREAT SERPL-MCNC: 0.57 MG/DL
EGFR: 108 ML/MIN/1.73M2
EOSINOPHIL # BLD AUTO: 0.13 K/UL
EOSINOPHIL NFR BLD AUTO: 1.7 %
ESTIMATED AVERAGE GLUCOSE: 105 MG/DL
FOLATE SERPL-MCNC: 19.8 NG/ML
GLUCOSE QUALITATIVE U: NEGATIVE
GLUCOSE SERPL-MCNC: 105 MG/DL
HAV IGM SER QL: NONREACTIVE
HBA1C MFR BLD HPLC: 5.3 %
HBV CORE IGM SER QL: NONREACTIVE
HBV SURFACE AG SER QL: NONREACTIVE
HCT VFR BLD CALC: 42.6 %
HCV AB SER QL: NONREACTIVE
HCV S/CO RATIO: 0.06 S/CO
HDLC SERPL-MCNC: 58 MG/DL
HGB BLD-MCNC: 14.1 G/DL
HIV1+2 AB SPEC QL IA.RAPID: NONREACTIVE
IMM GRANULOCYTES NFR BLD AUTO: 0.1 %
IRON SATN MFR SERPL: 30 %
IRON SERPL-MCNC: 96 UG/DL
KETONES URINE: NEGATIVE
LDLC SERPL CALC-MCNC: 55 MG/DL
LEUKOCYTE ESTERASE URINE: NEGATIVE
LPL SERPL-CCNC: 37 U/L
LYMPHOCYTES # BLD AUTO: 2.58 K/UL
LYMPHOCYTES NFR BLD AUTO: 33.2 %
MAN DIFF?: NORMAL
MCHC RBC-ENTMCNC: 32.9 PG
MCHC RBC-ENTMCNC: 33.1 GM/DL
MCV RBC AUTO: 99.5 FL
MONOCYTES # BLD AUTO: 0.63 K/UL
MONOCYTES NFR BLD AUTO: 8.1 %
N GONORRHOEA RRNA SPEC QL NAA+PROBE: NOT DETECTED
NEUTROPHILS # BLD AUTO: 4.38 K/UL
NEUTROPHILS NFR BLD AUTO: 56.5 %
NITRITE URINE: NEGATIVE
NONHDLC SERPL-MCNC: 112 MG/DL
PH URINE: 8.5
PLATELET # BLD AUTO: 278 K/UL
POTASSIUM SERPL-SCNC: 4.5 MMOL/L
PROT SERPL-MCNC: 7.1 G/DL
PROTEIN URINE: NORMAL
RBC # BLD: 4.28 M/UL
RBC # FLD: 12.1 %
SODIUM SERPL-SCNC: 142 MMOL/L
SOURCE AMPLIFICATION: NORMAL
SPECIFIC GRAVITY URINE: 1.02
T PALLIDUM AB SER QL IA: NEGATIVE
T3FREE SERPL-MCNC: 2.9 PG/ML
T4 FREE SERPL-MCNC: 0.8 NG/DL
TIBC SERPL-MCNC: 319 UG/DL
TRIGL SERPL-MCNC: 289 MG/DL
TSH SERPL-ACNC: 1.46 UIU/ML
UIBC SERPL-MCNC: 224 UG/DL
UROBILINOGEN URINE: NORMAL
VIT B12 SERPL-MCNC: 562 PG/ML
WBC # FLD AUTO: 7.76 K/UL

## 2023-01-20 ENCOUNTER — NON-APPOINTMENT (OUTPATIENT)
Age: 53
End: 2023-01-20

## 2023-02-09 ENCOUNTER — APPOINTMENT (OUTPATIENT)
Dept: INTERNAL MEDICINE | Facility: CLINIC | Age: 53
End: 2023-02-09
Payer: MEDICARE

## 2023-02-09 VITALS
DIASTOLIC BLOOD PRESSURE: 78 MMHG | WEIGHT: 187 LBS | OXYGEN SATURATION: 95 % | TEMPERATURE: 97.8 F | SYSTOLIC BLOOD PRESSURE: 110 MMHG | HEART RATE: 70 BPM | BODY MASS INDEX: 33.13 KG/M2 | HEIGHT: 63 IN

## 2023-02-09 DIAGNOSIS — R41.3 OTHER AMNESIA: ICD-10-CM

## 2023-02-09 DIAGNOSIS — R25.1 TREMOR, UNSPECIFIED: ICD-10-CM

## 2023-02-09 PROCEDURE — 99214 OFFICE O/P EST MOD 30 MIN: CPT

## 2023-02-09 RX ORDER — METHYLPREDNISOLONE 4 MG/1
4 TABLET ORAL
Qty: 1 | Refills: 0 | Status: DISCONTINUED | COMMUNITY
Start: 2023-01-13 | End: 2023-02-09

## 2023-02-09 NOTE — PHYSICAL EXAM
[No Acute Distress] : no acute distress [Normal Oropharynx] : the oropharynx was normal [Normal TMs] : both tympanic membranes were normal [No Respiratory Distress] : no respiratory distress  [No Accessory Muscle Use] : no accessory muscle use [Clear to Auscultation] : lungs were clear to auscultation bilaterally [Normal Rate] : normal rate  [Regular Rhythm] : with a regular rhythm [Normal S1, S2] : normal S1 and S2 [No Edema] : there was no peripheral edema [Soft] : abdomen soft [Non-distended] : non-distended [Normal Bowel Sounds] : normal bowel sounds [Normal Gait] : normal gait [Normal Affect] : the affect was normal [Alert and Oriented x3] : oriented to person, place, and time [Coordination Grossly Intact] : coordination grossly intact [No Focal Deficits] : no focal deficits [de-identified] : Goiter [de-identified] : RUQ tenderness on palpation [de-identified] : +CVA tenderness, reproducible pain on palpation B/L lower back [de-identified] : Diminished strength B/L upper extremity

## 2023-02-09 NOTE — PLAN
[FreeTextEntry1] : 1. The patient was advised to follow up with Dr. León to further evaluate her symptoms above. She was advised to go to ER immediately if she should experience s/s of stroke\par \par 2. F/U with GI for abdominal symptoms\par \par 3. BW reviewed with patient- vit d 2000iu daily, low fat low chol diet advised\par \par 4. F/U in 3 months or sooner if needed

## 2023-02-09 NOTE — HISTORY OF PRESENT ILLNESS
[FreeTextEntry1] : F/U [de-identified] : Patient is a 52- year- old female with extensive PMH who presents to office today for evaluation of several neurological complaints.\par \par The patient states that over the past 3 years, she has been experiencing tremors that have worsened over the past several weeks. The patient states they have gotten to the point where she is unable to move her computer mouse without stabilizing hand with other hand. She also states she finds herself dropping objects out of her hand while doing activities such as dish washing.  She reports numbness and tinging in her hands daily.\par \par The patient states she has been having difficulty with her speech- she is unable to articulate words. She finds she is also forgetful and unable to recall where she is going while driving and what she did the day before. \par \par Fmhx significant for maternal aunt with dementia, 1st cousin with lupus. MRI head from Dr. León in 2022 negative.\par \par Patient currently being worked up for R sided flank pain by GI, still reports she is in pain. \par \par No acute findings on exam today.

## 2023-02-13 RX ORDER — GABAPENTIN 600 MG/1
600 TABLET, COATED ORAL
Qty: 90 | Refills: 1 | Status: ACTIVE | COMMUNITY
Start: 1900-01-01 | End: 1900-01-01

## 2023-02-17 ENCOUNTER — APPOINTMENT (OUTPATIENT)
Dept: ORTHOPEDIC SURGERY | Facility: CLINIC | Age: 53
End: 2023-02-17
Payer: MEDICARE

## 2023-02-17 VITALS
HEIGHT: 63 IN | WEIGHT: 187 LBS | DIASTOLIC BLOOD PRESSURE: 81 MMHG | SYSTOLIC BLOOD PRESSURE: 124 MMHG | HEART RATE: 85 BPM | BODY MASS INDEX: 33.13 KG/M2

## 2023-02-17 DIAGNOSIS — M51.36 OTHER INTERVERTEBRAL DISC DEGENERATION, LUMBAR REGION: ICD-10-CM

## 2023-02-17 DIAGNOSIS — Z98.1 ARTHRODESIS STATUS: ICD-10-CM

## 2023-02-17 PROCEDURE — 99215 OFFICE O/P EST HI 40 MIN: CPT

## 2023-02-17 PROCEDURE — 72040 X-RAY EXAM NECK SPINE 2-3 VW: CPT

## 2023-02-17 PROCEDURE — 72100 X-RAY EXAM L-S SPINE 2/3 VWS: CPT

## 2023-03-01 ENCOUNTER — APPOINTMENT (OUTPATIENT)
Dept: NEUROLOGY | Facility: CLINIC | Age: 53
End: 2023-03-01

## 2023-03-06 ENCOUNTER — RX RENEWAL (OUTPATIENT)
Age: 53
End: 2023-03-06

## 2023-03-07 ENCOUNTER — OUTPATIENT (OUTPATIENT)
Dept: OUTPATIENT SERVICES | Facility: HOSPITAL | Age: 53
LOS: 1 days | End: 2023-03-07
Payer: MEDICARE

## 2023-03-07 VITALS
DIASTOLIC BLOOD PRESSURE: 70 MMHG | TEMPERATURE: 97 F | HEART RATE: 70 BPM | HEIGHT: 63 IN | SYSTOLIC BLOOD PRESSURE: 120 MMHG | RESPIRATION RATE: 16 BRPM | OXYGEN SATURATION: 96 % | WEIGHT: 185.19 LBS

## 2023-03-07 DIAGNOSIS — Z01.818 ENCOUNTER FOR OTHER PREPROCEDURAL EXAMINATION: ICD-10-CM

## 2023-03-07 DIAGNOSIS — Z90.711 ACQUIRED ABSENCE OF UTERUS WITH REMAINING CERVICAL STUMP: Chronic | ICD-10-CM

## 2023-03-07 DIAGNOSIS — Z85.89 PERSONAL HISTORY OF MALIGNANT NEOPLASM OF OTHER ORGANS AND SYSTEMS: Chronic | ICD-10-CM

## 2023-03-07 DIAGNOSIS — Z29.9 ENCOUNTER FOR PROPHYLACTIC MEASURES, UNSPECIFIED: ICD-10-CM

## 2023-03-07 DIAGNOSIS — I10 ESSENTIAL (PRIMARY) HYPERTENSION: ICD-10-CM

## 2023-03-07 DIAGNOSIS — M47.812 SPONDYLOSIS WITHOUT MYELOPATHY OR RADICULOPATHY, CERVICAL REGION: ICD-10-CM

## 2023-03-07 DIAGNOSIS — Z13.89 ENCOUNTER FOR SCREENING FOR OTHER DISORDER: ICD-10-CM

## 2023-03-07 DIAGNOSIS — Z98.1 ARTHRODESIS STATUS: Chronic | ICD-10-CM

## 2023-03-07 LAB
A1C WITH ESTIMATED AVERAGE GLUCOSE RESULT: 5.4 % — SIGNIFICANT CHANGE UP (ref 4–5.6)
ANION GAP SERPL CALC-SCNC: 12 MMOL/L — SIGNIFICANT CHANGE UP (ref 5–17)
APTT BLD: 28.6 SEC — SIGNIFICANT CHANGE UP (ref 27.5–35.5)
BASOPHILS # BLD AUTO: 0.04 K/UL — SIGNIFICANT CHANGE UP (ref 0–0.2)
BASOPHILS NFR BLD AUTO: 0.5 % — SIGNIFICANT CHANGE UP (ref 0–2)
BLD GP AB SCN SERPL QL: SIGNIFICANT CHANGE UP
BUN SERPL-MCNC: 12.9 MG/DL — SIGNIFICANT CHANGE UP (ref 8–20)
CALCIUM SERPL-MCNC: 9.4 MG/DL — SIGNIFICANT CHANGE UP (ref 8.4–10.5)
CHLORIDE SERPL-SCNC: 101 MMOL/L — SIGNIFICANT CHANGE UP (ref 96–108)
CO2 SERPL-SCNC: 28 MMOL/L — SIGNIFICANT CHANGE UP (ref 22–29)
CREAT SERPL-MCNC: 0.59 MG/DL — SIGNIFICANT CHANGE UP (ref 0.5–1.3)
EGFR: 106 ML/MIN/1.73M2 — SIGNIFICANT CHANGE UP
EOSINOPHIL # BLD AUTO: 0.09 K/UL — SIGNIFICANT CHANGE UP (ref 0–0.5)
EOSINOPHIL NFR BLD AUTO: 1.2 % — SIGNIFICANT CHANGE UP (ref 0–6)
ESTIMATED AVERAGE GLUCOSE: 108 MG/DL — SIGNIFICANT CHANGE UP (ref 68–114)
GLUCOSE SERPL-MCNC: 108 MG/DL — HIGH (ref 70–99)
HCT VFR BLD CALC: 38 % — SIGNIFICANT CHANGE UP (ref 34.5–45)
HGB BLD-MCNC: 13.5 G/DL — SIGNIFICANT CHANGE UP (ref 11.5–15.5)
IMM GRANULOCYTES NFR BLD AUTO: 0.1 % — SIGNIFICANT CHANGE UP (ref 0–0.9)
INR BLD: 1.05 RATIO — SIGNIFICANT CHANGE UP (ref 0.88–1.16)
LYMPHOCYTES # BLD AUTO: 2.67 K/UL — SIGNIFICANT CHANGE UP (ref 1–3.3)
LYMPHOCYTES # BLD AUTO: 36.7 % — SIGNIFICANT CHANGE UP (ref 13–44)
MCHC RBC-ENTMCNC: 33.3 PG — SIGNIFICANT CHANGE UP (ref 27–34)
MCHC RBC-ENTMCNC: 35.5 GM/DL — SIGNIFICANT CHANGE UP (ref 32–36)
MCV RBC AUTO: 93.6 FL — SIGNIFICANT CHANGE UP (ref 80–100)
MONOCYTES # BLD AUTO: 0.52 K/UL — SIGNIFICANT CHANGE UP (ref 0–0.9)
MONOCYTES NFR BLD AUTO: 7.1 % — SIGNIFICANT CHANGE UP (ref 2–14)
MRSA PCR RESULT.: SIGNIFICANT CHANGE UP
NEUTROPHILS # BLD AUTO: 3.95 K/UL — SIGNIFICANT CHANGE UP (ref 1.8–7.4)
NEUTROPHILS NFR BLD AUTO: 54.4 % — SIGNIFICANT CHANGE UP (ref 43–77)
PLATELET # BLD AUTO: 242 K/UL — SIGNIFICANT CHANGE UP (ref 150–400)
POTASSIUM SERPL-MCNC: 4.1 MMOL/L — SIGNIFICANT CHANGE UP (ref 3.5–5.3)
POTASSIUM SERPL-SCNC: 4.1 MMOL/L — SIGNIFICANT CHANGE UP (ref 3.5–5.3)
PROTHROM AB SERPL-ACNC: 12.2 SEC — SIGNIFICANT CHANGE UP (ref 10.5–13.4)
RBC # BLD: 4.06 M/UL — SIGNIFICANT CHANGE UP (ref 3.8–5.2)
RBC # FLD: 11.3 % — SIGNIFICANT CHANGE UP (ref 10.3–14.5)
S AUREUS DNA NOSE QL NAA+PROBE: SIGNIFICANT CHANGE UP
SODIUM SERPL-SCNC: 141 MMOL/L — SIGNIFICANT CHANGE UP (ref 135–145)
WBC # BLD: 7.28 K/UL — SIGNIFICANT CHANGE UP (ref 3.8–10.5)
WBC # FLD AUTO: 7.28 K/UL — SIGNIFICANT CHANGE UP (ref 3.8–10.5)

## 2023-03-07 PROCEDURE — 93010 ELECTROCARDIOGRAM REPORT: CPT

## 2023-03-07 PROCEDURE — 93005 ELECTROCARDIOGRAM TRACING: CPT

## 2023-03-07 PROCEDURE — G0463: CPT

## 2023-03-07 RX ORDER — PANTOPRAZOLE SODIUM 20 MG/1
1 TABLET, DELAYED RELEASE ORAL
Qty: 0 | Refills: 0 | DISCHARGE

## 2023-03-07 RX ORDER — NEBIVOLOL HYDROCHLORIDE 5 MG/1
1 TABLET ORAL
Qty: 0 | Refills: 0 | DISCHARGE

## 2023-03-07 RX ORDER — SODIUM CHLORIDE 9 MG/ML
3 INJECTION INTRAMUSCULAR; INTRAVENOUS; SUBCUTANEOUS EVERY 8 HOURS
Refills: 0 | Status: DISCONTINUED | OUTPATIENT
Start: 2023-03-27 | End: 2023-03-28

## 2023-03-07 RX ORDER — LINACLOTIDE 145 UG/1
1 CAPSULE, GELATIN COATED ORAL
Qty: 0 | Refills: 0 | DISCHARGE

## 2023-03-07 NOTE — H&P PST ADULT - PROBLEM SELECTOR PLAN 3
Caprini - 4 moderate risk   Surgical team please assess/recommend mechanical/pharmacological measures for VTE prophylaxis

## 2023-03-07 NOTE — H&P PST ADULT - NSICDXFAMILYHX_GEN_ALL_CORE_FT
FAMILY HISTORY:  Mother  Still living? Unknown  Family history of osteoarthritis, Age at diagnosis: Age Unknown  FH: diabetes mellitus, Age at diagnosis: Age Unknown    Grandparent  Still living? No  Family history of osteoarthritis, Age at diagnosis: Age Unknown  FH: ovarian cancer, Age at diagnosis: Age Unknown    Aunt  Still living? No  FH: breast cancer, Age at diagnosis: Age Unknown    Uncle  Still living? No  FH: colon cancer, Age at diagnosis: Age Unknown

## 2023-03-07 NOTE — H&P PST ADULT - ASSESSMENT
52 yo with PMH HLD,pre DM, celiac disease, HTN, anxiety and depression with spondylosis scheduled for anterior cervical discectomy fusion C4-C5, C5-C6, C6-C& on 3/27/2023.     -Medical evaluation pending  - Patient educated on ERP protocol (written/verbal)- verbalized understanding  -Educated on NSAIDS, multivitamins and herbals that increase the risk of bleeding and need to be stopped 5 days before procedure  -Educated on infection prevention  -Covid testing 3-5 days prior to surgery   -Tylenol can be taken 5 days before surgery if needed for pain  -Will continue all other medications as prescribed  -Verbalized understanding of all instructions.    OPIOID RISK TOOL    DAVID EACH BOX THAT APPLIES AND ADD TOTALS AT THE END    FAMILY HISTORY OF SUBSTANCE ABUSE                 FEMALE         MALE                                                Alcohol                             [  ]1 pt          [  ]3pts                                               Illegal Durgs                     [  ]2 pts        [  ]3pts                                               Rx Drugs                           [  ]4 pts        [  ]4 pts    PERSONAL HISTORY OF SUBSTANCE ABUSE                                                                                          Alcohol                             [  ]3 pts       [  ]3 pts                                               Illegal Drugs                     [  ]4 pts        [  ]4 pts                                               Rx Drugs                           [  ]5 pts        [  ]5 pts    AGE BETWEEN 16-45 YEARS                                      [  ]1 pt         [  ]1 pt    HISTORY OF PREADOLESCENT   SEXUAL ABUSE                                                             [  ]3 pts        [  ]0pts    PSYCHOLOGICAL DISEASE                     ADD, OCD, Bipolar, Schizophrenia        [  ]2 pts         [  ]2 pts                      Depression                                               [  x]1 pt           [  ]1 pt           SCORING TOTAL   (add numbers and type here)              (1)                                     A score of 3 or lower indicated LOW risk for future opioid abuse  A score of 4 to 7 indicated moderate risk for future opioid abuse  A score of 8 or higher indicates a high risk for opioid abuse    CAPRINI SCORE [CLOT]    AGE RELATED RISK FACTORS                                                       MOBILITY RELATED FACTORS  [ x] Age 41-60 years                                            (1 Point)                  [ ] Bed rest                                                        (1 Point)  [ ] Age: 61-74 years                                           (2 Points)                 [ ] Plaster cast                                                   (2 Points)  [ ] Age= 75 years                                              (3 Points)                 [ ] Bed bound for more than 72 hours                 (2 Points)    DISEASE RELATED RISK FACTORS                                               GENDER SPECIFIC FACTORS  [ ] Edema in the lower extremities                       (1 Point)                  [ ] Pregnancy                                                     (1 Point)  [ ] Varicose veins                                               (1 Point)                  [ ] Post-partum < 6 weeks                                   (1 Point)             [x ] BMI > 25 Kg/m2                                            (1 Point)                  [ ] Hormonal therapy  or oral contraception          (1 Point)                 [ ] Sepsis (in the previous month)                        (1 Point)                  [ ] History of pregnancy complications                 (1 point)  [ ] Pneumonia or serious lung disease                                               [ ] Unexplained or recurrent                     (1 Point)           (in the previous month)                               (1 Point)  [ ] Abnormal pulmonary function test                     (1 Point)                 SURGERY RELATED RISK FACTORS  [ ] Acute myocardial infarction                              (1 Point)                 [ ]  Section                                             (1 Point)  [ ] Congestive heart failure (in the previous month)  (1 Point)               [ ] Minor surgery                                                  (1 Point)   [ ] Inflammatory bowel disease                             (1 Point)                 [ ] Arthroscopic surgery                                        (2 Points)  [ ] Central venous access                                      (2 Points)                [ x] General surgery lasting more than 45 minutes   (2 Points)       [ ] Stroke (in the previous month)                          (5 Points)               [ ] Elective arthroplasty                                         (5 Points)                                                                                                                                               HEMATOLOGY RELATED FACTORS                                                 TRAUMA RELATED RISK FACTORS  [ ] Prior episodes of VTE                                     (3 Points)                [ ] Fracture of the hip, pelvis, or leg                       (5 Points)  [ ] Positive family history for VTE                         (3 Points)                 [ ] Acute spinal cord injury (in the previous month)  (5 Points)  [ ] Prothrombin 08967 A                                     (3 Points)                 [ ] Paralysis  (less than 1 month)                             (5 Points)  [ ] Factor V Leiden                                             (3 Points)                  [ ] Multiple Trauma within 1 month                        (5 Points)  [ ] Lupus anticoagulants                                     (3 Points)                                                           [ ] Anticardiolipin antibodies                               (3 Points)                                                       [ ] High homocysteine in the blood                      (3 Points)                                             [ ] Other congenital or acquired thrombophilia      (3 Points)                                                [ ] Heparin induced thrombocytopenia                  (3 Points)                                          Total Score [     4     ]    Caprini Score 0 - 2:  Low Risk, No VTE Prophylaxis required for most patients, encourage ambulation  Caprini Score 3 - 6:  At Risk, pharmacologic VTE prophylaxis is indicated for most patients (in the absence of a contraindication)  Caprini Score Greater than or = 7:  High Risk, pharmacologic VTE prophylaxis is indicated for most patients (in the absence of a contraindication)

## 2023-03-07 NOTE — H&P PST ADULT - MUSCULOSKELETAL
details… no joint swelling/no joint erythema/normal gait/strength 5/5 bilateral upper extremities/strength 5/5 bilateral lower extremities

## 2023-03-07 NOTE — H&P PST ADULT - NS HP PST ANES REACTION OTHER FT
Reports that mother had delayed awakening twice after anesthesia. Pt received anesthesia without any problems.

## 2023-03-07 NOTE — H&P PST ADULT - PROBLEM SELECTOR PLAN 1
52 yo with PMH HLD,pre DM, celiac disease, HTN, anxiety and depression with spondylosis scheduled for anterior cervical discectomy fusion C4-C5, C5-C6, C6-C& on 3/27/2023.     -Medical evaluation pending  - Patient educated on ERP protocol (written/verbal)- verbalized understanding  -Educated on NSAIDS, multivitamins and herbals that increase the risk of bleeding and need to be stopped 5 days before procedure  -Educated on infection prevention  -Covid testing 3-5 days prior to surgery   -Tylenol can be taken 5 days before surgery if needed for pain  -Will continue all other medications as prescribed  -Verbalized understanding of all instructions.

## 2023-03-07 NOTE — H&P PST ADULT - HISTORY OF PRESENT ILLNESS
History of Present Illness   Kelly is a very pleasant woman well-known to us with regard to lumbar spondylosis she is status post two-level lumbar fusion 4 5 and 5 1 backless recent lumbar revision was 2021 her primary complaint at this point is cervical pain cervical spondylosis with a known diagnosis. She has been under physical therapy chiropractic pain management her pain management provider is Dr. Raza and Yanni she is also noticing radiculopathy and dorsum of her hands. DEE questionnaire thankfully is negative     She states the symptoms are worsening. The patient mentions the symptoms started 2+/- year(s) ago. Pain levels include a maximum pain level of 10/10.   She states the symptoms seem to be constant.     Modifying factors - worsened by bending and worsened by lifting. Relieving factors include relieved by rest.   Associated Symptoms: no ataxia, no incontinence, good dexterity and no urinary retention. 52 yo with PMH HLD, DM II, pre DM, celiac disease, HTN, anxiety and depression presents to PST today. Pt. reports pain to posterior neck radiating to left shoulder for about 2 years. Pain is becoming progressively worse. Described pain as throbbing. Reports completed cortisone injections previously with the last one 2022 with no relief. Pt. reports pain is constant. reports numbness and tingling/weakness to LUE worse when picking up objects. Sitting down with neck bending down have been exacerbating pain. Elevating the neck alleviate pain. Pain levels include a current pain level of 9/10, a minimum pain level of 3/10 and a maximum pain level of 10/10. She has been under physical therapy, chiropractic, pain management without relief. Scheduled for     Kelly is a very pleasant woman well-known to us with regard to lumbar spondylosis she is status post two-level lumbar fusion 4 5 and 5 1 backless recent lumbar revision was 2021 her primary complaint at this point is cervical pain cervical spondylosis with a known diagnosis. She has been under physical therapy chiropractic pain management her pain management provider is Dr. Plaza she is also noticing radiculopathy and dorsum of her hands. DEE questionnaire thankfully is negative     She states the symptoms are worsening. The patient mentions the symptoms started 2+/- year(s) ago. Pain levels include a maximum pain level of 10/10.   She states the symptoms seem to be constant.     Modifying factors - worsened by bending and worsened by lifting. Relieving factors include relieved by rest.   Associated Symptoms: no ataxia, no incontinence, good dexterity and no urinary retention. 54 yo with PMH HLD,pre DM, celiac disease, HTN, anxiety and depression presents to PST today. Pt. reports pain to posterior neck radiating to left shoulder for about 2 years. Pain is becoming progressively worse. Described pain as throbbing. Reports completed cortisone injections previously with the last one 2022 with no relief. Pt. reports pain is constant. Reports numbness and tingling/weakness to LUE worse when picking up objects. Sitting down with neck bending down have been exacerbating pain. Elevating the neck alleviate pain. Pain levels include a current pain level of 9/10, a minimum pain level of 3/10 and a maximum pain level of 10/10. She has been under physical therapy, chiropractic, pain management without relief. Scheduled for anterior cervical discectomy fusion C4-C5, C5-C6, C6-C& on 3/27/2023.

## 2023-03-07 NOTE — H&P PST ADULT - NSICDXPASTMEDICALHX_GEN_ALL_CORE_FT
PAST MEDICAL HISTORY:  At risk for sleep apnea     Depression     GERD (gastroesophageal reflux disease)     History of celiac disease     Hyperlipidemia     Hypertension     Lumbar herniated disc     Lumbar radiculopathy     PTSD (post-traumatic stress disorder)

## 2023-03-10 NOTE — PHYSICAL EXAM
[Antalgic] : antalgic [de-identified] : CONSTITUTIONAL:  Patient is a very pleasant individual who is well-nourished and appears stated age. \par PSYCHIATRIC:  Alert and oriented times three and in no apparent distress, and participates with orthopedic evaluation well.\par HEAD:  Atraumatic and  nonsyndromic in appearance.\par EENT: No thyromegaly, EOMI.\par RESPIRATORY:  Respiratory rate is regular, not dyspneic on examination.\par LYMPHATICS:  There is no cervical or axillary lymphadenopathy.\par INTEGUMENTARY:  Skin is clean, dry, and intact about the bilateral upper extremities and cervical spine. \par VASCULAR:   There is brisk capillary refill about the bilateral upper extremities and radial pulses are 2/4. \par NEUROLOGIC: Positive L'hirmitte, positive Spurling's sign. There are no pathologic reflexes. There is a decrease in sensation of the bilateral upper extremities on Wartenberg pinwheel examination and light touch consistent with a C5 and C6 distribution.  Deep tendon reflexes are well-maintained at +2/4 of the bilateral upper extremities and are symmetric.\par MUSCULOSKELETAL:  There is no visible muscular atrophy.  Manual motor strength is well maintained in the bilateral upper extremities 3+  4-/5 secondary to pain and apprehension.  Cervical range of motion is well maintained to approximately 50% bilaterally.  The patient ambulates in a non-myelopathic manner. Normal secondary orthopaedic exam of bilateral shoulders, elbows and hands.  Elbow flexion and extension, wrist extension, finger flexion and abduction are well maintained.  Posterior cervical myositis.\par \par   [de-identified] : X-rays of the cervical spine have been reviewed on today's date 2 views of the cervical spine there is age-appropriate loss of cervical lordosis there is focal cervical spondylosis at C5-C6.\par \par 2 views of the lumbar spine have been reviewed that shows an L4-5 L5-S1 spinal fusion.  The interbody's are in different mechanical implants at L5-S1 is a unilateral peek cage where is the 4 5 is a bilateral expandable titanium 3D pended cage.\par \par MRI of the cervical spine from [facility] has been reviewed from September 2022 that demonstrates essentially 3 levels of prominent cervical spondylosis at 45 56 and 6 7 to a lesser degree C3-C4.  There is no myelomalacia changes

## 2023-03-10 NOTE — DISCUSSION/SUMMARY
[de-identified] : Based upon this patient's pain profile that is gradually worsening and the duration that has been going on for approximately 2+ years and failed physical therapy chiropractic care injections oral medications etc. patient wishes to pursue surgical management of her cervical spine I discussed based upon prominent cervical spondylosis at 45 56 and 67 3 level ACDF 3 4 does have a bulging disc but may not include this motion segment.  CAT scan is going to be obtained of the cervical spine patient wishes to pursue surgical management based upon worsening pain radiculopathy and essentially worsening quality of life secondary to pain patient states she is no longer able to work out or do aerobic conditioning or core strengthening because of persistent cervical / neck pain.\par \par This has been discussed with via peer to peer review with regard to patient's physical therapy this patient was sent here from a pain management provider with worsening radiculopathy I discussed this case with the patient states her motor strength is worsening is now 3 to 4-/5 patient clearly states she is a non-smoker.  Based upon worsening radiculopathy failed injection therapy I do not think physical therapy is going to remedy foraminal stenosis therefore I am recommending the above-mentioned surgical procedure to address her foraminal stenosis worsening radiculopathy and worsening strength

## 2023-03-14 ENCOUNTER — APPOINTMENT (OUTPATIENT)
Dept: ORTHOPEDIC SURGERY | Facility: CLINIC | Age: 53
End: 2023-03-14
Payer: MEDICARE

## 2023-03-14 VITALS
BODY MASS INDEX: 33.13 KG/M2 | HEART RATE: 67 BPM | HEIGHT: 63 IN | SYSTOLIC BLOOD PRESSURE: 126 MMHG | WEIGHT: 187 LBS | DIASTOLIC BLOOD PRESSURE: 83 MMHG

## 2023-03-14 DIAGNOSIS — M75.82 OTHER SHOULDER LESIONS, LEFT SHOULDER: ICD-10-CM

## 2023-03-14 PROCEDURE — 99214 OFFICE O/P EST MOD 30 MIN: CPT

## 2023-03-14 NOTE — DISCUSSION/SUMMARY
[de-identified] : Based upon this patient's pain profile that is gradually worsening and the duration that has been going on for approximately 2+ years and failed physical therapy chiropractic care injections oral medications etc. patient wishes to pursue surgical management of her cervical spine I discussed based upon prominent cervical spondylosis at 45 56 and 67 3 level ACDF 3 4 does have a bulging disc but may not include this motion segment. CAT scan is going to be obtained of the cervical spine patient wishes to pursue surgical management based upon worsening pain radiculopathy and essentially worsening quality of life secondary to pain patient states she is no longer able to work out or do aerobic conditioning or core strengthening because of persistent cervical / neck pain.\par \par This has been discussed with via peer to peer review with regard to patient's physical therapy this patient was sent here from a pain management provider with worsening radiculopathy I discussed this case with the patient states her motor strength is worsening is now 3 to 4-/5 patient clearly states she is a non-smoker. Based upon worsening radiculopathy failed injection therapy I do not think physical therapy is going to remedy foraminal stenosis therefore I am recommending the above-mentioned surgical procedure to address her foraminal stenosis worsening radiculopathy and worsening strength. \par Regarding rotator cuff tendinopathy on the left, I have shown her and she has stated that she understands wall walking and pendulum exercises she will do daily to maintain her range of motion.  Heat, ice, topicals as needed regarding her shoulder pain.  She was advised that her shoulder pain will not get better after an ACDF type surgery but we will order physical therapy 1 month after surgery for decrease pain and inflammation of the neck as well as to do rotator cuff protocol for the left shoulder.  Pain continues, we will refer her to shoulder specialist.

## 2023-03-14 NOTE — HISTORY OF PRESENT ILLNESS
[de-identified] : Kelly is a very pleasant woman well-known to us with regard to lumbar spondylosis she is status post two-level lumbar fusion 4 5 and 5 1 backless recent lumbar revision was 2021 her primary complaint at this point is cervical pain cervical spondylosis with a known diagnosis. She has been under physical therapy chiropractic pain management her pain management provider is Dr. Raza and Yanni she is also noticing radiculopathy and dorsum of her hands. DEE questionnaire thankfully is negative. \par  Does also have focal left shoulder pain on abduction and flexion has been going on several months.  She is not doing any physical therapy for this.\par \par She states the symptoms are worsening. The patient mentions the symptoms started 2+/- year(s) ago. Pain levels include a maximum pain level of 10/10. \par She states the symptoms seem to be constant. \par \par Modifying factors - worsened by bending and worsened by lifting. Relieving factors include relieved by rest. \par Associated Symptoms: no ataxia, no incontinence, good dexterity and no urinary retention. \par \par

## 2023-03-14 NOTE — PHYSICAL EXAM
[de-identified] : CONSTITUTIONAL: Patient is a very pleasant individual who is well-nourished and appears stated age. \par PSYCHIATRIC: Alert and oriented times three and in no apparent distress, and participates with orthopedic evaluation well.\par HEAD: Atraumatic and nonsyndromic in appearance.\par EENT: No thyromegaly, EOMI.\par RESPIRATORY: Respiratory rate is regular, not dyspneic on examination.\par LYMPHATICS: There is no cervical or axillary lymphadenopathy.\par INTEGUMENTARY: Skin is clean, dry, and intact about the bilateral upper extremities and cervical spine. \par VASCULAR: There is brisk capillary refill about the bilateral upper extremities and radial pulses are 2/4. \par NEUROLOGIC: Positive L'hirmitte, positive Spurling's sign. There are no pathologic reflexes. There is a decrease in sensation of the bilateral upper extremities on Wartenberg pinwheel examination and light touch consistent with a C5 and C6 distribution. Deep tendon reflexes are well-maintained at +2/4 of the bilateral upper extremities and are symmetric.\par MUSCULOSKELETAL: There is no visible muscular atrophy. Manual motor strength is well maintained in the bilateral upper extremities 3+ 4-/5 secondary to pain and apprehension. Cervical range of motion is well maintained to approximately 50% bilaterally.  Focal mechanical neck pain.  The patient ambulates in a non-myelopathic manner. Normal secondary orthopaedic exam of bilateral elbows and hands. Elbow flexion and extension, wrist extension, finger flexion and abduction are well maintained. Posterior cervical myositis.  Goal left shoulder findings, pain on abduction and flexion.  Tenderness to the lateral shoulder.\par \par  [de-identified] : X-rays of the cervical spine have been reviewed on last visit February [place] 2023, 2 views of the cervical spine there is age-appropriate loss of cervical lordosis there is focal cervical spondylosis at C5-C6.\par \par 2 views of the lumbar spine have been reviewed that shows an L4-5 L5-S1 spinal fusion. The interbody's are in different mechanical implants at L5-S1 is a unilateral peek cage where is the 4 5 is a bilateral expandable titanium 3D pended cage.\par \par MRI of the cervical spine from [facility] has been reviewed from September 2022 that demonstrates essentially 3 levels of prominent cervical spondylosis at 45 56 and 6 7 to a lesser degree C3-C4. There is no myelomalacia changes. \par \par CT scan of the cervical spine done at [facility] radiology demonstrates 3 levels of degenerative disc disease and spondylosis C4-C5 C5-C6 C6-C7

## 2023-03-16 ENCOUNTER — APPOINTMENT (OUTPATIENT)
Dept: INTERNAL MEDICINE | Facility: CLINIC | Age: 53
End: 2023-03-16
Payer: MEDICARE

## 2023-03-16 VITALS
HEART RATE: 85 BPM | TEMPERATURE: 98.5 F | BODY MASS INDEX: 32.6 KG/M2 | RESPIRATION RATE: 16 BRPM | DIASTOLIC BLOOD PRESSURE: 80 MMHG | HEIGHT: 63 IN | SYSTOLIC BLOOD PRESSURE: 120 MMHG | OXYGEN SATURATION: 99 % | WEIGHT: 184 LBS

## 2023-03-16 DIAGNOSIS — Z01.818 ENCOUNTER FOR OTHER PREPROCEDURAL EXAMINATION: ICD-10-CM

## 2023-03-16 PROCEDURE — 99214 OFFICE O/P EST MOD 30 MIN: CPT

## 2023-03-16 NOTE — PLAN
[FreeTextEntry1] : Fax [phone]\par \par  Advised to hold all vitamins, NSAIDS including Motrin, Advil, Aleve, ASA, fish oil, all supplements 7 days prior to surgery. \par \par Close airway monitoring and oxygen saturation monitoring is required\par \par Continue medications as directed.\par \par DVT prophylaxis\par \par Pt optimized for surgery

## 2023-03-16 NOTE — PHYSICAL EXAM
[No Acute Distress] : no acute distress [Normal Oropharynx] : the oropharynx was normal [Normal TMs] : both tympanic membranes were normal [No Respiratory Distress] : no respiratory distress  [No Accessory Muscle Use] : no accessory muscle use [Clear to Auscultation] : lungs were clear to auscultation bilaterally [Regular Rhythm] : with a regular rhythm [Normal Rate] : normal rate  [Normal S1, S2] : normal S1 and S2 [No Edema] : there was no peripheral edema [Soft] : abdomen soft [Non-distended] : non-distended [Normal Bowel Sounds] : normal bowel sounds [Coordination Grossly Intact] : coordination grossly intact [No Focal Deficits] : no focal deficits [Normal Gait] : normal gait [Normal Affect] : the affect was normal [Alert and Oriented x3] : oriented to person, place, and time [de-identified] : Goiter [de-identified] : +CVA tenderness, reproducible pain on palpation B/L lower back [de-identified] : Diminished strength B/L upper extremity

## 2023-03-16 NOTE — HISTORY OF PRESENT ILLNESS
[Coronary Artery Disease] : coronary artery disease [Self] : previous adverse anesthesia reaction [(Patient denies any chest pain, claudication, dyspnea on exertion, orthopnea, palpitations or syncope)] : Patient denies any chest pain, claudication, dyspnea on exertion, orthopnea, palpitations or syncope [Atrial Fibrillation] : no atrial fibrillation [Recent Myocardial Infarction] : no recent myocardial infarction [Implantable Device/Pacemaker] : no implantable device/pacemaker [Asthma] : no asthma [COPD] : no COPD [Sleep Apnea] : no sleep apnea [Smoker] : not a smoker [Family Member] : no family member with adverse anesthesia reaction/sudden death [Chronic Anticoagulation] : no chronic anticoagulation [Chronic Kidney Disease] : no chronic kidney disease [Diabetes] : no diabetes [FreeTextEntry1] :  anterior cervical discectomy fusion C4-C5. C5-C6. C6-C7 [FreeTextEntry3] : Dr. Bhagat  [FreeTextEntry2] : 3/27/23 [FreeTextEntry4] : Patient is a 52- year- old female with PMH HLD, HTN, depression, FM, Gerd, Pre-DM, Lumbar DDD, H/O Spinal fusion L5 S1 presents to office today for preop exam for MCA of anterior cervical discectomy fusion C4-C5. C5-C6. C6-C7 on 3/27/23 with Dr. Bhagat. \par \par Patient has attended physical therapy and chiropractic pain management for 2 years with no improvement. She has also failed chiropractic care, injections and oral medications.\par \par The patient states she feels well today, no acute complaints. \par  [FreeTextEntry5] : Had surgery in 2015- patient report she was told she stopped breathing during surgery by Dr. Bhagat  [FreeTextEntry7] : No prior cardiac hx or testing

## 2023-03-24 NOTE — PATIENT PROFILE ADULT - FALL HARM RISK - HARM RISK INTERVENTIONS

## 2023-03-26 ENCOUNTER — TRANSCRIPTION ENCOUNTER (OUTPATIENT)
Age: 53
End: 2023-03-26

## 2023-03-27 ENCOUNTER — INPATIENT (INPATIENT)
Facility: HOSPITAL | Age: 53
LOS: 0 days | Discharge: HOME | DRG: 473 | End: 2023-03-28
Attending: ORTHOPAEDIC SURGERY | Admitting: ORTHOPAEDIC SURGERY
Payer: MEDICARE

## 2023-03-27 ENCOUNTER — APPOINTMENT (OUTPATIENT)
Dept: ORTHOPEDIC SURGERY | Facility: HOSPITAL | Age: 53
End: 2023-03-27

## 2023-03-27 ENCOUNTER — TRANSCRIPTION ENCOUNTER (OUTPATIENT)
Age: 53
End: 2023-03-27

## 2023-03-27 VITALS
HEART RATE: 60 BPM | RESPIRATION RATE: 16 BRPM | TEMPERATURE: 98 F | SYSTOLIC BLOOD PRESSURE: 108 MMHG | OXYGEN SATURATION: 96 % | HEIGHT: 63 IN | DIASTOLIC BLOOD PRESSURE: 61 MMHG | WEIGHT: 185.19 LBS

## 2023-03-27 DIAGNOSIS — M47.812 SPONDYLOSIS WITHOUT MYELOPATHY OR RADICULOPATHY, CERVICAL REGION: ICD-10-CM

## 2023-03-27 DIAGNOSIS — Z90.711 ACQUIRED ABSENCE OF UTERUS WITH REMAINING CERVICAL STUMP: Chronic | ICD-10-CM

## 2023-03-27 DIAGNOSIS — Z98.1 ARTHRODESIS STATUS: Chronic | ICD-10-CM

## 2023-03-27 LAB
BLD GP AB SCN SERPL QL: SIGNIFICANT CHANGE UP
GLUCOSE BLDC GLUCOMTR-MCNC: 107 MG/DL — HIGH (ref 70–99)
GLUCOSE BLDC GLUCOMTR-MCNC: 124 MG/DL — HIGH (ref 70–99)

## 2023-03-27 PROCEDURE — 22552 ARTHRD ANT NTRBD CERVICAL EA: CPT

## 2023-03-27 PROCEDURE — 22551 ARTHRD ANT NTRBDY CERVICAL: CPT

## 2023-03-27 PROCEDURE — 22853 INSJ BIOMECHANICAL DEVICE: CPT | Mod: AS

## 2023-03-27 PROCEDURE — 99222 1ST HOSP IP/OBS MODERATE 55: CPT

## 2023-03-27 PROCEDURE — 22552 ARTHRD ANT NTRBD CERVICAL EA: CPT | Mod: AS

## 2023-03-27 PROCEDURE — 22845 INSERT SPINE FIXATION DEVICE: CPT | Mod: 59

## 2023-03-27 PROCEDURE — 22853 INSJ BIOMECHANICAL DEVICE: CPT

## 2023-03-27 PROCEDURE — 22551 ARTHRD ANT NTRBDY CERVICAL: CPT | Mod: AS

## 2023-03-27 PROCEDURE — 22845 INSERT SPINE FIXATION DEVICE: CPT | Mod: AS,59

## 2023-03-27 DEVICE — FLOSEAL FAST PREP 10ML: Type: IMPLANTABLE DEVICE | Status: FUNCTIONAL

## 2023-03-27 DEVICE — GELFOAM SZ 100 UNCOMPRESSED: Type: IMPLANTABLE DEVICE | Status: FUNCTIONAL

## 2023-03-27 DEVICE — IMP CASCADIA CERVICAL 1 2DEG 7X14X12MM: Type: IMPLANTABLE DEVICE | Status: FUNCTIONAL

## 2023-03-27 DEVICE — FLOSEAL WITH RECOTHROM THROMBIN 5ML: Type: IMPLANTABLE DEVICE | Status: FUNCTIONAL

## 2023-03-27 DEVICE — PIN ACDF 60 MM: Type: IMPLANTABLE DEVICE | Status: FUNCTIONAL

## 2023-03-27 DEVICE — SCREW OZARK SELF START 4.35X14MM: Type: IMPLANTABLE DEVICE | Status: FUNCTIONAL

## 2023-03-27 DEVICE — PLATE 3 LVL 57MM: Type: IMPLANTABLE DEVICE | Status: FUNCTIONAL

## 2023-03-27 DEVICE — PIN CAP ACDF TAPERED: Type: IMPLANTABLE DEVICE | Status: FUNCTIONAL

## 2023-03-27 RX ORDER — ASPIRIN/CALCIUM CARB/MAGNESIUM 324 MG
325 TABLET ORAL DAILY
Refills: 0 | Status: DISCONTINUED | OUTPATIENT
Start: 2023-03-28 | End: 2023-03-28

## 2023-03-27 RX ORDER — DULOXETINE HYDROCHLORIDE 30 MG/1
0 CAPSULE, DELAYED RELEASE ORAL
Qty: 0 | Refills: 0 | DISCHARGE

## 2023-03-27 RX ORDER — FENTANYL CITRATE 50 UG/ML
25 INJECTION INTRAVENOUS
Refills: 0 | Status: DISCONTINUED | OUTPATIENT
Start: 2023-03-27 | End: 2023-03-27

## 2023-03-27 RX ORDER — CELECOXIB 200 MG/1
200 CAPSULE ORAL EVERY 12 HOURS
Refills: 0 | Status: DISCONTINUED | OUTPATIENT
Start: 2023-03-27 | End: 2023-03-28

## 2023-03-27 RX ORDER — OXCARBAZEPINE 300 MG/1
300 TABLET, FILM COATED ORAL
Refills: 0 | Status: DISCONTINUED | OUTPATIENT
Start: 2023-03-27 | End: 2023-03-28

## 2023-03-27 RX ORDER — VALACYCLOVIR 500 MG/1
1000 TABLET, FILM COATED ORAL DAILY
Refills: 0 | Status: DISCONTINUED | OUTPATIENT
Start: 2023-03-27 | End: 2023-03-28

## 2023-03-27 RX ORDER — BENZOCAINE AND MENTHOL 5; 1 G/100ML; G/100ML
1 LIQUID ORAL
Refills: 0 | Status: DISCONTINUED | OUTPATIENT
Start: 2023-03-27 | End: 2023-03-28

## 2023-03-27 RX ORDER — DIAZEPAM 5 MG
2 TABLET ORAL EVERY 12 HOURS
Refills: 0 | Status: DISCONTINUED | OUTPATIENT
Start: 2023-03-27 | End: 2023-03-28

## 2023-03-27 RX ORDER — VALACYCLOVIR 500 MG/1
0 TABLET, FILM COATED ORAL
Qty: 0 | Refills: 0 | DISCHARGE

## 2023-03-27 RX ORDER — METHOCARBAMOL 500 MG/1
750 TABLET, FILM COATED ORAL EVERY 8 HOURS
Refills: 0 | Status: DISCONTINUED | OUTPATIENT
Start: 2023-03-27 | End: 2023-03-28

## 2023-03-27 RX ORDER — NEBIVOLOL HYDROCHLORIDE 5 MG/1
5 TABLET ORAL DAILY
Refills: 0 | Status: DISCONTINUED | OUTPATIENT
Start: 2023-03-28 | End: 2023-03-28

## 2023-03-27 RX ORDER — ONDANSETRON 8 MG/1
4 TABLET, FILM COATED ORAL EVERY 6 HOURS
Refills: 0 | Status: DISCONTINUED | OUTPATIENT
Start: 2023-03-27 | End: 2023-03-28

## 2023-03-27 RX ORDER — ESOMEPRAZOLE MAGNESIUM 40 MG/1
1 CAPSULE, DELAYED RELEASE ORAL
Qty: 0 | Refills: 0 | DISCHARGE

## 2023-03-27 RX ORDER — ACETAMINOPHEN 500 MG
975 TABLET ORAL ONCE
Refills: 0 | Status: COMPLETED | OUTPATIENT
Start: 2023-03-27 | End: 2023-03-27

## 2023-03-27 RX ORDER — APREPITANT 80 MG/1
40 CAPSULE ORAL ONCE
Refills: 0 | Status: COMPLETED | OUTPATIENT
Start: 2023-03-27 | End: 2023-03-27

## 2023-03-27 RX ORDER — TOBRAMYCIN 0.3 %
1 DROPS OPHTHALMIC (EYE) EVERY 4 HOURS
Refills: 0 | Status: COMPLETED | OUTPATIENT
Start: 2023-03-27 | End: 2023-03-27

## 2023-03-27 RX ORDER — PRUCALOPRIDE 2 MG/1
1 TABLET, FILM COATED ORAL
Qty: 0 | Refills: 0 | DISCHARGE

## 2023-03-27 RX ORDER — OXCARBAZEPINE 300 MG/1
1 TABLET, FILM COATED ORAL
Qty: 0 | Refills: 0 | DISCHARGE

## 2023-03-27 RX ORDER — GABAPENTIN 400 MG/1
0 CAPSULE ORAL
Qty: 0 | Refills: 0 | DISCHARGE

## 2023-03-27 RX ORDER — HYDROMORPHONE HYDROCHLORIDE 2 MG/ML
0.5 INJECTION INTRAMUSCULAR; INTRAVENOUS; SUBCUTANEOUS
Refills: 0 | Status: DISCONTINUED | OUTPATIENT
Start: 2023-03-27 | End: 2023-03-27

## 2023-03-27 RX ORDER — GABAPENTIN 400 MG/1
600 CAPSULE ORAL AT BEDTIME
Refills: 0 | Status: DISCONTINUED | OUTPATIENT
Start: 2023-03-27 | End: 2023-03-28

## 2023-03-27 RX ORDER — NEBIVOLOL HYDROCHLORIDE 5 MG/1
1 TABLET ORAL
Qty: 0 | Refills: 0 | DISCHARGE

## 2023-03-27 RX ORDER — ACETAMINOPHEN 500 MG
975 TABLET ORAL EVERY 8 HOURS
Refills: 0 | Status: DISCONTINUED | OUTPATIENT
Start: 2023-03-27 | End: 2023-03-28

## 2023-03-27 RX ORDER — TRAZODONE HCL 50 MG
150 TABLET ORAL AT BEDTIME
Refills: 0 | Status: DISCONTINUED | OUTPATIENT
Start: 2023-03-27 | End: 2023-03-28

## 2023-03-27 RX ORDER — ONDANSETRON 8 MG/1
4 TABLET, FILM COATED ORAL ONCE
Refills: 0 | Status: DISCONTINUED | OUTPATIENT
Start: 2023-03-27 | End: 2023-03-27

## 2023-03-27 RX ORDER — SENNA PLUS 8.6 MG/1
2 TABLET ORAL AT BEDTIME
Refills: 0 | Status: DISCONTINUED | OUTPATIENT
Start: 2023-03-27 | End: 2023-03-28

## 2023-03-27 RX ORDER — GABAPENTIN 400 MG/1
300 CAPSULE ORAL DAILY
Refills: 0 | Status: DISCONTINUED | OUTPATIENT
Start: 2023-03-27 | End: 2023-03-28

## 2023-03-27 RX ORDER — OXYCODONE HYDROCHLORIDE 5 MG/1
10 TABLET ORAL
Refills: 0 | Status: DISCONTINUED | OUTPATIENT
Start: 2023-03-27 | End: 2023-03-28

## 2023-03-27 RX ORDER — ATORVASTATIN CALCIUM 80 MG/1
20 TABLET, FILM COATED ORAL AT BEDTIME
Refills: 0 | Status: DISCONTINUED | OUTPATIENT
Start: 2023-03-27 | End: 2023-03-28

## 2023-03-27 RX ORDER — TRAZODONE HCL 50 MG
0 TABLET ORAL
Qty: 0 | Refills: 0 | DISCHARGE

## 2023-03-27 RX ORDER — CELECOXIB 200 MG/1
200 CAPSULE ORAL ONCE
Refills: 0 | Status: COMPLETED | OUTPATIENT
Start: 2023-03-27 | End: 2023-03-27

## 2023-03-27 RX ORDER — ERYTHROMYCIN BASE 5 MG/GRAM
1 OINTMENT (GRAM) OPHTHALMIC (EYE) EVERY 4 HOURS
Refills: 0 | Status: DISCONTINUED | OUTPATIENT
Start: 2023-03-27 | End: 2023-03-28

## 2023-03-27 RX ORDER — OXYCODONE HYDROCHLORIDE 5 MG/1
5 TABLET ORAL
Refills: 0 | Status: DISCONTINUED | OUTPATIENT
Start: 2023-03-27 | End: 2023-03-28

## 2023-03-27 RX ORDER — SODIUM CHLORIDE 9 MG/ML
1000 INJECTION, SOLUTION INTRAVENOUS
Refills: 0 | Status: DISCONTINUED | OUTPATIENT
Start: 2023-03-27 | End: 2023-03-28

## 2023-03-27 RX ORDER — MAGNESIUM HYDROXIDE 400 MG/1
30 TABLET, CHEWABLE ORAL EVERY 12 HOURS
Refills: 0 | Status: DISCONTINUED | OUTPATIENT
Start: 2023-03-27 | End: 2023-03-28

## 2023-03-27 RX ORDER — PANTOPRAZOLE SODIUM 20 MG/1
40 TABLET, DELAYED RELEASE ORAL
Refills: 0 | Status: DISCONTINUED | OUTPATIENT
Start: 2023-03-27 | End: 2023-03-28

## 2023-03-27 RX ORDER — ATORVASTATIN CALCIUM 80 MG/1
1 TABLET, FILM COATED ORAL
Qty: 0 | Refills: 0 | DISCHARGE

## 2023-03-27 RX ORDER — DULOXETINE HYDROCHLORIDE 30 MG/1
60 CAPSULE, DELAYED RELEASE ORAL DAILY
Refills: 0 | Status: DISCONTINUED | OUTPATIENT
Start: 2023-03-27 | End: 2023-03-28

## 2023-03-27 RX ADMIN — APREPITANT 40 MILLIGRAM(S): 80 CAPSULE ORAL at 07:18

## 2023-03-27 RX ADMIN — OXYCODONE HYDROCHLORIDE 10 MILLIGRAM(S): 5 TABLET ORAL at 13:56

## 2023-03-27 RX ADMIN — CELECOXIB 200 MILLIGRAM(S): 200 CAPSULE ORAL at 07:16

## 2023-03-27 RX ADMIN — Medication 150 MILLIGRAM(S): at 23:04

## 2023-03-27 RX ADMIN — Medication 975 MILLIGRAM(S): at 17:29

## 2023-03-27 RX ADMIN — HYDROMORPHONE HYDROCHLORIDE 0.5 MILLIGRAM(S): 2 INJECTION INTRAMUSCULAR; INTRAVENOUS; SUBCUTANEOUS at 11:50

## 2023-03-27 RX ADMIN — OXCARBAZEPINE 300 MILLIGRAM(S): 300 TABLET, FILM COATED ORAL at 18:02

## 2023-03-27 RX ADMIN — Medication 975 MILLIGRAM(S): at 23:03

## 2023-03-27 RX ADMIN — SODIUM CHLORIDE 3 MILLILITER(S): 9 INJECTION INTRAMUSCULAR; INTRAVENOUS; SUBCUTANEOUS at 15:41

## 2023-03-27 RX ADMIN — Medication 100 MILLIGRAM(S): at 23:03

## 2023-03-27 RX ADMIN — CELECOXIB 200 MILLIGRAM(S): 200 CAPSULE ORAL at 18:01

## 2023-03-27 RX ADMIN — Medication 1 APPLICATION(S): at 23:03

## 2023-03-27 RX ADMIN — HYDROMORPHONE HYDROCHLORIDE 0.5 MILLIGRAM(S): 2 INJECTION INTRAMUSCULAR; INTRAVENOUS; SUBCUTANEOUS at 11:35

## 2023-03-27 RX ADMIN — SENNA PLUS 2 TABLET(S): 8.6 TABLET ORAL at 23:04

## 2023-03-27 RX ADMIN — ATORVASTATIN CALCIUM 20 MILLIGRAM(S): 80 TABLET, FILM COATED ORAL at 23:03

## 2023-03-27 RX ADMIN — Medication 1 DROP(S): at 18:03

## 2023-03-27 RX ADMIN — CELECOXIB 200 MILLIGRAM(S): 200 CAPSULE ORAL at 19:10

## 2023-03-27 RX ADMIN — Medication 1 DROP(S): at 13:15

## 2023-03-27 RX ADMIN — Medication 975 MILLIGRAM(S): at 15:59

## 2023-03-27 RX ADMIN — GABAPENTIN 600 MILLIGRAM(S): 400 CAPSULE ORAL at 23:08

## 2023-03-27 RX ADMIN — METHOCARBAMOL 750 MILLIGRAM(S): 500 TABLET, FILM COATED ORAL at 23:04

## 2023-03-27 RX ADMIN — METHOCARBAMOL 750 MILLIGRAM(S): 500 TABLET, FILM COATED ORAL at 13:32

## 2023-03-27 RX ADMIN — SODIUM CHLORIDE 3 MILLILITER(S): 9 INJECTION INTRAMUSCULAR; INTRAVENOUS; SUBCUTANEOUS at 22:46

## 2023-03-27 RX ADMIN — OXYCODONE HYDROCHLORIDE 10 MILLIGRAM(S): 5 TABLET ORAL at 19:14

## 2023-03-27 RX ADMIN — Medication 975 MILLIGRAM(S): at 06:52

## 2023-03-27 RX ADMIN — SODIUM CHLORIDE 80 MILLILITER(S): 9 INJECTION, SOLUTION INTRAVENOUS at 22:58

## 2023-03-27 RX ADMIN — HYDROMORPHONE HYDROCHLORIDE 0.5 MILLIGRAM(S): 2 INJECTION INTRAMUSCULAR; INTRAVENOUS; SUBCUTANEOUS at 12:05

## 2023-03-27 RX ADMIN — Medication 1 DROP(S): at 23:04

## 2023-03-27 RX ADMIN — Medication 975 MILLIGRAM(S): at 23:48

## 2023-03-27 RX ADMIN — Medication 1 DROP(S): at 13:35

## 2023-03-27 RX ADMIN — Medication 100 MILLIGRAM(S): at 16:04

## 2023-03-27 NOTE — DISCHARGE NOTE PROVIDER - NSDCCPCAREPLAN_GEN_ALL_CORE_FT
PRINCIPAL DISCHARGE DIAGNOSIS  Diagnosis: Spondylosis of cervical region without myelopathy or radiculopathy  Assessment and Plan of Treatment:

## 2023-03-27 NOTE — BRIEF OPERATIVE NOTE - OPERATION/FINDINGS
I assisted all aspects of operative positioning including placing shoulder bolster, taping of shoulders in a slightly dependent position, maintenance of head and an extended position. I obtained fluoroscopic imaging confirming the appropriate visualization of levels.  I participated in operative time out.   I cleansed the operative area with Betadine and RN then prepped with DuraPrep. I participated in draping with blue drapes and ioban and then ensured that drapes were appropriately positioned.I assisted with surgical exposure to the pretracheal prevertebral fascia using toothless pickups and then Cloward retractors during which time I assisted with maintenance of hemostasis with Surgi-Mitch, Ray-Bonita, persistent suction, intermittent irrigation. I assisted with sequential placement of [place] pins at level and level. Annulotomies were performed at level and level using a Bovie cautery as well as a 15 blade. I assisted with discectomy procedure at C4-C5, C5-C6, C6-C7 by using pituitary, micropituitary and suction.  I assisted with distraction of the disc space using K2 M/Tejal retractor. After placement of intervertebral grafts with a combination of allograft (DBM/Adri) and autograft (from bur shavings), I assisted with plate placement over the anterior cervical spine by using Cloward retraction, intermittent irrigation, consistent suction.  I verified that screws were placed, tightened and torqued per [place] protocol. Copious irrigation was then used, final hemostasis was performed with FloSeal, Ray-Bonita, and bipolar cautery. Fluoroscopic imaging confirmed appropriate placement of implants. I confirmed with operating surgeon and neuro monitoring that final neuro monitoring was stable.  .10 round LIV drain was placed and  Closure was performed platysma with 2-0 vicryl, superficial fascia with 3-0 vicryl and skin with 4-0 MONOCRYL.
severe spondylosis and post. osteophytes

## 2023-03-27 NOTE — CONSULT NOTE ADULT - ASSESSMENT
52 y/o F with Hx of HLD, pre DM, celiac disease, HTN, anxiety and depression, she has posterior neck radiating to left shoulder for about 2 years. Pain is becoming progressively worse, completed cortisone injections previously with the last one 2022 with no relief, She has been under physical therapy, chiropractic, pain management without relief, came in here for elective anterior cervical discectomy fusion C4-C5, C5-C6, C6-C& on 3/27/2023 s/p procedure    Plan:     Neck pain s/p anterior cervical discectomy fusion C4-C5, C5-C6 post op day 0:     - VS stable  - abx per ortho   - c/w anti hypertensive to be restarted post op day 02 except if blood pressure goes >150 systolic   - c/w IVF x 24 hrs then reassess per ortho  - opiate induced constipation regimen   - encouraging incentive spirometry   -c/w local wound care per ortho   -DVT prophylaxis and Pain meds as per Ortho team   -PT/OT and weight bearing per ortho   - methocarbamol 750 mg 3 times a day, As Needed -for muscle spasm     Deression: On Cymbalta 60 mg 2 times a day    HTN: On nebivolol 5 mg once a day with holding parameters       · 	OXcarbazepine 300 mg oral tablet: Last Dose Taken:  , 1 tab(s) orally 2 times a day    HLD: Atorvastatin 20 mg once a day.        · 	Valtrex 1 g oral tablet: Last Dose Taken:  , orally once a day  · 	Motegrity 1 mg oral tablet: Last Dose Taken:  , 1 tab(s) orally once a day  · 	traZODone 150 mg oral tablet: orally once a day  · 	gabapentin 600 mg oral tablet: orally once a day (at bedtime) and 300 in the morning  · 	esomeprazole 20 mg oral delayed release capsule: Last Dose Taken:  , 1 cap(s) orally once a day 54 y/o F with Hx of HLD, pre DM, celiac disease, HTN, anxiety and depression, she has posterior neck radiating to left shoulder for about 2 years. Pain is becoming progressively worse, completed cortisone injections previously with the last one 2022 with no relief, She has been under physical therapy, chiropractic, pain management without relief, came in here for elective anterior cervical discectomy fusion C4-C5, C5-C6, C6-C& on 3/27/2023 s/p procedure    Plan:     Neck pain s/p anterior cervical discectomy fusion C4-C5, C5-C6 post op day 0:     - VS stable  - abx per ortho   - c/w anti hypertensive to be restarted post op day 02 except if blood pressure goes >150 systolic   - c/w IVF x 24 hrs then reassess per ortho  - opiate induced constipation regimen   - encouraging incentive spirometry   -c/w local wound care per ortho   -DVT prophylaxis and Pain meds as per Ortho team   -PT/OT and weight bearing per ortho   - methocarbamol 750 mg 3 times a day, As Needed -for muscle spasm   -gabapentin 600 mg oral tablet: orally once a day (at bedtime) and 300 in the morning    Depression: On Cymbalta 60 mg 2 times a day, traZODone 150 mg once a day    HTN: On nebivolol 5 mg once a day with holding parameters       HLD: Atorvastatin 20 mg once a day.     Hx of Constipation: On Motegrity 1 mg once a day    GERD: On Esomeprazole 20 mg once a day     Valtrex 1 g oral tablet: Last Dose Taken:  , orally once a day    OXcarbazepine 300 mg 2 times a day 54 y/o F with Hx of HLD, pre DM, celiac disease, HTN, anxiety and depression, she has posterior neck radiating to left shoulder for about 2 years. Pain is becoming progressively worse, completed cortisone injections previously with the last one 2022 with no relief, She has been under physical therapy, chiropractic, pain management without relief, came in here for elective anterior cervical discectomy fusion C4-C5, C5-C6, C6-C& on 3/27/2023 s/p procedure.     Plan:     Neck pain s/p anterior cervical discectomy fusion C4-C5, C5-C6 post op day 0:     - VS stable  - abx per ortho   - c/w anti hypertensive to be restarted post op day 02 except if blood pressure goes >150 systolic   - c/w IVF x 24 hrs then reassess per ortho  - opiate induced constipation regimen   - encouraging incentive spirometry   -c/w local wound care per ortho   -DVT prophylaxis and Pain meds as per Ortho team   -PT/OT and weight bearing per ortho   - methocarbamol 750 mg 3 times a day, As Needed -for muscle spasm   -gabapentin 600 mg oral tablet: orally once a day (at bedtime) and 300 in the morning    Depression/ PTSD: On Cymbalta 60 mg 2 times a day, traZODone 150 mg once a day and OXcarbazepine 300 mg 2 times a day    HTN: On nebivolol 5 mg once a day with holding parameters     HLD: Atorvastatin 20 mg once a day.     Hx of Constipation: On Motegrity 1 mg once a day    GERD: On Esomeprazole 20 mg once a day    Hx of Herpes: On Valtrex 1 g oral tablet: Last Dose Taken:  , orally once a day

## 2023-03-27 NOTE — BRIEF OPERATIVE NOTE - COMMENTS
Osteophytes removed from C5-6 and C6-C7 with sono PET device Tejal plate 14 mm screws as well as life spine 3D printing titanium cages

## 2023-03-27 NOTE — BRIEF OPERATIVE NOTE - NSICDXBRIEFPREOP_GEN_ALL_CORE_FT
PRE-OP DIAGNOSIS:  Cervical spondylosis with radiculopathy 27-Mar-2023 10:43:28  Humphrey Bhagat  
PRE-OP DIAGNOSIS:  Cervical spondylosis with radiculopathy 27-Mar-2023 10:43:28  Humphrey Bhagat

## 2023-03-27 NOTE — PROGRESS NOTE ADULT - SUBJECTIVE AND OBJECTIVE BOX
Called to bedside by RN for Pt. with complaint of "something in my eye"  Pt. denies diplopia, blurry vision,  or photophobia.  Eye irrigated with normal saline without alleviation of symptoms.    PE:  OD: PERRL, EOMI, sclera white, conjunctiva pink, no foreign object seen. + corneal abrasion seen at the eight o'clock position.  A/P  Corneal abrasion OD  1) tetracaine opht. Solution 0.5% one drop to the affected eye times one  2) Tobramycin opht. solution 0.3% one drop to the affected eye every four hours times three doses  3) F/U with opht. if symptoms persist >24 hours  4) Case D/W Dr. Pearl

## 2023-03-27 NOTE — DISCHARGE NOTE PROVIDER - NSDCMRMEDTOKEN_GEN_ALL_CORE_FT
atorvastatin 20 mg oral tablet: 1 tab(s) orally once a day  Cymbalta 60 mg oral delayed release capsule: milligram(s) orally 2 times a day  esomeprazole 20 mg oral delayed release capsule: 1 cap(s) orally once a day  gabapentin 600 mg oral tablet: orally once a day (at bedtime) and 300 in the morning  methocarbamol 750 mg oral tablet: 1 tab(s) orally 3 times a day, As Needed -for muscle spasm   Motegrity 1 mg oral tablet: 1 tab(s) orally once a day  nebivolol 5 mg oral tablet: 1 tab(s) orally once a day  OXcarbazepine 300 mg oral tablet: 1 tab(s) orally 2 times a day  traZODone 150 mg oral tablet: orally once a day  Valtrex 1 g oral tablet: orally once a day   acetaminophen 325 mg oral tablet: 3 tab(s) orally every 8 hours  atorvastatin 20 mg oral tablet: 1 tab(s) orally once a day  Cymbalta 60 mg oral delayed release capsule: milligram(s) orally 2 times a day  esomeprazole 20 mg oral delayed release capsule: 1 cap(s) orally once a day  gabapentin 600 mg oral tablet: orally once a day (at bedtime) and 300 in the morning  methocarbamol 750 mg oral tablet: 1 tab(s) orally 3 times a day, As Needed -for muscle spasm   Motegrity 1 mg oral tablet: 1 tab(s) orally once a day  nebivolol 5 mg oral tablet: 1 tab(s) orally once a day  OXcarbazepine 300 mg oral tablet: 1 tab(s) orally 2 times a day  traZODone 150 mg oral tablet: orally once a day  Valtrex 1 g oral tablet: orally once a day

## 2023-03-27 NOTE — DISCHARGE NOTE PROVIDER - CARE PROVIDERS DIRECT ADDRESSES
,[email or web address],[email or web address]

## 2023-03-27 NOTE — CONSULT NOTE ADULT - SUBJECTIVE AND OBJECTIVE BOX
52 y/o F with Hx of HLD, pre DM, celiac disease, HTN, anxiety and depression, she has posterior neck radiating to left shoulder for about 2 years. Pain is becoming progressively worse, completed cortisone injections previously with the last one 2022 with no relief, She has been under physical therapy, chiropractic, pain management without relief, came in here for elective anterior cervical discectomy fusion C4-C5, C5-C6, C6-C& on 3/27/2023 s/p procedure, she has pain in the neck, has no chest pain or sob     Allergies:  	penicillin: Drug, Hives      PAST MEDICAL HISTORY:  At risk for sleep apnea     Depression     GERD (gastroesophageal reflux disease)     History of celiac disease     Hyperlipidemia     Hypertension     Lumbar herniated disc     Lumbar radiculopathy     PTSD (post-traumatic stress disorder).     PAST SURGICAL HISTORY:  H/O spinal fusion L5-S1    History of hysterectomy, supracervical.     FAMILY HISTORY:  Mother  Still living? Unknown  Family history of osteoarthritis, Age at diagnosis: Age Unknown  FH: diabetes mellitus, Age at diagnosis: Age Unknown    Grandparent  Still living? No  Family history of osteoarthritis, Age at diagnosis: Age Unknown  FH: ovarian cancer, Age at diagnosis: Age Unknown    Aunt  Still living? No  FH: breast cancer, Age at diagnosis: Age Unknown    Uncle  Still living? No  FH: colon cancer, Age at diagnosis: Age Unknown.     Anesthesia History:  · Previous Reaction to Anesthesia	none  · Family History of Anesthesia Reaction/Malignant Hyperthermia	Yes  · If Yes, please provide a Reaction:	Reports that mother had delayed awakening twice after anesthesia. Pt received anesthesia without any problems.  · Latex Allergy	No    Social History: Not a smoker, drinker or using any drugs        Home Medications:   * Incomplete Medication History as of 07-Mar-2023 16:01 documented in Structured Notes  · 	methocarbamol 750 mg oral tablet: Last Dose Taken:  , 1 tab(s) orally 3 times a day, As Needed -for muscle spasm   · 	Cymbalta 60 mg oral delayed release capsule: Last Dose Taken:  , milligram(s) orally 2 times a day  · 	nebivolol 5 mg oral tablet: Last Dose Taken:  , 1 tab(s) orally once a day  · 	OXcarbazepine 300 mg oral tablet: Last Dose Taken:  , 1 tab(s) orally 2 times a day  · 	atorvastatin 20 mg oral tablet: Last Dose Taken:  , 1 tab(s) orally once a day  · 	Valtrex 1 g oral tablet: Last Dose Taken:  , orally once a day  · 	Motegrity 1 mg oral tablet: Last Dose Taken:  , 1 tab(s) orally once a day  · 	traZODone 150 mg oral tablet: orally once a day  · 	gabapentin 600 mg oral tablet: orally once a day (at bedtime) and 300 in the morning  · 	esomeprazole 20 mg oral delayed release capsule: Last Dose Taken:  , 1 cap(s) orally once a day       54 y/o F with Hx of HLD, pre DM, celiac disease, HTN, anxiety and depression, she has posterior neck radiating to left shoulder for about 2 years. Pain is becoming progressively worse, completed cortisone injections previously with the last one 2022 with no relief, She has been under physical therapy, chiropractic, pain management without relief, came in here for elective anterior cervical discectomy fusion C4-C5, C5-C6, C6-C& on 3/27/2023 s/p procedure, she has pain in the neck, has no chest pain or sob, dizziness, fever, chills     REVIEW OF SYSTEMS:    CONSTITUTIONAL: No fever, some fatigue  RESPIRATORY: No cough, No shortness of breath  CARDIOVASCULAR: No chest pain, palpitations  GASTROINTESTINAL: No abdominal, No nausea, vomiting  NEUROLOGICAL: No headaches,  loss of strength.  MISCELLANEOUS: Neck pain     Allergies:  	penicillin: Drug, Hives      PAST MEDICAL HISTORY:  At risk for sleep apnea     Depression     GERD (gastroesophageal reflux disease)     History of celiac disease     Hyperlipidemia     Hypertension     Lumbar herniated disc     Lumbar radiculopathy     PTSD (post-traumatic stress disorder).     PAST SURGICAL HISTORY:  H/O spinal fusion L5-S1    History of hysterectomy, supracervical.     FAMILY HISTORY:  Mother  Still living? Unknown  Family history of osteoarthritis, Age at diagnosis: Age Unknown  FH: diabetes mellitus, Age at diagnosis: Age Unknown    Grandparent  Still living? No  Family history of osteoarthritis, Age at diagnosis: Age Unknown  FH: ovarian cancer, Age at diagnosis: Age Unknown    Aunt  Still living? No  FH: breast cancer, Age at diagnosis: Age Unknown    Uncle  Still living? No  FH: colon cancer, Age at diagnosis: Age Unknown.     Anesthesia History:  · Previous Reaction to Anesthesia	none  · Family History of Anesthesia Reaction/Malignant Hyperthermia	Yes  · If Yes, please provide a Reaction:	Reports that mother had delayed awakening twice after anesthesia. Pt received anesthesia without any problems.  · Latex Allergy	No    Social History: Not a smoker, drinker or using any drugs        Home Medications:   * Incomplete Medication History as of 07-Mar-2023 16:01 documented in Structured Notes  · 	methocarbamol 750 mg oral tablet: Last Dose Taken:  , 1 tab(s) orally 3 times a day, As Needed -for muscle spasm   · 	Cymbalta 60 mg oral delayed release capsule: Last Dose Taken:  , milligram(s) orally 2 times a day  · 	nebivolol 5 mg oral tablet: Last Dose Taken:  , 1 tab(s) orally once a day  · 	OXcarbazepine 300 mg oral tablet: Last Dose Taken:  , 1 tab(s) orally 2 times a day  · 	atorvastatin 20 mg oral tablet: Last Dose Taken:  , 1 tab(s) orally once a day  · 	Valtrex 1 g oral tablet: Last Dose Taken:  , orally once a day  · 	Motegrity 1 mg oral tablet: Last Dose Taken:  , 1 tab(s) orally once a day  · 	traZODone 150 mg oral tablet: orally once a day  · 	gabapentin 600 mg oral tablet: orally once a day (at bedtime) and 300 in the morning  · 	esomeprazole 20 mg oral delayed release capsule: Last Dose Taken:  , 1 cap(s) orally once a day    Vital Signs Last 24 Hrs  T(C): 36.7 (27 Mar 2023 13:30), Max: 36.7 (27 Mar 2023 13:30)  T(F): 98 (27 Mar 2023 13:30), Max: 98 (27 Mar 2023 13:30)  HR: 67 (27 Mar 2023 14:00) (60 - 75)  BP: 105/78 (27 Mar 2023 14:00) (105/78 - 128/68)  RR: 16 (27 Mar 2023 14:00) (10 - 21)  SpO2: 94% (27 Mar 2023 14:00) (94% - 98%)    Parameters below as of 27 Mar 2023 13:30  Patient On (Oxygen Delivery Method): room air  O2 Flow (L/min): 2      PHYSICAL EXAM:    GENERAL: Middle age female looking uncomfortable    HEENT: atraumatic   NECK: Clean dressings on, on anterior aspect of neck with drain in place   CHEST/LUNG: Clear to auscultate bilaterally; No wheezing  HEART: S1S2+, Regular rate and rhythm; No murmurs  ABDOMEN: Soft, Nontender, Nondistended; Bowel sounds present  EXTREMITIES:  2+ Peripheral Pulses, No edema  SKIN: No rashes or lesions  NEURO: OX3  PSYCH: normal mood

## 2023-03-27 NOTE — DISCHARGE NOTE PROVIDER - PROVIDER TOKENS
PROVIDER:[TOKEN:[[id number]]] PROVIDER:[TOKEN:[[id number]]],PROVIDER:[TOKEN:[[id number]]]

## 2023-03-27 NOTE — DISCHARGE NOTE PROVIDER - NSDCFUADDINST_GEN_ALL_CORE_FT
Leave occlusive dressing on wound for one week. You may then remove it and leave open to air. Protect while showering.  Leave steri strips intact, they will fall off on their own or be removed on first post op visit. Wear cervical collar when out of bed for comfort, especially when you are passenger in a car , may take off for meals & showering.  Physical therapy will be prescribed on second office visit.  For now, engage in light activity as tolerated, no lifting greater than 5 lb.  No driving while on pain meds and must wear cervical collar when in a vehicle for 28 days.  Use the ROBAXIN for neck spasms as needed. Patient will take ASPIRIN 325mg once daily for 4 weeks for blood clot prevention.    Please make an appointment for follow up with your surgeon in 1-2 weeks. Call to schedule an appointment. Staples or stitches will be removed at your follow up appointment. Keep incision site clean and dry. No creams, lotions, or ointments to incision area. You are cleared to shower 3 days after surgery unless otherwise instructed.    Take pain medication as prescribed after surgery for pain control. Contact your surgeon's office if pain increases while taking prescribed pain medications or if related concerns develop. If you are taking narcotic pain medications, take a stool softener with it to prevent narcotic associated constipation. Additionally, increase water intake (drink at least 8 glasses daily) and add fiber to your diet by eating fruits, vegetables and foods that are rich in grains.     Do NOT take NSAIDs, including ibuprofen, Advil, Aleve, and Motrin for at least 3 months after your surgery as these medications can impact your healing.    NO heavy lifting, strenuous activity, twisting, bending, driving, or working until cleared by your surgeon  Contact your surgeon's office immediately for any of the following: fever, bleeding, new onset numbness/tingling/weakness, nausea and/or vomiting, urinary and/or fecal incontinence or retention. If an emergency occurs (chest pain, shortness of breath, new confusion, seizure, altered mental status, or other), call 911 and go to the emergency department for evaluation.   Leave occlusive dressing on wound for one week. You may then remove it and leave open to air. Protect while showering.  Leave steri strips intact, they will fall off on their own or be removed on first post op visit. Wear cervical collar when out of bed for comfort, especially when you are passenger in a car , may take off for meals & showering.  Physical therapy will be prescribed on second office visit.  For now, engage in light activity as tolerated, no lifting greater than 5 lb.  No driving while on pain meds and must wear cervical collar when in a vehicle for 28 days.  Use the ROBAXIN for neck spasms as needed. Patient will take ASPIRIN 325mg once daily for 4 weeks for blood clot prevention.    Please make an appointment for follow up with your surgeon in 1-2 weeks. Call to schedule an appointment. Staples or stitches will be removed at your follow up appointment. Keep incision site clean and dry. No creams, lotions, or ointments to incision area. You are cleared to shower 3 days after surgery unless otherwise instructed.    Take pain medication as prescribed after surgery for pain control. Contact your surgeon's office if pain increases while taking prescribed pain medications or if related concerns develop. If you are taking narcotic pain medications, take a stool softener with it to prevent narcotic associated constipation. Additionally, increase water intake (drink at least 8 glasses daily) and add fiber to your diet by eating fruits, vegetables and foods that are rich in grains.     Do NOT take NSAIDs, including ibuprofen, Advil, Aleve, and Motrin for at least 3 months after your surgery as these medications can impact your healing.    NO heavy lifting, strenuous activity, twisting, bending, driving, or working until cleared by your surgeon  Contact your surgeon's office immediately for any of the following: fever, bleeding, new onset numbness/tingling/weakness, nausea and/or vomiting, urinary and/or fecal incontinence or retention. If an emergency occurs (chest pain, shortness of breath, new confusion, seizure, altered mental status, or other), call 911 and go to the emergency department for evaluation.    For corneal abrasion, apply erythromycin ointment to affected eye every 4 hours for one week. Follow up with opthalmology within 1 week.

## 2023-03-27 NOTE — DISCHARGE NOTE PROVIDER - NSDCCPTREATMENT_GEN_ALL_CORE_FT
PRINCIPAL PROCEDURE  Procedure: Anterior cervical discectomy with fusion  Findings and Treatment:

## 2023-03-27 NOTE — BRIEF OPERATIVE NOTE - NSICDXBRIEFPOSTOP_GEN_ALL_CORE_FT
POST-OP DIAGNOSIS:  Cervical spondylosis with radiculopathy 27-Mar-2023 10:43:42  Humphrey Bhagat  
POST-OP DIAGNOSIS:  Cervical spondylosis with radiculopathy 27-Mar-2023 10:43:42  Humphrey Bhagat

## 2023-03-27 NOTE — DISCHARGE NOTE PROVIDER - NSDCFUSCHEDAPPT_GEN_ALL_CORE_FT
[facility]  ORTHOSURG [address]  Scheduled Appointment: 04/06/2023

## 2023-03-27 NOTE — PROGRESS NOTE ADULT - SUBJECTIVE AND OBJECTIVE BOX
JAMES REIS  [id number]  53yFemale    STATUS POST:  ACDF C4-C5, C5-C6, C6-C7 for cervical spondylosis and neck pain.    Spondylosis of cervical region without myelopathy or radiculopathy    Family history of osteoarthritis (Mother, Grandparent)    FH: diabetes mellitus (Mother)    FH: colon cancer (Uncle)    FH: breast cancer (Aunt)    FH: ovarian cancer (Grandparent)    Depression    Schizo affective schizophrenia    Hypertension    Hyperlipidemia    GERD (gastroesophageal reflux disease)    Lumbar radiculopathy    Lumbar spondylosis    Lumbar herniated disc    PTSD (post-traumatic stress disorder)    History of celiac disease    At risk for sleep apnea    Cervical spondylosis with radiculopathy    Cervical spondylosis with radiculopathy    Spondylosis of cervical region without myelopathy or radiculopathy    HTN (hypertension)    Need for prophylactic measure    Screening for substance abuse    Anterior cervical discectomy with fusion    H/O spinal fusion    History of hysterectomy, supracervical    History of secondary ovarian cancer    SPONDYLOSIS W/O MYELOPATHY OR    [email or web address]        SUBJECTIVE: Patient seen and examined doing well  Pain controlled, positive posterior neck pain as expected     OBJECTIVE:   T(C): 36.4 (03-27-23 @ 06:22), Max: 36.4 (03-27-23 @ 06:22)  HR: 60 (03-27-23 @ 06:22) (60 - 60)  BP: 108/61 (03-27-23 @ 06:22) (108/61 - 108/61)  RR: 16 (03-27-23 @ 06:22) (16 - 16)  SpO2: 96% (03-27-23 @ 06:22) (96% - 96%)  Constitutional: Pleasant in no acute distress  Psych:A&Ox3  EENT: no dysphonia, no dyspnea.  mild dysphagia as expected  Abdominal: soft and supple non distended  Lymphatics: no pretibial pitting edema  Spine:          Dressing:  clean/dry/intact, LIV patent               Sensation:          Upper extremity          grossly intact manually          Lower extremity           grossly intact manually                               Motor:                   Lower and upper extremity grossly intact manually, positive posterior cervicalgia as expected            Vascular:[x] warm well perfused; capillary refill <3 seconds                A/P :53y FemaleS/P ACDF as above  POD#0  -    Pain control- multimodal approach.  Avoid NSAIDS x 6 weeks post op since they may deter fusion.  Focus on muscle relaxers, heat.   -    DVT ppx: [ x]SCDs, early ambulation,  [id number] mg daily x 28 days post op  -    Periop abx:  Clindamycin       -    Likely 23 hour admission  -    Monitor Drain Output, can discontinue drain when output equal or less than 10 cc/hr/12 hr.  change dressing when drain pulled   -    Resume home meds as appropriate  -PT /OT WBAT, balance and gait  -brace cervical collar with OOB is for comfort and not mandatory, never to be worn in bed, with eating or hygeine but should be worn when in a motor vehicle x 28 days post op.   -medical follow up- Dr Saldana  - high BMI- Nutrition  - HTN- continue antihypertensives with parameters and when appropriate  -Depression/scizoaffective schizophrenia- continue mood stabilizing medications  - patient should be reassured that it is normal to have dysphagia post operative, encourage soft diet, cutting food in small pieces.  cepacol losenges ordered

## 2023-03-27 NOTE — PHYSICAL THERAPY INITIAL EVALUATION ADULT - PRECAUTIONS/LIMITATIONS, REHAB EVAL
c-collar not mandatory for comfort, not to be worn for hygiene, eating or in bed, to be worn in motor vechicle x28 days/spinal precautions

## 2023-03-27 NOTE — DISCHARGE NOTE PROVIDER - CARE PROVIDER_API CALL
Humphrey Bhagat (DO)  Orthopaedic Surgery  [address] [place] [address]  Phone: [phone]  Fax: [phone]  Follow Up Time:    Humphrey Bhagat (DO)  Orthopaedic Surgery  [address] [place] [address]  Phone: [phone]  Fax: [phone]  Follow Up Time:     Orlin Thompson)  Ophthalmology  [address] [place] [address]  Phone: [phone]  Fax: [phone]  Follow Up Time:

## 2023-03-27 NOTE — BRIEF OPERATIVE NOTE - NSICDXBRIEFPROCEDURE_GEN_ALL_CORE_FT
PROCEDURES:  Anterior cervical discectomy with fusion 27-Mar-2023 10:42:57  Humphrey Bhagat  
PROCEDURES:  Anterior cervical discectomy with fusion 27-Mar-2023 10:42:57  Humphrey Bhagat

## 2023-03-28 ENCOUNTER — TRANSCRIPTION ENCOUNTER (OUTPATIENT)
Age: 53
End: 2023-03-28

## 2023-03-28 VITALS
DIASTOLIC BLOOD PRESSURE: 64 MMHG | SYSTOLIC BLOOD PRESSURE: 114 MMHG | OXYGEN SATURATION: 98 % | RESPIRATION RATE: 18 BRPM | TEMPERATURE: 98 F | HEART RATE: 78 BPM

## 2023-03-28 PROCEDURE — 86901 BLOOD TYPING SEROLOGIC RH(D): CPT

## 2023-03-28 PROCEDURE — 82962 GLUCOSE BLOOD TEST: CPT

## 2023-03-28 PROCEDURE — 76000 FLUOROSCOPY <1 HR PHYS/QHP: CPT

## 2023-03-28 PROCEDURE — 97163 PT EVAL HIGH COMPLEX 45 MIN: CPT

## 2023-03-28 PROCEDURE — 86900 BLOOD TYPING SEROLOGIC ABO: CPT

## 2023-03-28 PROCEDURE — 36415 COLL VENOUS BLD VENIPUNCTURE: CPT

## 2023-03-28 PROCEDURE — 86850 RBC ANTIBODY SCREEN: CPT

## 2023-03-28 PROCEDURE — C1713: CPT

## 2023-03-28 PROCEDURE — C1889: CPT

## 2023-03-28 PROCEDURE — 99232 SBSQ HOSP IP/OBS MODERATE 35: CPT

## 2023-03-28 RX ORDER — ASPIRIN/CALCIUM CARB/MAGNESIUM 324 MG
1 TABLET ORAL
Qty: 28 | Refills: 0
Start: 2023-03-28 | End: 2023-04-24

## 2023-03-28 RX ORDER — SENNA PLUS 8.6 MG/1
2 TABLET ORAL
Qty: 14 | Refills: 0
Start: 2023-03-28 | End: 2023-04-03

## 2023-03-28 RX ORDER — ERYTHROMYCIN BASE 5 MG/GRAM
1 OINTMENT (GRAM) OPHTHALMIC (EYE)
Qty: 1 | Refills: 0
Start: 2023-03-28 | End: 2023-04-03

## 2023-03-28 RX ORDER — SENNA PLUS 8.6 MG/1
2 TABLET ORAL
Qty: 10 | Refills: 0
Start: 2023-03-28 | End: 2023-04-01

## 2023-03-28 RX ORDER — LORATADINE 10 MG/1
10 TABLET ORAL ONCE
Refills: 0 | Status: COMPLETED | OUTPATIENT
Start: 2023-03-28 | End: 2023-03-28

## 2023-03-28 RX ORDER — OXYCODONE HYDROCHLORIDE 5 MG/1
1 TABLET ORAL
Qty: 16 | Refills: 0
Start: 2023-03-28 | End: 2023-03-31

## 2023-03-28 RX ORDER — ACETAMINOPHEN 500 MG
3 TABLET ORAL
Qty: 0 | Refills: 0 | DISCHARGE
Start: 2023-03-28

## 2023-03-28 RX ORDER — OXYCODONE HYDROCHLORIDE 5 MG/1
1 TABLET ORAL
Qty: 28 | Refills: 0
Start: 2023-03-28

## 2023-03-28 RX ADMIN — SODIUM CHLORIDE 3 MILLILITER(S): 9 INJECTION INTRAMUSCULAR; INTRAVENOUS; SUBCUTANEOUS at 14:24

## 2023-03-28 RX ADMIN — GABAPENTIN 300 MILLIGRAM(S): 400 CAPSULE ORAL at 11:31

## 2023-03-28 RX ADMIN — Medication 325 MILLIGRAM(S): at 11:31

## 2023-03-28 RX ADMIN — PANTOPRAZOLE SODIUM 40 MILLIGRAM(S): 20 TABLET, DELAYED RELEASE ORAL at 06:08

## 2023-03-28 RX ADMIN — CELECOXIB 200 MILLIGRAM(S): 200 CAPSULE ORAL at 06:22

## 2023-03-28 RX ADMIN — Medication 975 MILLIGRAM(S): at 14:26

## 2023-03-28 RX ADMIN — Medication 975 MILLIGRAM(S): at 06:07

## 2023-03-28 RX ADMIN — Medication 1 APPLICATION(S): at 13:53

## 2023-03-28 RX ADMIN — Medication 975 MILLIGRAM(S): at 06:22

## 2023-03-28 RX ADMIN — OXCARBAZEPINE 300 MILLIGRAM(S): 300 TABLET, FILM COATED ORAL at 06:08

## 2023-03-28 RX ADMIN — Medication 1 APPLICATION(S): at 09:27

## 2023-03-28 RX ADMIN — SODIUM CHLORIDE 3 MILLILITER(S): 9 INJECTION INTRAMUSCULAR; INTRAVENOUS; SUBCUTANEOUS at 05:07

## 2023-03-28 RX ADMIN — Medication 975 MILLIGRAM(S): at 13:53

## 2023-03-28 RX ADMIN — Medication 1 APPLICATION(S): at 06:07

## 2023-03-28 RX ADMIN — CELECOXIB 200 MILLIGRAM(S): 200 CAPSULE ORAL at 06:07

## 2023-03-28 RX ADMIN — METHOCARBAMOL 750 MILLIGRAM(S): 500 TABLET, FILM COATED ORAL at 13:53

## 2023-03-28 RX ADMIN — METHOCARBAMOL 750 MILLIGRAM(S): 500 TABLET, FILM COATED ORAL at 06:08

## 2023-03-28 RX ADMIN — OXYCODONE HYDROCHLORIDE 10 MILLIGRAM(S): 5 TABLET ORAL at 11:30

## 2023-03-28 RX ADMIN — VALACYCLOVIR 1000 MILLIGRAM(S): 500 TABLET, FILM COATED ORAL at 11:31

## 2023-03-28 RX ADMIN — OXYCODONE HYDROCHLORIDE 10 MILLIGRAM(S): 5 TABLET ORAL at 12:20

## 2023-03-28 RX ADMIN — LORATADINE 10 MILLIGRAM(S): 10 TABLET ORAL at 12:31

## 2023-03-28 RX ADMIN — DULOXETINE HYDROCHLORIDE 60 MILLIGRAM(S): 30 CAPSULE, DELAYED RELEASE ORAL at 11:31

## 2023-03-28 NOTE — PROGRESS NOTE ADULT - SUBJECTIVE AND OBJECTIVE BOX
JAMES REIS    [id number]    53y      Female    Patient is a 53y old  Female who presents with a chief complaint of ACDF (27 Mar 2023 16:54)      INTERVAL HPI/OVERNIGHT EVENTS:    her neck pain is well controlled now, denies fever, chills, chest pain, nausea, vomiting    REVIEW OF SYSTEMS:    CONSTITUTIONAL: No fever, fatigue  RESPIRATORY: No cough, No shortness of breath  CARDIOVASCULAR: No chest pain, palpitations  GASTROINTESTINAL: No abdominal, No nausea, vomiting  NEUROLOGICAL: No headaches,  loss of strength.  MISCELLANEOUS: neck pain is well controlled       Vital Signs Last 24 Hrs  T(C): 36.8 (28 Mar 2023 05:21), Max: 36.8 (28 Mar 2023 05:21)  T(F): 98.3 (28 Mar 2023 05:21), Max: 98.3 (28 Mar 2023 05:21)  HR: 70 (28 Mar 2023 05:21) (58 - 75)  BP: 105/64 (28 Mar 2023 05:21) (104/64 - 128/68)  RR: 18 (28 Mar 2023 05:21) (10 - 21)  SpO2: 98% (28 Mar 2023 05:21) (94% - 99%)    Parameters below as of 28 Mar 2023 05:21  Patient On (Oxygen Delivery Method): nasal cannula  O2 Flow (L/min): 2      PHYSICAL EXAM:    GENERAL: Middle age female looking comfortable    HEENT: PERRL, +EOMI  NECK: soft, Supple, No JVD, clean dressing on anterior aspect of neck   CHEST/LUNG: Clear to auscultate bilaterally; No wheezing  HEART: S1S2+, Regular rate and rhythm; No murmurs  ABDOMEN: Soft, Nontender, Nondistended; Bowel sounds present  EXTREMITIES:  1+ Peripheral Pulses, No edema  SKIN: No rashes or lesions  NEURO: AAOX3  PSYCH: normal mood          27 Mar 2023 07:01  -  28 Mar 2023 07:00  --------------------------------------------------------  IN: 1190 mL / OUT: 1260 mL / NET: -70 mL        MEDICATIONS  (STANDING):  acetaminophen     Tablet .. 975 milliGRAM(s) Oral every 8 hours  aspirin enteric coated 325 milliGRAM(s) Oral daily  atorvastatin 20 milliGRAM(s) Oral at bedtime  celecoxib 200 milliGRAM(s) Oral every 12 hours  clindamycin IVPB 900 milliGRAM(s) IV Intermittent once  DULoxetine 60 milliGRAM(s) Oral daily  erythromycin   Ointment 1 Application(s) Right EYE every 4 hours  gabapentin 300 milliGRAM(s) Oral daily  gabapentin 600 milliGRAM(s) Oral at bedtime  lactated ringers. 1000 milliLiter(s) (80 mL/Hr) IV Continuous <Continuous>  methocarbamol 750 milliGRAM(s) Oral every 8 hours  nebivolol 5 milliGRAM(s) Oral daily  OXcarbazepine 300 milliGRAM(s) Oral two times a day  pantoprazole    Tablet 40 milliGRAM(s) Oral before breakfast  senna 2 Tablet(s) Oral at bedtime  sodium chloride 0.9% lock flush 3 milliLiter(s) IV Push every 8 hours  traZODone 150 milliGRAM(s) Oral at bedtime  valACYclovir 1000 milliGRAM(s) Oral daily    MEDICATIONS  (PRN):  aluminum hydroxide/magnesium hydroxide/simethicone Suspension 30 milliLiter(s) Oral every 12 hours PRN Indigestion  benzocaine/menthol Lozenge 1 Lozenge Oral every 2 hours PRN Sore Throat  diazepam    Tablet 2 milliGRAM(s) Oral every 12 hours PRN MUSCLE SPASM REFRACTORY TO METHOCARBAMOL  magnesium hydroxide Suspension 30 milliLiter(s) Oral every 12 hours PRN Constipation  ondansetron Injectable 4 milliGRAM(s) IV Push every 6 hours PRN Nausea and/or Vomiting  ondansetron Injectable 4 milliGRAM(s) IV Push every 6 hours PRN Nausea and/or Vomiting  oxyCODONE    IR 5 milliGRAM(s) Oral every 3 hours PRN Moderate Pain (4 - 6)  oxyCODONE    IR 10 milliGRAM(s) Oral every 3 hours PRN Severe Pain (7 - 10)         JAMES REIS    [id number]    53y      Female    Patient is a 53y old  Female who presents with a chief complaint of ACDF (27 Mar 2023 16:54)      INTERVAL HPI/OVERNIGHT EVENTS:    her neck pain is well controlled now, denies fever, chills, chest pain, nausea, vomiting, she some rashes around her Tegaderm and some warmth in that area     REVIEW OF SYSTEMS:    CONSTITUTIONAL: No fever, fatigue  RESPIRATORY: No cough, No shortness of breath  CARDIOVASCULAR: No chest pain, palpitations  GASTROINTESTINAL: No abdominal, No nausea, vomiting  NEUROLOGICAL: No headaches,  loss of strength.  MISCELLANEOUS: neck pain is well controlled       Vital Signs Last 24 Hrs  T(C): 36.8 (28 Mar 2023 05:21), Max: 36.8 (28 Mar 2023 05:21)  T(F): 98.3 (28 Mar 2023 05:21), Max: 98.3 (28 Mar 2023 05:21)  HR: 70 (28 Mar 2023 05:21) (58 - 75)  BP: 105/64 (28 Mar 2023 05:21) (104/64 - 128/68)  RR: 18 (28 Mar 2023 05:21) (10 - 21)  SpO2: 98% (28 Mar 2023 05:21) (94% - 99%)    Parameters below as of 28 Mar 2023 05:21  Patient On (Oxygen Delivery Method): nasal cannula  O2 Flow (L/min): 2      PHYSICAL EXAM:    GENERAL: Middle age female looking comfortable    HEENT: PERRL, +EOMI  NECK: soft, Supple, No JVD, clean dressing on anterior aspect of neck   CHEST/LUNG: Clear to auscultate bilaterally; No wheezing  HEART: S1S2+, Regular rate and rhythm; No murmurs  ABDOMEN: Soft, Nontender, Nondistended; Bowel sounds present  EXTREMITIES:  1+ Peripheral Pulses, No edema  SKIN: No rashes or lesions  NEURO: AAOX3  PSYCH: normal mood          27 Mar 2023 07:01  -  28 Mar 2023 07:00  --------------------------------------------------------  IN: 1190 mL / OUT: 1260 mL / NET: -70 mL        MEDICATIONS  (STANDING):  acetaminophen     Tablet .. 975 milliGRAM(s) Oral every 8 hours  aspirin enteric coated 325 milliGRAM(s) Oral daily  atorvastatin 20 milliGRAM(s) Oral at bedtime  celecoxib 200 milliGRAM(s) Oral every 12 hours  clindamycin IVPB 900 milliGRAM(s) IV Intermittent once  DULoxetine 60 milliGRAM(s) Oral daily  erythromycin   Ointment 1 Application(s) Right EYE every 4 hours  gabapentin 300 milliGRAM(s) Oral daily  gabapentin 600 milliGRAM(s) Oral at bedtime  lactated ringers. 1000 milliLiter(s) (80 mL/Hr) IV Continuous <Continuous>  methocarbamol 750 milliGRAM(s) Oral every 8 hours  nebivolol 5 milliGRAM(s) Oral daily  OXcarbazepine 300 milliGRAM(s) Oral two times a day  pantoprazole    Tablet 40 milliGRAM(s) Oral before breakfast  senna 2 Tablet(s) Oral at bedtime  sodium chloride 0.9% lock flush 3 milliLiter(s) IV Push every 8 hours  traZODone 150 milliGRAM(s) Oral at bedtime  valACYclovir 1000 milliGRAM(s) Oral daily    MEDICATIONS  (PRN):  aluminum hydroxide/magnesium hydroxide/simethicone Suspension 30 milliLiter(s) Oral every 12 hours PRN Indigestion  benzocaine/menthol Lozenge 1 Lozenge Oral every 2 hours PRN Sore Throat  diazepam    Tablet 2 milliGRAM(s) Oral every 12 hours PRN MUSCLE SPASM REFRACTORY TO METHOCARBAMOL  magnesium hydroxide Suspension 30 milliLiter(s) Oral every 12 hours PRN Constipation  ondansetron Injectable 4 milliGRAM(s) IV Push every 6 hours PRN Nausea and/or Vomiting  ondansetron Injectable 4 milliGRAM(s) IV Push every 6 hours PRN Nausea and/or Vomiting  oxyCODONE    IR 5 milliGRAM(s) Oral every 3 hours PRN Moderate Pain (4 - 6)  oxyCODONE    IR 10 milliGRAM(s) Oral every 3 hours PRN Severe Pain (7 - 10)

## 2023-03-28 NOTE — DISCHARGE NOTE NURSING/CASE MANAGEMENT/SOCIAL WORK - NSDCPEFALRISK_GEN_ALL_CORE
For information on Fall & Injury Prevention, visit: [email or web address] OR  [email or web address] OR  https://www.cdc.gov/steadi/patient.html

## 2023-03-28 NOTE — PROGRESS NOTE ADULT - SUBJECTIVE AND OBJECTIVE BOX
ORTHO-SPINE POST-OP PROGRESS NOTE:      [id number]    JAMES REIS    STATUS POST:  ACDF C4-C5, C5-C6, C6-C7 for cervical spondylosis and neck pain.    Spondylosis of cervical region without myelopathy or radiculopathy POD #1 Dr. Bhagat    SUBJECTIVE: Patient is a 53yFemale Patient seen and examined. Patient reports of moderate discomfort that is controlled by pain medications. Tolerating oral hydration, encouraged patient to eat breakfast today with small, soft food. + flatulence, + ambulating to void. Patient denies of acute sensory or motor changes. Denies Dysphonia, dysphagia.         I&O's Detail    27 Mar 2023 07:01  -  28 Mar 2023 07:00  --------------------------------------------------------  IN:    Lactated Ringers: 960 mL    Oral Fluid: 230 mL  Total IN: 1190 mL    OUT:    Bulb (mL): 60 mL    Voided (mL): 1200 mL  Total OUT: 1260 mL    Total NET: -70 mL          acetaminophen     Tablet .. 975 milliGRAM(s) Oral every 8 hours  aluminum hydroxide/magnesium hydroxide/simethicone Suspension 30 milliLiter(s) Oral every 12 hours PRN  aspirin enteric coated 325 milliGRAM(s) Oral daily  atorvastatin 20 milliGRAM(s) Oral at bedtime  benzocaine/menthol Lozenge 1 Lozenge Oral every 2 hours PRN  celecoxib 200 milliGRAM(s) Oral every 12 hours  clindamycin IVPB 900 milliGRAM(s) IV Intermittent once  diazepam    Tablet 2 milliGRAM(s) Oral every 12 hours PRN  DULoxetine 60 milliGRAM(s) Oral daily  erythromycin   Ointment 1 Application(s) Right EYE every 4 hours  gabapentin 300 milliGRAM(s) Oral daily  gabapentin 600 milliGRAM(s) Oral at bedtime  lactated ringers. 1000 milliLiter(s) IV Continuous <Continuous>  magnesium hydroxide Suspension 30 milliLiter(s) Oral every 12 hours PRN  methocarbamol 750 milliGRAM(s) Oral every 8 hours  nebivolol 5 milliGRAM(s) Oral daily  ondansetron Injectable 4 milliGRAM(s) IV Push every 6 hours PRN  ondansetron Injectable 4 milliGRAM(s) IV Push every 6 hours PRN  OXcarbazepine 300 milliGRAM(s) Oral two times a day  oxyCODONE    IR 5 milliGRAM(s) Oral every 3 hours PRN  oxyCODONE    IR 10 milliGRAM(s) Oral every 3 hours PRN  pantoprazole    Tablet 40 milliGRAM(s) Oral before breakfast  senna 2 Tablet(s) Oral at bedtime  sodium chloride 0.9% lock flush 3 milliLiter(s) IV Push every 8 hours  traZODone 150 milliGRAM(s) Oral at bedtime  valACYclovir 1000 milliGRAM(s) Oral daily        T(C): 36.8 (03-28-23 @ 05:21), Max: 36.8 (03-28-23 @ 05:21)  HR: 70 (03-28-23 @ 05:21) (58 - 75)  BP: 105/64 (03-28-23 @ 05:21) (104/64 - 128/68)  RR: 18 (03-28-23 @ 05:21) (10 - 21)  SpO2: 98% (03-28-23 @ 05:21) (94% - 99%)      PHYSICAL EXAM:     Constitutional: Alert, responsive, in no acute distress.     Spine:                     Dressing:  clean/dry/intact No bleeding, staining noted.         Drains:  Drain intact, function well. Blood in canister. Drain output 40ml over 12 hours. Drain removed, new dressing placed. Patient tolerated well.            Sensation:          [ ] Upper extremity                ax        mc           m          u          r                                                         R          +           +             +           +          +                                               L           +           +             +           +          +         [ ] Lower extremity             sp         dp         saph       santiago         tibial                                                R          +           +             +           +          +                                               L           +           +             +           +          +                      Motor exam:          [ ] Upper extremity              Bi(c5)  WE(c6)  EE(c7)   FF(c8)                                                R         5/5        5/5        5/5       5/5                                               L          5/5        5/5        5/5       5/5         [ ] Lower extremity          HF(l2)   KE(l3)    TA(l4)   EHL(l5)  GS(s1)                                                 R        5/5        5/5        5/5       5/5         5/5                                               L         5/5        5/5       5/5       5/5          5/5                                                                            Vascular:  + warm well perfused; capillary refill <3 seconds                                                       A/P :  69y Female S/P  ACDF C4-C5, C5-C6, C6-C7 POD# 1  -    Pain control- multimodal approach. Avoid NSAIDS x 6 weeks post op since they may deter fusion.  Focus on muscle relaxers, heat.   -    DVT ppx: [ x]SCDs, early ambulation,  [id number] mg daily x 28 days post op     -    Monitor Drain Output, can discontinue drain when output equal or less than 10 cc/hr/12 hr.  change dressing when drain pulled   -    Resume home meds as appropriate  -PT /OT WBAT, balance and gait  -brace cervical collar with OOB is for comfort and not mandatory, never to be worn in bed, with eating or hygeine but should be worn when in a motor vehicle x 28 days post op.   - Likely D/c home today pending medical and PT clearance

## 2023-03-28 NOTE — OCCUPATIONAL THERAPY INITIAL EVALUATION ADULT - RANGE OF MOTION EXAMINATION, UPPER EXTREMITY
except bilat shoulders to 90 degrees due to c-spine precautions/bilateral UE Active ROM was WFL  (within functional limits)

## 2023-03-28 NOTE — PROGRESS NOTE ADULT - ASSESSMENT
52 y/o F with Hx of HLD, pre DM, celiac disease, HTN, anxiety and depression, she has posterior neck radiating to left shoulder for about 2 years. Pain is becoming progressively worse, completed cortisone injections previously with the last one 2022 with no relief, She has been under physical therapy, chiropractic, pain management without relief, came in here for elective anterior cervical discectomy fusion C4-C5, C5-C6, C6-C& on 3/27/2023 s/p procedure.     Plan:     Neck pain s/p anterior cervical discectomy fusion C4-C5, C5-C6 post op day 01:     - VS stable  - abx per ortho   - opiate induced constipation regimen   - encouraging incentive spirometry   -c/w local wound care per ortho   -DVT prophylaxis and Pain meds as per Ortho team   -PT/OT and weight bearing per ortho   - methocarbamol 750 mg 3 times a day, As Needed -for muscle spasm   -gabapentin 600 mg oral tablet: orally once a day (at bedtime) and 300 in the morning    Depression/ PTSD: On Cymbalta 60 mg 2 times a day, trazodone 150 mg once a day and Oxcarbazepine 300 mg 2 times a day    HTN: On nebivolol 5 mg once a day with holding parameters     HLD: Atorvastatin 20 mg once a day.     Hx of Constipation: On Motegrity 1 mg once a day    GERD: On Esomeprazole 20 mg once a day    Hx of Herpes: On Valtrex 1 g oral tablet: Last Dose Taken:  , orally once a day    local rashes due to Tegaderm: will give antihistamine, she denies any itching, sob, once her rashes are better then would be ok from the medicine point of view.

## 2023-03-28 NOTE — DISCHARGE NOTE NURSING/CASE MANAGEMENT/SOCIAL WORK - PATIENT PORTAL LINK FT
You can access the FollowMyHealth Patient Portal offered by [facility] by registering at the following website: [email or web address]. By joining [employer]’s FollowMyHealth portal, you will also be able to view your health information using other applications (apps) compatible with our system.

## 2023-03-29 ENCOUNTER — NON-APPOINTMENT (OUTPATIENT)
Age: 53
End: 2023-03-29

## 2023-04-02 ENCOUNTER — EMERGENCY (EMERGENCY)
Facility: HOSPITAL | Age: 53
LOS: 1 days | Discharge: HOME | End: 2023-04-02
Attending: EMERGENCY MEDICINE
Payer: MEDICARE

## 2023-04-02 VITALS
DIASTOLIC BLOOD PRESSURE: 82 MMHG | TEMPERATURE: 99 F | HEIGHT: 63 IN | RESPIRATION RATE: 18 BRPM | HEART RATE: 75 BPM | SYSTOLIC BLOOD PRESSURE: 135 MMHG | WEIGHT: 186.95 LBS | OXYGEN SATURATION: 98 %

## 2023-04-02 DIAGNOSIS — Z98.1 ARTHRODESIS STATUS: Chronic | ICD-10-CM

## 2023-04-02 DIAGNOSIS — Z90.711 ACQUIRED ABSENCE OF UTERUS WITH REMAINING CERVICAL STUMP: Chronic | ICD-10-CM

## 2023-04-02 PROCEDURE — 99284 EMERGENCY DEPT VISIT MOD MDM: CPT

## 2023-04-02 RX ORDER — HYDROXYZINE HCL 10 MG
1 TABLET ORAL
Qty: 15 | Refills: 0
Start: 2023-04-02 | End: 2023-04-06

## 2023-04-02 RX ORDER — HYDROCORTISONE 1 %
1 OINTMENT (GRAM) TOPICAL ONCE
Refills: 0 | Status: COMPLETED | OUTPATIENT
Start: 2023-04-02 | End: 2023-04-02

## 2023-04-02 RX ORDER — HYDROXYZINE HCL 10 MG
25 TABLET ORAL ONCE
Refills: 0 | Status: COMPLETED | OUTPATIENT
Start: 2023-04-02 | End: 2023-04-02

## 2023-04-02 RX ADMIN — Medication 25 MILLIGRAM(S): at 17:40

## 2023-04-02 NOTE — ED STATDOCS - ATTENDING APP SHARED VISIT CONTRIBUTION OF CARE
I, Reina Castaneda, performed the initial face to face bedside interview with this patient regarding history of present illness, review of symptoms and relevant past medical, social and family history.  I completed an independent physical examination.  I was the initial provider who evaluated this patient. I have signed out the follow up of any pending tests (i.e. labs, radiological studies) to the ACP.  I have communicated the patient’s plan of care and disposition with the ACP.  The history, relevant review of systems, past medical and surgical history, medical decision making, and physical examination was documented by the scribe in my presence and I attest to the accuracy of the documentation.

## 2023-04-02 NOTE — ED ADULT NURSE NOTE - CHIEF COMPLAINT QUOTE
pt with neck surgery on Monday March 27th. Has been developing a rash next to incision site.  Redness noted to ngoc, pt reports itching.  denies drainage, SOB.

## 2023-04-02 NOTE — ED STATDOCS - NS ED ATTENDING STATEMENT MOD
This was a shared visit with the KASI. I reviewed and verified the documentation and independently performed the documented:

## 2023-04-02 NOTE — ED STATDOCS - PATIENT PORTAL LINK FT
You can access the FollowMyHealth Patient Portal offered by [facility] by registering at the following website: [email or web address]. By joining [employer]’s FollowMyHealth portal, you will also be able to view your health information using other applications (apps) compatible with our system.

## 2023-04-02 NOTE — PROGRESS NOTE ADULT - SUBJECTIVE AND OBJECTIVE BOX
Ortho Post Op Check    Name: JAMES REIS    MR #: [id number]    Procedure: Anterior cervical discectomy and fusion C4-7 on 3/27/23  Surgeon: Dr Bhagat    Pt presents to ER due to having a rash on her neck in the area of where the bandage/ tegaderm was. Patient states she took the bandage off due to extreme itch. She took benadryl and applied OTC cortisone cream on the area. She states she did not get anything near the incision and the steri strips remain in place.   Denies CP, SOB, N/V, numbness/tingling               General Exam:  Vital Signs Last 24 Hrs  T(C): 37 (04-02-23 @ 15:57), Max: 37 (04-02-23 @ 15:57)  T(F): 98.6 (04-02-23 @ 15:57), Max: 98.6 (04-02-23 @ 15:57)  HR: 75 (04-02-23 @ 15:57) (75 - 75)  BP: 135/82 (04-02-23 @ 15:57) (135/82 - 135/82)  BP(mean): --  RR: 18 (04-02-23 @ 15:57) (18 - 18)  SpO2: 98% (04-02-23 @ 15:57) (98% - 98%)    General: Pt Alert and oriented, NAD, controlled pain.  Steri strips were removed to reveal incision to remain C/D/I. No bleeding.  There is diffuse redness and obvious skin irritation. No signs of infection, no drainage from incision.   Sensation: Grossly intact to light touch without deficit.  Motor: +5/5 B/L UE            A/P: 53yFemale POD#6 s/p Anterior cervical discectomy and fusion C4-7  - Appears patient is likely having a dermatitis type allergic reaction to the bandage/adhesive  - We cleansed the area with adhesive remover and then rinsed with saline  - Patient will receive Atarax and DC to home   - Patient has a scheduled appt on thursday 4/6/23 w Dr Bhagat  - Patient advised to keep appt but to call office tuesday morning if there continues to be no improvement

## 2023-04-02 NOTE — ED STATDOCS - OBJECTIVE STATEMENT
52 y/o female w/ recent removal of cervical osteophytes 4 days ago now c/o redness and irritation to the site of incision. Pt reports three days following procedure, felt irritation from surgical tape used to cover site of incision. States received Benadryl for symptoms, but did not help. Pt pharmacy: [facility] in [facility]

## 2023-04-02 NOTE — ED STATDOCS - CLINICAL SUMMARY MEDICAL DECISION MAKING FREE TEXT BOX
Post op day 4 removal of cervical osteophytes. Has rash where tape from dressing was. Will give antihistamine and steroid cream for symptomatic relief. Post op day 4 removal of cervical osteophytes. Has rash where tape from dressing was. Will give antihistamine and steroid cream for symptomatic relief.    NADIRA Camara @ 17:45-- Atarax sent to pharmacy. Pt stable for d/c.

## 2023-04-03 PROBLEM — Z87.19 PERSONAL HISTORY OF OTHER DISEASES OF THE DIGESTIVE SYSTEM: Chronic | Status: ACTIVE | Noted: 2023-03-07

## 2023-04-03 PROBLEM — Z91.89 OTHER SPECIFIED PERSONAL RISK FACTORS, NOT ELSEWHERE CLASSIFIED: Chronic | Status: ACTIVE | Noted: 2023-03-07

## 2023-04-06 ENCOUNTER — APPOINTMENT (OUTPATIENT)
Dept: ORTHOPEDIC SURGERY | Facility: CLINIC | Age: 53
End: 2023-04-06
Payer: MEDICARE

## 2023-04-06 PROCEDURE — 72040 X-RAY EXAM NECK SPINE 2-3 VW: CPT

## 2023-04-06 PROCEDURE — 99024 POSTOP FOLLOW-UP VISIT: CPT

## 2023-04-06 RX ORDER — SULFAMETHOXAZOLE AND TRIMETHOPRIM 800; 160 MG/1; MG/1
800-160 TABLET ORAL TWICE DAILY
Qty: 14 | Refills: 0 | Status: ACTIVE | COMMUNITY
Start: 2023-04-06 | End: 1900-01-01

## 2023-04-06 NOTE — HISTORY OF PRESENT ILLNESS
[3] : the patient reports pain that is 3/10 in severity [Constipation] : no constipation [Diarrhea] : no diarrhea [Dysuria] : no dysuria [Fever] : no fever [Nausea] : no nausea [Vomiting] : no vomiting [de-identified] :  Anterior cervical diskectomy and osteophytectomy at C4-C5, C5-C6, C6-C7.\par  Placement of interbody biomechanical graft with arthrodesis at C4-C5, C5-C6, C6-C7.\par  Placement of an anterior cervical plate, non-integral across three motion segments at C4-C5, C5-C6, C6-C7.\par DOS: 03/27/2023 [de-identified] : Status post 3 level ACDF.  She had trouble swallowing but it is getting better with time.  She also has some posterior cervical spasm which is to be expected and she understands this.  She has no weakness of the upper extremities.  She did have a reaction/contact dermatitis to her postop bandages, she has been taking care of this with sulfa/Silvadene cream around the rash area and rash has been resolving well.  Also taking hydroxyzine for itch.  She has questions about her incision, feels it is very raised.  Denies fever chills night sweats decreased appetite.  He is not taking any oxycodone tablets in 1 week.  Overall she is happy with the outcome of her surgery. [de-identified] : Constitutional: Nontoxic in appearance.  She is accompanied by her .  Ambulating freely without any distant device.\par Skin: Mild erythema ezequiel-incisional Arthur consistent with contact dermatitis.  There is no discharge or weeping or breakdown of her incision which is clean dry and intact.\par Neurologic: No focal deficit upper and lower extremities\par musculo skeletal: Positive deltoid trapezius myositis, no focal weakness upper or lower extremities manually [de-identified] : X-ray of the cervical spine preserved ACDF construct 3 levels [de-identified] : Status post 3 level ACDF doing well.  Contact dermatitis ezequiel-incisional, doing well. [de-identified] : Vision was cleansed with ChloraPrep.  Incision was fortified with Dermabond.  No Steri-Strips were dressing was placed because patient is having an issue with contact dermatitis.  She can shower from the front.  Bactrim DS 1 tablet twice a day for 7 days was prescribed prophylactically.  Continue Atarax 3 times a day as needed itch.  Continue methocarbamol 750 mg p.o. 3 times daily as needed spasm and can decrease dose once pain is controlled.  Tylenol extra strength 2 tablets 2-3 times daily as needed.  She is already stopped Valium and oxycodone.  She can discontinue her cervical collar as needed except when she is being driven in a car for 28 days postop.  Aspirin 325 mg can be discontinued when she is ambulating very well on a consistent basis.  He can continue Silvadene cream ezequiel-incisional he twice daily as needed.  We will follow-up in 3 weeks or as needed.

## 2023-04-28 ENCOUNTER — APPOINTMENT (OUTPATIENT)
Dept: ORTHOPEDIC SURGERY | Facility: CLINIC | Age: 53
End: 2023-04-28
Payer: MEDICARE

## 2023-04-28 PROCEDURE — 72040 X-RAY EXAM NECK SPINE 2-3 VW: CPT

## 2023-04-28 PROCEDURE — 99024 POSTOP FOLLOW-UP VISIT: CPT

## 2023-06-08 ENCOUNTER — APPOINTMENT (OUTPATIENT)
Dept: ORTHOPEDIC SURGERY | Facility: CLINIC | Age: 53
End: 2023-06-08
Payer: MEDICARE

## 2023-06-08 PROCEDURE — 72040 X-RAY EXAM NECK SPINE 2-3 VW: CPT

## 2023-06-08 PROCEDURE — 99024 POSTOP FOLLOW-UP VISIT: CPT

## 2023-06-08 NOTE — HISTORY OF PRESENT ILLNESS
[___ Weeks Post Op] : [unfilled] weeks post op [1] : the patient reports pain that is 1/10 in severity [Xray (Date:___)] : [unfilled] Xray -  [Clean/Dry/Intact] : clean, dry and intact [Healed] : healed [Chills] : no chills [Fever] : no fever [Erythema] : not erythematous [Discharge] : absent of discharge [Swelling] : not swollen [Dehiscence] : not dehisced [de-identified] : s/p  Anterior cervical diskectomy and osteophytectomy at C4-C5, C5-C6, C6-C7.\par  Placement of interbody biomechanical graft with arthrodesis at C4-C5, C5-C6, C6-C7.\par  Placement of an anterior cervical plate, non-integral across three motion segments at C4-C5, C5-C6, C6-C7. DOS: 03/27/23 [de-identified] : This is a 53 year old female who presents for a 10 week follow up for a multi level ACDF. Patient states neck pain and upper extremity weakness have resolved but she continues to have pain in the trap region bilaterally. Its worse upon waking up in the morning and is tender to touch. She takes advil and tylenol which alleviate her symptoms temporarily. Patient states she has difficulty swallowing pills but it has gotten better over the past several weeks.  [de-identified] : Neurologic: No focal deficit upper and lower extremities\par musculo skeletal: Positive deltoid trapezius myositis bilaterally. no focal weakness upper or lower extremities manually. \par  [de-identified] : X-rays obtained on today's date demonstrated 3 level ACDF well-positioned.  Visibly appropriate implant positioning. [de-identified] : This is a 53 year old female who presents for a 10 weeks post op visit following a multilevel ACDF. Patient states she is happy with the resolution of neck pain and arm weakness but feels her trap pain is affecting her every day life. Patient was educated that this is a common finding after ACDF due to increased height of vertebral column. Patient will continue at home stretching exercises and taking tylenol and advil for symptom relief.  [de-identified] : Patient is going to follow-up in 2 months with an add Toradol for some pain control she states she responds well to ibuprofen this may be advantageous we discussed home stretching aerobic conditioning core strengthening topical modalities

## 2023-06-12 ENCOUNTER — APPOINTMENT (OUTPATIENT)
Dept: INTERNAL MEDICINE | Facility: CLINIC | Age: 53
End: 2023-06-12

## 2023-06-26 ENCOUNTER — RX RENEWAL (OUTPATIENT)
Age: 53
End: 2023-06-26

## 2023-07-14 ENCOUNTER — APPOINTMENT (OUTPATIENT)
Dept: INTERNAL MEDICINE | Facility: CLINIC | Age: 53
End: 2023-07-14
Payer: MEDICARE

## 2023-07-14 VITALS
SYSTOLIC BLOOD PRESSURE: 120 MMHG | DIASTOLIC BLOOD PRESSURE: 60 MMHG | RESPIRATION RATE: 16 BRPM | HEART RATE: 98 BPM | TEMPERATURE: 98 F | HEIGHT: 63 IN | WEIGHT: 193 LBS | BODY MASS INDEX: 34.2 KG/M2

## 2023-07-14 PROCEDURE — 99213 OFFICE O/P EST LOW 20 MIN: CPT

## 2023-07-14 NOTE — PHYSICAL EXAM
[No Acute Distress] : no acute distress [Normal Oropharynx] : the oropharynx was normal [Normal TMs] : both tympanic membranes were normal [No Respiratory Distress] : no respiratory distress  [No Accessory Muscle Use] : no accessory muscle use [Clear to Auscultation] : lungs were clear to auscultation bilaterally [Normal Rate] : normal rate  [Regular Rhythm] : with a regular rhythm [Normal S1, S2] : normal S1 and S2 [No Edema] : there was no peripheral edema [Soft] : abdomen soft [Non-distended] : non-distended [Normal Bowel Sounds] : normal bowel sounds [Coordination Grossly Intact] : coordination grossly intact [No Focal Deficits] : no focal deficits [Normal Gait] : normal gait [Normal Affect] : the affect was normal [Alert and Oriented x3] : oriented to person, place, and time [de-identified] : +CVA tenderness, reproducible pain on palpation B/L lower back [de-identified] : Diminished strength B/L upper extremity

## 2023-07-14 NOTE — HISTORY OF PRESENT ILLNESS
[FreeTextEntry1] : BP check [de-identified] : Patient is a 52- year- old female with PMH HLD, HTN, depression, FM, Gerd, Pre-DM, Lumbar DDD, H/O Spinal fusion L5 S1 presents to office today for BP check.\par \par The patient states she is enrolled in daily BP monitoring program through her pain management doctors office and was notified that she has been obtaining elevated readings the past few weeks. The patient takes 5mg bystolic daily and has never had an issues with her BP.\par \par The patient sometimes feels flushed with elevated readings, otherwise asymptomatic.\par \par The patients BP noted to be normal at todays visit.

## 2023-07-14 NOTE — PLAN
[FreeTextEntry1] : 1. The patients BP noted to be WNL at todays visit. I  have advised patient to ensure BP machine at home is accurate. We discussed the correct way to take a bp. I recommended for patient to ensure machine has fresh batteries.\par \par 2. The patient will bring her machine to pain management office and compare readings with manual reading. If they correlate, the patient will return to my office. We will consider increasing Bystolic to 10mg qd if necessary. \par \par 3. I have referred patient to endocrinology/ weight management for initiation of GLP medication \par \par 4. F/U 6 mo or sooner if needed

## 2023-08-08 ENCOUNTER — APPOINTMENT (OUTPATIENT)
Dept: ORTHOPEDIC SURGERY | Facility: CLINIC | Age: 53
End: 2023-08-08
Payer: MEDICARE

## 2023-08-08 VITALS
DIASTOLIC BLOOD PRESSURE: 80 MMHG | HEART RATE: 85 BPM | SYSTOLIC BLOOD PRESSURE: 132 MMHG | HEIGHT: 63 IN | BODY MASS INDEX: 34.2 KG/M2 | WEIGHT: 193 LBS

## 2023-08-08 DIAGNOSIS — M75.81 OTHER SHOULDER LESIONS, RIGHT SHOULDER: ICD-10-CM

## 2023-08-08 DIAGNOSIS — F32.A DEPRESSION, UNSPECIFIED: ICD-10-CM

## 2023-08-08 PROCEDURE — 99214 OFFICE O/P EST MOD 30 MIN: CPT | Mod: 25

## 2023-08-08 PROCEDURE — 96372 THER/PROPH/DIAG INJ SC/IM: CPT | Mod: RT

## 2023-08-08 PROCEDURE — 72040 X-RAY EXAM NECK SPINE 2-3 VW: CPT

## 2023-08-08 NOTE — HISTORY OF PRESENT ILLNESS
[de-identified] :  Placement of an anterior cervical plate, non-integral across three motion segments at C4-C5, C5-C6, C6-C7. DOS: 03/27/23. Chelsie states that approximately 1 month ago she started to have focal right shoulder pain with abduction and flexion.  She also has tingling numbness burning of the left forearm.  Pain intermittently radiates from her neck to her forearm.  DEE questionnaire is negative she is not having difficulty with fine motor skills or penmanship not having ataxia/falling.  She is having posterior cervicalgia as well.  She visited her pain management provider who ordered a cervical MRI and EMGs which will be done within the next 2 weeks.  Pain level at worst is 8 out of 10 at best is 4 out of 10.  She was given a Medrol Dosepak from her pain management provider states it did not work significantly to relieve her pain.  She is taking gabapentin with mild relief.

## 2023-08-08 NOTE — DISCUSSION/SUMMARY
[Medication Risks Reviewed] : Medication risks reviewed [4 Weeks] : in 4 weeks [de-identified] : 30 minutes was spent reviewing the x-rays as well as discussing with the patient their clinical presentation, diagnosis and providing education.  Conservative treatment was discussed with the patient at length. Anticipatory guidance regarding disease process cervical spondylosis, status post cervical fusion, right shoulder tendinopathy and bursitis, left carpal tunnel syndrome medial cubital tunnel syndrome, avoidance of acute exacerbation this was discussed at length and all patients commenting concerns were answered to the patient's satisfaction. Physical therapy for decrease pain and increase function was ordered. Patient was given home exercises as approved by [employer] and works well held directed toward this particular process. Intermittent use of acetaminophen 500 mg 2 tablets t.i.d. p.r.n. mild to moderate pain, repeat Medrol Dosepak for anti-inflammatory properties and increase gabapentin to 800 mg p.o. at bedtime, continue 300 mg p.o. in the morning.  Patient did well after therapeutic Depo-Medrol injection.  Avoid NSAIDs due to SSRI.  Home exercise including stretching on a daily basis for 20-30 minutes was recommended. Heat, ice, topical were discussed as needed.  Follow-up after MRI and EMG are completed.  If right shoulder pain continues despite current treatment plan consider referral to upper extremity specialty.

## 2023-08-08 NOTE — PROCEDURE
[de-identified] : Depo-Medrol 40 mg mg injection was given intramuscularly in the right buttock after cleansing with alcohol, Ethyl chloride for local anesthetic was given.  Ketorolac was drawn up with an 18-gauge needle, needle change and 22-gauge needle was used for intramuscular injection under sterile precautions.  Patient tolerated procedure well with no adverse effects. Dry sterile dressing was placed.

## 2023-08-08 NOTE — PHYSICAL EXAM
[de-identified] : CONSTITUTIONAL:  Patient is a very pleasant individual who is well-nourished and appears stated age.  PSYCHIATRIC:  Alert and oriented times three and in no apparent distress, and participates with orthopedic evaluation well. HEAD:  Atraumatic and  nonsyndromic in appearance. EENT: No thyromegaly, EOMI. RESPIRATORY:  Respiratory rate is regular, not dyspneic on examination. LYMPHATICS:  There is no cervical or axillary lymphadenopathy. INTEGUMENTARY:  Skin is clean, dry, and intact about the bilateral upper extremities and cervical spine.  VASCULAR:   There is brisk capillary refill about the bilateral upper extremities and radial pulses are 2/4.  NEUROLOGIC:  Negative L'hirmitte, negative Spurling's sign. There are no pathologic reflexes. There is no decrease in sensation of the bilateral upper extremities on Wartenberg pinwheel examination.  Deep tendon reflexes are well-maintained at +2/4 of the bilateral upper extremities and are symmetric. MUSCULOSKELETAL:  There is no visible muscular atrophy.  Manual motor strength is well maintained in the bilateral upper extremities.  Cervical range of motion is well maintained.  The patient ambulates in a non-myelopathic manner.  Positive focal right shoulder exam, pain on abduction and flexion, subacromial bursitis type findings, shoulder tendinopathy type findings, positive sign Schroeder sign.  Positive carpal tunnel type findings on the left as well as medial cubital tunnels findings on the left.  Positive posterior cervical myositis.   [de-identified] : X-ray of the cervical spine has been reviewed on today's date shows status post 3 level ACDF visibly implants appear to be well-positioned.

## 2023-08-24 ENCOUNTER — RX RENEWAL (OUTPATIENT)
Age: 53
End: 2023-08-24

## 2023-08-25 ENCOUNTER — APPOINTMENT (OUTPATIENT)
Dept: ORTHOPEDIC SURGERY | Facility: CLINIC | Age: 53
End: 2023-08-25
Payer: MEDICARE

## 2023-08-25 VITALS
BODY MASS INDEX: 34.2 KG/M2 | HEIGHT: 63 IN | WEIGHT: 193 LBS | HEART RATE: 82 BPM | SYSTOLIC BLOOD PRESSURE: 132 MMHG | DIASTOLIC BLOOD PRESSURE: 86 MMHG

## 2023-08-25 DIAGNOSIS — Z98.1 ARTHRODESIS STATUS: ICD-10-CM

## 2023-08-25 PROCEDURE — 99214 OFFICE O/P EST MOD 30 MIN: CPT

## 2023-08-25 PROCEDURE — 72040 X-RAY EXAM NECK SPINE 2-3 VW: CPT

## 2023-08-25 NOTE — PHYSICAL EXAM
[Antalgic] : antalgic [de-identified] : CONSTITUTIONAL:  Patient is a very pleasant individual who is well-nourished and appears stated age.  PSYCHIATRIC:  Alert and oriented times three and in no apparent distress, and participates with orthopedic evaluation well. HEAD:  Atraumatic and  nonsyndromic in appearance. EENT: No thyromegaly, EOMI. RESPIRATORY:  Respiratory rate is regular, not dyspneic on examination. LYMPHATICS:  There is no cervical or axillary lymphadenopathy. INTEGUMENTARY:  Skin is clean, dry, and intact about the bilateral upper extremities and cervical spine.  Anterior cervical incision is well-healed VASCULAR:   There is brisk capillary refill about the bilateral upper extremities and radial pulses are 2/4.  NEUROLOGIC: Positive L'hirmitte, positive Spurling's sign. There are no pathologic reflexes. There is a decrease in sensation of the left greater than right upper extremities on Wartenberg pinwheel examination and light touch consistent with a distal distribution such as C7 and C8.  Deep tendon reflexes are well-maintained at +2/4 of the bilateral upper extremities and are symmetric. MUSCULOSKELETAL:  There is no visible muscular atrophy.  Manual motor strength is well maintained in the bilateral upper extremities apprehensive.  Cervical range of motion is well maintained to approximately 50% secondary to apprehension and guarding.  The patient ambulates in a non-myelopathic manner. Normal secondary orthopaedic exam of bilateral shoulders, elbows and hands.  Elbow flexion and extension, wrist extension, finger flexion and abduction are well maintained.    [de-identified] : 2 views of the cervical spine reviewed on today's date August 25, 2023 3 level ACDF implants are well-positioned.

## 2023-08-25 NOTE — DISCUSSION/SUMMARY
[de-identified] : Pain management physician was unsuccessful obtaining MRIs or EMGs.  New MRI is going to be ordered of the cervical spine patient is 5 months status post 3 level ACDF with worsening cervical pain worsening left upper extremity radiculopathy patient will follow-up immediately after her cervical MRI for further clinical decision making with regard to possible adjacent segment disease revision surgery versus epidural injections

## 2023-08-25 NOTE — HISTORY OF PRESENT ILLNESS
[de-identified] : Kelly is well-known to our practice for a lumbar fusion and a 3 level ACDF she presents for follow-up to essentially worsening left upper extremity radiculopathy patient states her pain management doctor was unable to get the MRI and her EMGs approved she presents for an MRI prescription.  Patient states she is worsening steroids or noneffective methocarbamol is not effective and she states she is progressively getting worse [Worsening] : worsening [10] : a maximum pain level of 10/10 [Constant] : ~He/She~ states the symptoms seem to be constant [Bending] : worsened by bending [Lifting] : worsened by lifting [None] : No relieving factors are noted [Ataxia] : no ataxia [Incontinence] : no incontinence [Loss of Dexterity] : good dexterity [Urinary Ret.] : no urinary retention

## 2023-08-29 ENCOUNTER — RX RENEWAL (OUTPATIENT)
Age: 53
End: 2023-08-29

## 2023-08-31 ENCOUNTER — APPOINTMENT (OUTPATIENT)
Dept: MRI IMAGING | Facility: CLINIC | Age: 53
End: 2023-08-31

## 2023-09-13 ENCOUNTER — OUTPATIENT (OUTPATIENT)
Dept: OUTPATIENT SERVICES | Facility: HOSPITAL | Age: 53
LOS: 1 days | End: 2023-09-13
Payer: MEDICARE

## 2023-09-13 ENCOUNTER — APPOINTMENT (OUTPATIENT)
Dept: MRI IMAGING | Facility: CLINIC | Age: 53
End: 2023-09-13
Payer: MEDICARE

## 2023-09-13 DIAGNOSIS — Z90.711 ACQUIRED ABSENCE OF UTERUS WITH REMAINING CERVICAL STUMP: Chronic | ICD-10-CM

## 2023-09-13 DIAGNOSIS — M47.812 SPONDYLOSIS WITHOUT MYELOPATHY OR RADICULOPATHY, CERVICAL REGION: ICD-10-CM

## 2023-09-13 DIAGNOSIS — Z98.1 ARTHRODESIS STATUS: Chronic | ICD-10-CM

## 2023-09-13 PROCEDURE — 72141 MRI NECK SPINE W/O DYE: CPT

## 2023-09-13 PROCEDURE — 72141 MRI NECK SPINE W/O DYE: CPT | Mod: 26

## 2023-09-14 ENCOUNTER — APPOINTMENT (OUTPATIENT)
Dept: INTERNAL MEDICINE | Facility: CLINIC | Age: 53
End: 2023-09-14
Payer: MEDICARE

## 2023-09-14 VITALS
OXYGEN SATURATION: 94 % | TEMPERATURE: 97.6 F | HEIGHT: 63 IN | HEART RATE: 84 BPM | WEIGHT: 193 LBS | SYSTOLIC BLOOD PRESSURE: 100 MMHG | BODY MASS INDEX: 34.2 KG/M2 | RESPIRATION RATE: 16 BRPM | DIASTOLIC BLOOD PRESSURE: 80 MMHG

## 2023-09-14 DIAGNOSIS — I10 ESSENTIAL (PRIMARY) HYPERTENSION: ICD-10-CM

## 2023-09-14 PROCEDURE — 99213 OFFICE O/P EST LOW 20 MIN: CPT

## 2023-09-20 ENCOUNTER — RX RENEWAL (OUTPATIENT)
Age: 53
End: 2023-09-20

## 2023-10-05 ENCOUNTER — APPOINTMENT (OUTPATIENT)
Dept: ORTHOPEDIC SURGERY | Facility: CLINIC | Age: 53
End: 2023-10-05
Payer: MEDICARE

## 2023-10-05 VITALS
WEIGHT: 193 LBS | HEART RATE: 82 BPM | SYSTOLIC BLOOD PRESSURE: 105 MMHG | DIASTOLIC BLOOD PRESSURE: 75 MMHG | BODY MASS INDEX: 34.2 KG/M2 | TEMPERATURE: 98.1 F | HEIGHT: 63 IN

## 2023-10-05 DIAGNOSIS — M60.9 MYOSITIS, UNSPECIFIED: ICD-10-CM

## 2023-10-05 DIAGNOSIS — M47.812 SPONDYLOSIS W/OUT MYELOPATHY OR RADICULOPATHY, CERVICAL REGION: ICD-10-CM

## 2023-10-05 PROCEDURE — 72040 X-RAY EXAM NECK SPINE 2-3 VW: CPT

## 2023-10-05 PROCEDURE — 99214 OFFICE O/P EST MOD 30 MIN: CPT

## 2023-10-09 NOTE — DATA REVIEWED
[FreeTextEntry1] : EKG NSR 63 BPM wnl
4-calculated by average/Not able to assess (calculate score using [facility] averaging method)

## 2023-10-12 ENCOUNTER — RX RENEWAL (OUTPATIENT)
Age: 53
End: 2023-10-12

## 2023-10-12 ENCOUNTER — APPOINTMENT (OUTPATIENT)
Dept: ORTHOPEDIC SURGERY | Facility: CLINIC | Age: 53
End: 2023-10-12
Payer: MEDICARE

## 2023-10-12 VITALS
HEART RATE: 68 BPM | SYSTOLIC BLOOD PRESSURE: 146 MMHG | DIASTOLIC BLOOD PRESSURE: 89 MMHG | WEIGHT: 193 LBS | BODY MASS INDEX: 34.2 KG/M2 | HEIGHT: 63 IN

## 2023-10-12 PROCEDURE — 20550 NJX 1 TENDON SHEATH/LIGAMENT: CPT | Mod: 59,LT

## 2023-10-12 PROCEDURE — 20610 DRAIN/INJ JOINT/BURSA W/O US: CPT | Mod: LT

## 2023-10-12 PROCEDURE — 20526 THER INJECTION CARP TUNNEL: CPT | Mod: 59,LT

## 2023-10-12 PROCEDURE — 99214 OFFICE O/P EST MOD 30 MIN: CPT | Mod: 25

## 2023-10-12 PROCEDURE — 73030 X-RAY EXAM OF SHOULDER: CPT | Mod: RT

## 2023-11-10 ENCOUNTER — APPOINTMENT (OUTPATIENT)
Dept: CARDIOLOGY | Facility: CLINIC | Age: 53
End: 2023-11-10

## 2023-12-05 ENCOUNTER — APPOINTMENT (OUTPATIENT)
Dept: ORTHOPEDIC SURGERY | Facility: CLINIC | Age: 53
End: 2023-12-05

## 2023-12-08 NOTE — PATIENT PROFILE ADULT - DO YOU FEEL LIKE HURTING YOURSELF OR OTHERS?
Render Post-Care Instructions In Note?: no Detail Level: Detailed Consent: The patient's consent was obtained including but not limited to risks of crusting, scabbing, blistering, scarring, darker or lighter pigmentary change, recurrence, incomplete removal and infection. Application Tool (Optional): Cry-AC no Show Aperture Variable?: Yes Duration Of Freeze Thaw-Cycle (Seconds): 3 Post-Care Instructions: I reviewed with the patient in detail post-care instructions. Patient is to wear sunprotection, and avoid picking at any of the treated lesions. Pt may apply Vaseline to crusted or scabbing areas. Number Of Freeze-Thaw Cycles: 2 freeze-thaw cycles

## 2023-12-21 ENCOUNTER — APPOINTMENT (OUTPATIENT)
Dept: ORTHOPEDIC SURGERY | Facility: CLINIC | Age: 53
End: 2023-12-21
Payer: MEDICARE

## 2023-12-21 DIAGNOSIS — G56.02 CARPAL TUNNEL SYNDROME, LEFT UPPER LIMB: ICD-10-CM

## 2023-12-21 DIAGNOSIS — M75.90 BURSITIS OF UNSPECIFIED SHOULDER: ICD-10-CM

## 2023-12-21 DIAGNOSIS — M75.50 BURSITIS OF UNSPECIFIED SHOULDER: ICD-10-CM

## 2023-12-21 DIAGNOSIS — M65.30 TRIGGER FINGER, UNSPECIFIED FINGER: ICD-10-CM

## 2023-12-21 DIAGNOSIS — M25.512 PAIN IN LEFT SHOULDER: ICD-10-CM

## 2023-12-21 DIAGNOSIS — M77.10 LATERAL EPICONDYLITIS, UNSPECIFIED ELBOW: ICD-10-CM

## 2023-12-21 PROCEDURE — 20600 DRAIN/INJ JOINT/BURSA W/O US: CPT | Mod: 59,FA

## 2023-12-21 PROCEDURE — 20526 THER INJECTION CARP TUNNEL: CPT | Mod: LT

## 2023-12-21 PROCEDURE — 99214 OFFICE O/P EST MOD 30 MIN: CPT | Mod: 25

## 2023-12-21 NOTE — HISTORY OF PRESENT ILLNESS
[FreeTextEntry1] : Chelsie is a very pleasant 53-year-old female who presents today with a greater than 1 year history of worsening left shoulder pain, left elbow pain and left wrist and hand discomfort numbness and tingling and burning where she has to shake her hands.  She has difficulty with activities of daily living.  Injections have been beneficial as well as activity modification however symptoms persist

## 2023-12-21 NOTE — PHYSICAL EXAM
[de-identified] : Examination of the [left] shoulder reveals equal active and passive motion as compared to the contralateral side There is a positive Speed, positive Mahogany, positive Schroeder, tenderness with anterior shoulder compression Examination of the [left] elbow reveals tenderness at the level of the lateral epicondyle. There is pain with resisted wrist and finger extension at the elbow. Examination of the [left] wrist reveals discomfort with compression at the level of the volar carpal tunnel eliciting numbness/tingling throughout the fingertips Examination of the hand(s)  particularly at the A1 of the left thumb reveals tenderness with a palpable click.   [de-identified] : [4] views of [right] shoulder were reviewed today in my office and were seen by me and discussed with the patient.  These [show findings consistent with AC arthropathy and grossly preserved glenohumeral joint space] There is a lateral sided calcification of the cuff insertion

## 2023-12-21 NOTE — ASSESSMENT
[FreeTextEntry1] : ASSESSMENT: The patient comes in today with multiple chronic severely exacerbated symptoms including left shoulder tendinopathy impingement weakness.  We have discussed physical therapy. For her left elbow lateral epicondylitis i.e. tennis elbow discomfort she also elects for an injection and PT.  For her severe left carpal tunnel symptoms we have discussed bracing and she elects for an injection in hopes of nonop improvement.   The patient was adequately and thoroughly informed of my assessment of their current condition(s).  - This may diminish bodily function for the extremity. We discussed prognosis, treatment modalities including operative and nonoperative options for the above diagnostic assessment. For this, when accessible, I was able to review other physicians note(s) including reviewing other tests, imaging results as well as personally view these results for my own interpretation.   Specifically counseled regarding the signs and symptoms of potential infection and instructed to present promptly to clinic or hospital if such signs and symptoms arise. Injection:  The risks and benefits of a steroid injection were discussed in detail. The risks include but are not limited to: pain, infection, swelling, flare response, bleeding, subcutaneous fat atrophy, skin depigmentation and/or elevation of blood sugar. The risk of incomplete resolution of symptoms, recurrence and additional intervention was reviewed and considered by the patient.  The patient agreed to proceed and under a sterile prep, I injected 1 unit into 1 cc of a combination of Celestone and Lidocaine into the left carpal tunnel, left trigger thumb. The patient tolerated the injection well. The patient was adequately and thoroughly informed of my assessment of their current condition(s).  DISCUSSION: 1.  Left shoulder and tennis elbow continue with HEP.  Injections as above.  Follow-up 1 month

## 2024-01-12 ENCOUNTER — APPOINTMENT (OUTPATIENT)
Dept: INTERNAL MEDICINE | Facility: CLINIC | Age: 54
End: 2024-01-12

## 2024-01-18 ENCOUNTER — RX RENEWAL (OUTPATIENT)
Age: 54
End: 2024-01-18

## 2024-01-24 ENCOUNTER — APPOINTMENT (OUTPATIENT)
Dept: ORTHOPEDIC SURGERY | Facility: CLINIC | Age: 54
End: 2024-01-24
Payer: MEDICARE

## 2024-01-24 VITALS
HEIGHT: 63 IN | HEART RATE: 86 BPM | DIASTOLIC BLOOD PRESSURE: 86 MMHG | WEIGHT: 193 LBS | SYSTOLIC BLOOD PRESSURE: 126 MMHG | BODY MASS INDEX: 34.2 KG/M2

## 2024-01-24 PROCEDURE — 73502 X-RAY EXAM HIP UNI 2-3 VIEWS: CPT

## 2024-01-24 PROCEDURE — 99213 OFFICE O/P EST LOW 20 MIN: CPT

## 2024-01-24 RX ORDER — DICLOFENAC SODIUM 1% 10 MG/G
1 GEL TOPICAL DAILY
Qty: 1 | Refills: 1 | Status: ACTIVE | COMMUNITY
Start: 2024-01-24 | End: 1900-01-01

## 2024-01-24 NOTE — HISTORY OF PRESENT ILLNESS
[Pain Location] : pain [] : left hip [Worsening] : worsening [Standing] : standing [Constant] : ~He/She~ states the symptoms seem to be constant [Bending] : worsened by bending [Sitting] : worsened by sitting [Hip Movement] : worsened by hip movement [Running] : worsened by running [Walking] : worsened by walking [NSAIDs] : relieved by nonsteroidal anti-inflammatory drugs [de-identified] : Patient is a 53-year-old female here today for evaluation of left lateral sided hip pain that has been going on for the past few weeks.  Patient states that she has pain over the lateral aspect of the hip.  Hurts when she lays on it.  Hurts to touch it.  States that if she is walking it starts to feel better.  Denies any pain in the groin.  Denies any numbness tingling or neurovascular compromise [de-identified] : putting on socks and shoes, getting in and out of the car, rising from a seated position, going up and down the stairs

## 2024-01-24 NOTE — ADDENDUM
[FreeTextEntry1] : This note was written by Charley Zarate, acting as the  for Dr. Méndez. This note accurately reflects the work and decisions made by Dr. Méndez.

## 2024-01-24 NOTE — DISCUSSION/SUMMARY
[Medication Risks Reviewed] : Medication risks reviewed [de-identified] : Patient is a 53-year-old female with a left greater trochanteric bursitis presenting today for initial evaluation.  I recommend conservative treatment at this time.  I given a prescription for an ultrasound-guided cortisone injection into her left greater trochanteric bursa.  I recommended low impact activity and exercise.  I given prescription for physical therapy.  I recommended diclofenac gel.  I will see her back on an as-needed basis for her left hip.  All questions were asked and answered

## 2024-01-24 NOTE — PHYSICAL EXAM
[de-identified] : GENERAL APPEARANCE: Well nourished and hydrated, pleasant, alert, and oriented x 3. Appears their stated age.  HEENT: Normocephalic, extraocular eye motion intact. Nasal septum midline. Oral cavity clear. External auditory canal clear.  RESPIRATORY: Breath sounds clear and audible in all lobes. No wheezing, No accessory muscle use. CARDIOVASCULAR: No apparent abnormalities. No lower leg edema. No varicosities. Pedal pulses are palpable. NEUROLOGIC: Sensation is normal, no muscle weakness in the upper or lower extremities. DERMATOLOGIC: No apparent skin lesions, moist, warm, no rash. SPINE: Cervical spine appears normal and moves freely; thoracic spine appears normal and moves freely; pain with range of motion lumbar spine MUSCULOSKELETAL: Hands, wrists, and elbows are normal and move freely, shoulders are normal and move freely.  PSYCHIATRIC: Oriented to person, place, and time, insight and judgement were intact and the affect was normal. [de-identified] : Ambulates normally. Left hip exam showed no groin pain with SLR, ROM is full flexion with 60 degrees external and 30 degrees internal with pain, MARITA -, FADIR -.  There is tenderness palpation over the left greater trochanter 5/5 motor strength in bilateral lower extremities. Sensory: Intact in bilateral lower extremities. DTRs: Biceps, brachioradialis, triceps, patellar, ankle and plantar 2+ and symmetric bilaterally. Pulses: dorsalis pedis, posterior tibial, femoral, popliteal, and radial 2+ and symmetric bilaterally. [de-identified] : AP pelvis and 2 views of the left hip obtained the office today show no acute fracture or dislocation.  No significant degenerative changes noted.  Spinal hardware is noted

## 2024-01-26 ENCOUNTER — RESULT REVIEW (OUTPATIENT)
Age: 54
End: 2024-01-26

## 2024-01-26 ENCOUNTER — APPOINTMENT (OUTPATIENT)
Dept: ULTRASOUND IMAGING | Facility: CLINIC | Age: 54
End: 2024-01-26
Payer: MEDICARE

## 2024-01-26 PROCEDURE — 20611 DRAIN/INJ JOINT/BURSA W/US: CPT | Mod: LT

## 2024-02-05 ENCOUNTER — APPOINTMENT (OUTPATIENT)
Dept: ORTHOPEDIC SURGERY | Facility: CLINIC | Age: 54
End: 2024-02-05
Payer: MEDICARE

## 2024-02-05 VITALS
HEIGHT: 63 IN | HEART RATE: 68 BPM | WEIGHT: 193 LBS | SYSTOLIC BLOOD PRESSURE: 135 MMHG | DIASTOLIC BLOOD PRESSURE: 87 MMHG | BODY MASS INDEX: 34.2 KG/M2

## 2024-02-05 PROCEDURE — 99213 OFFICE O/P EST LOW 20 MIN: CPT

## 2024-02-05 RX ORDER — DICLOFENAC SODIUM 50 MG/1
50 TABLET, DELAYED RELEASE ORAL
Qty: 60 | Refills: 0 | Status: ACTIVE | COMMUNITY
Start: 2024-02-05 | End: 1900-01-01

## 2024-02-22 ENCOUNTER — APPOINTMENT (OUTPATIENT)
Dept: ORTHOPEDIC SURGERY | Facility: CLINIC | Age: 54
End: 2024-02-22

## 2024-02-26 NOTE — DISCUSSION/SUMMARY
[Medication Risks Reviewed] : Medication risks reviewed [PRN] : PRN [de-identified] : Patient is a 54-year-old female with a left greater trochanteric bursitis presenting today for follow-up. She has yet to have relief after receiving a cortisone injection in her left greater trochanteric bursa about 10 days ago. The patient was advised that it is too soon to determine if the injection has failed to provide relief. I have sent a prescription for diclofenac 50mg to be taken 2 times daily as needed for pain. I recommended low impact activity and exercise. I have also provided a worksheet demonstrating various exercises and stretches to help alleviate pain associated with trochanteric bursitis. The patient should follow up in 6-8 weeks if no improvement of symptoms.  All questions were asked and answered. The patient verbalized understanding the plan.

## 2024-02-26 NOTE — ADDENDUM
[FreeTextEntry1] : I, Dr. Méndez, personally performed the evaluation and management (E/M) services for this established patient who presents today with (a) new problem(s)/exacerbation of (an) existing condition(s). That E/M includes conducting the clinically appropriate interval history &/or exam, assessing all new/exacerbated conditions, and establishing a new plan of care. Today, my KASI, Shayne Bridget, P.A., was here to observe my evaluation and management service for this new problem/exacerbated condition and follow the plan of care established by me going forward.  This note was written by Charley Zarate, acting as the  for Dr. Méndez. This note accurately reflects the work and decisions made by Dr. Méndez.

## 2024-02-26 NOTE — PHYSICAL EXAM
[Normal] : Gait: normal [de-identified] : Ambulates normally. Left hip exam showed no groin pain with SLR, ROM is full flexion with 60 degrees external and 30 degrees internal with pain, MARITA -, FADIR -.  There is tenderness palpation over the left greater trochanter 5/5 motor strength in bilateral lower extremities. Sensory: Intact in bilateral lower extremities. DTRs: Biceps, brachioradialis, triceps, patellar, ankle and plantar 2+ and symmetric bilaterally. Pulses: dorsalis pedis, posterior tibial, femoral, popliteal, and radial 2+ and symmetric bilaterally.

## 2024-02-26 NOTE — HISTORY OF PRESENT ILLNESS
[Pain Location] : pain [] : left hip [Worsening] : worsening [___ mths] : [unfilled] month(s) ago [Standing] : standing [Constant] : ~He/She~ states the symptoms seem to be constant [Bending] : worsened by bending [Hip Movement] : worsened by hip movement [Sitting] : worsened by sitting [Running] : worsened by running [Walking] : worsened by walking [NSAIDs] : relieved by nonsteroidal anti-inflammatory drugs [de-identified] : Patient is a 54-year-old female here today for follow-up of left greater trochanteric bursitis. The patient received a ultrasound guided cortisone injection for bursitis about 10 days ago. She states that she is still having pain when laying on her left side. She has been using voltaren gel with no relief. Has not been to PT or performed any stretches or exercises. Denies any significant changes to her medical history since her last visit, recent injuries, falls/trauma, mechanical symptoms of the hip, groin pain, numbness/tingling, radiation of pain to knee.  [de-identified] : putting on socks and shoes, getting in and out of the car, rising from a seated position, going up and down the stairs

## 2024-02-29 ENCOUNTER — APPOINTMENT (OUTPATIENT)
Dept: ORTHOPEDIC SURGERY | Facility: CLINIC | Age: 54
End: 2024-02-29
Payer: MEDICARE

## 2024-02-29 VITALS
DIASTOLIC BLOOD PRESSURE: 78 MMHG | HEART RATE: 74 BPM | HEIGHT: 63 IN | WEIGHT: 193 LBS | BODY MASS INDEX: 34.2 KG/M2 | SYSTOLIC BLOOD PRESSURE: 137 MMHG

## 2024-02-29 PROCEDURE — 99214 OFFICE O/P EST MOD 30 MIN: CPT

## 2024-02-29 NOTE — REVIEW OF SYSTEMS
[Negative] : Heme/Lymph [Joint Pain] : no joint pain [Joint Stiffness] : no joint stiffness [Joint Swelling] : no joint swelling [FreeTextEntry9] : Left hip

## 2024-02-29 NOTE — DISCUSSION/SUMMARY
[de-identified] : 54-year-old female with left greater trochanteric bursitis presenting today for follow-up.  She has not had any relief of her symptoms despite ultrasound-guided cortisone injection, NSAID therapy and provider directed exercises/stretches.  States that her pain has been worsening and on exam she is exquisitely tender over the lateral aspect of her hip.  Since her pain has been worsening and has not improved with any conservative modalities I recommended MRI of the left hip for further evaluation and management and to rule out any occult fracture.  I recommend that the patient be protected weightbearing in the left lower extremity with the use of a cane which she states that she has at home.  She may continue to take Tylenol and NSAIDs as needed for any pain.  She should follow-up with the office after she receives her MRI.  All questions were addressed, the patient verbalized understanding is in agreement with the plan.

## 2024-02-29 NOTE — HISTORY OF PRESENT ILLNESS
[Worsening] : worsening [Sitting] : sitting [Standing] : standing [Daily] : ~He/She~ states the symptoms seem to be occuring daily [Direct Pressure] : worsened by direct pressure [Walking] : worsened by walking [de-identified] : 54-year-old female presents to the office for follow-up of left lateral hip pain after receiving a cortisone injection for greater trochanteric bursitis in January.  The patient states that she never had any relief from her injection.  She has been taking ibuprofen and Tylenol with no relief of her symptoms.  She states that she has performed the exercises that we discussed at her last visit daily without any resolution of symptoms.  Her pain is exacerbated with any direct pressure to the lateral side of her hip, laying on the lateral side of her hip and walking for long periods.  She also states that she sometimes needs to readjust when sitting for long periods of time.  Denies any significant changes to her medical history since her last visit, recent injuries falls or trauma, groin pain, skin color changes, swelling. [Acetaminophen] : not relieved by acetaminophen [NSAIDs] : not relieved by nonsteroidal anti-inflammatory drugs

## 2024-02-29 NOTE — PHYSICAL EXAM
[de-identified] : Left hip exam showed no groin pain with SLR, ROM is full flexion with 60 degrees external and 30 degrees internal with pain, MARITA -, FADIR -. There is tenderness palpation over the left greater trochanter 5/5 motor strength in bilateral lower extremities. Sensory: Intact in bilateral lower extremities. DTRs: Biceps, brachioradialis, triceps, patellar, ankle and plantar 2+ and symmetric bilaterally. Pulses: dorsalis pedis, posterior tibial, femoral, popliteal, and radial 2+ and symmetric bilaterally.

## 2024-03-01 RX ORDER — ATORVASTATIN CALCIUM 20 MG/1
20 TABLET, FILM COATED ORAL
Qty: 90 | Refills: 1 | Status: ACTIVE | COMMUNITY
Start: 2017-11-09 | End: 1900-01-01

## 2024-03-04 ENCOUNTER — APPOINTMENT (OUTPATIENT)
Dept: MRI IMAGING | Facility: CLINIC | Age: 54
End: 2024-03-04
Payer: MEDICARE

## 2024-03-04 ENCOUNTER — OUTPATIENT (OUTPATIENT)
Dept: OUTPATIENT SERVICES | Facility: HOSPITAL | Age: 54
LOS: 1 days | End: 2024-03-04

## 2024-03-04 ENCOUNTER — RX RENEWAL (OUTPATIENT)
Age: 54
End: 2024-03-04

## 2024-03-04 DIAGNOSIS — Z90.711 ACQUIRED ABSENCE OF UTERUS WITH REMAINING CERVICAL STUMP: Chronic | ICD-10-CM

## 2024-03-04 DIAGNOSIS — M70.62 TROCHANTERIC BURSITIS, LEFT HIP: ICD-10-CM

## 2024-03-04 DIAGNOSIS — Z98.1 ARTHRODESIS STATUS: Chronic | ICD-10-CM

## 2024-03-04 PROCEDURE — 73721 MRI JNT OF LWR EXTRE W/O DYE: CPT | Mod: 26,LT

## 2024-03-05 ENCOUNTER — RX RENEWAL (OUTPATIENT)
Age: 54
End: 2024-03-05

## 2024-03-05 RX ORDER — NEBIVOLOL 5 MG/1
5 TABLET ORAL
Qty: 90 | Refills: 0 | Status: ACTIVE | COMMUNITY
Start: 1900-01-01 | End: 1900-01-01

## 2024-03-06 ENCOUNTER — APPOINTMENT (OUTPATIENT)
Dept: ORTHOPEDIC SURGERY | Facility: CLINIC | Age: 54
End: 2024-03-06

## 2024-03-12 ENCOUNTER — APPOINTMENT (OUTPATIENT)
Dept: ORTHOPEDIC SURGERY | Facility: CLINIC | Age: 54
End: 2024-03-12
Payer: MEDICARE

## 2024-03-12 PROCEDURE — 99441: CPT

## 2024-03-18 ENCOUNTER — APPOINTMENT (OUTPATIENT)
Dept: INTERNAL MEDICINE | Facility: CLINIC | Age: 54
End: 2024-03-18

## 2024-03-20 ENCOUNTER — APPOINTMENT (OUTPATIENT)
Dept: PHYSICAL MEDICINE AND REHAB | Facility: CLINIC | Age: 54
End: 2024-03-20

## 2024-03-21 ENCOUNTER — APPOINTMENT (OUTPATIENT)
Dept: PHYSICAL MEDICINE AND REHAB | Facility: CLINIC | Age: 54
End: 2024-03-21
Payer: MEDICARE

## 2024-03-21 VITALS — HEIGHT: 63 IN | BODY MASS INDEX: 35.44 KG/M2 | WEIGHT: 200 LBS | HEART RATE: 71 BPM

## 2024-03-21 DIAGNOSIS — M25.552 PAIN IN LEFT HIP: ICD-10-CM

## 2024-03-21 DIAGNOSIS — M70.62 TROCHANTERIC BURSITIS, LEFT HIP: ICD-10-CM

## 2024-03-21 DIAGNOSIS — M67.952 UNSPECIFIED DISORDER OF SYNOVIUM AND TENDON, LEFT THIGH: ICD-10-CM

## 2024-03-21 PROCEDURE — 99204 OFFICE O/P NEW MOD 45 MIN: CPT

## 2024-03-21 RX ORDER — METHOCARBAMOL 500 MG/1
500 TABLET, FILM COATED ORAL
Qty: 30 | Refills: 1 | Status: DISCONTINUED | COMMUNITY
Start: 2023-06-12 | End: 2024-03-21

## 2024-03-21 RX ORDER — GABAPENTIN 800 MG/1
800 TABLET, COATED ORAL AT BEDTIME
Qty: 90 | Refills: 0 | Status: DISCONTINUED | COMMUNITY
Start: 2023-08-08 | End: 2024-03-21

## 2024-03-21 RX ORDER — DIAZEPAM 5 MG/1
5 TABLET ORAL 3 TIMES DAILY
Qty: 21 | Refills: 0 | Status: DISCONTINUED | COMMUNITY
Start: 2023-04-03 | End: 2024-03-21

## 2024-03-21 RX ORDER — MELOXICAM 15 MG/1
15 TABLET ORAL
Qty: 15 | Refills: 0 | Status: DISCONTINUED | COMMUNITY
Start: 2023-01-13 | End: 2024-03-21

## 2024-03-21 RX ORDER — DIAZEPAM 5 MG/1
5 TABLET ORAL 3 TIMES DAILY
Qty: 21 | Refills: 0 | Status: DISCONTINUED | COMMUNITY
Start: 2023-03-31 | End: 2024-03-21

## 2024-03-21 RX ORDER — DIAZEPAM 2 MG/1
2 TABLET ORAL TWICE DAILY
Qty: 14 | Refills: 0 | Status: DISCONTINUED | COMMUNITY
Start: 2023-03-29 | End: 2024-03-21

## 2024-03-21 NOTE — REVIEW OF SYSTEMS
[Difficulty Walking] : difficulty walking [Fever] : no fever [Chills] : no chills [Chest Pain] : no chest pain [Shortness Of Breath] : no shortness of breath [Incontinence] : no incontinence [FreeTextEntry9] : +left hip/buttock pain

## 2024-03-21 NOTE — HISTORY OF PRESENT ILLNESS
[FreeTextEntry1] : Ms. JAMES REIS is a 54 year female who presents with left hip/buttock pain   follows with Ortho (Dr. Méndez) for left hip GT bursitis, Ortho (Dr. Bhagat) for neck and low back pain, is s/p lumbar lami and fusion s/p revision.    HPI  03/21/2024 Location: left hip/buttock  Onset: July 2023, denies injury or trauma, progressively worsened, left lateral buttock to knee.  Provocation/Palliative: worse with walking, stretching Quality: ripping, sharp  Radiation: left buttock to lateral thigh/knee.  Severity:  5-10/10   Denies any associated numbness. Denies any associated leg weakness. Denies any loss of bowel/bladder control or any groin numbness.   Current pain medications: motrin-tylenol - TID - helpful for 1-2 hours  duloxetine 60mg BID - for depression  gabapentin 300/600mg - for anxiety, managed by psychiatrist     Previous medications trialed: pregbalin 150mg  meloxicam 15mg qd methocarbamol 750mg TID PRN    Previous procedures relevant to complaint: Injections:    1/26/24 - US guided L GTB CSI - not helpful    12/21/23 - Left carpal tunnel CSI     hx of ?AMINATA  and ?LESI (Dr. Raza) - not helpful  Surgery:   4/2023 - C4-C7 level ACDF (Dr. Bhagat)    2021, 2015  - L4-L5, L5-S1 fusion, s/p revision    hx of SCS s/p removal d/t infection    Conservative therapy tried: Physical Therapy: + hip/buttock pain, has done in the past for back and neck pain - not helpful     Diagnostic studies reviewed by me: MRI L hip - glut med and min tendinosis, edema.  MR L spine - L5-S1 fusion

## 2024-03-21 NOTE — PHYSICAL EXAM
[FreeTextEntry1] : Constitutional: In NAD, calm and cooperative Heart: well perfused Lungs: nonlabored breathing MSK (Back) Inspection: +midline lumbar scar, skin well approximated, no surrounding erythema, no swelling  Palpation: ++ttp over left glut med, +ttp over left greater trochanter, +ttp over left IT band, nontender over lumbar paraspinals.  ROM: Pain with hip rotation  Strength: 5/5 strength in bilateral lower extremities Reflexes: 2+ Patella reflex bilaterally, 2+ Achilles reflex bilaterally, negative clonus bilaterally Sensation: Intact to light touch in bilateral lower extremities Special tests: SLR: negative BL MARITA: negative BL, causes lateral hip pain  FADIR: negative BL Thigh Thrust: negative BL Alexandra's Finger:  negative BL Facet loading:negative BL

## 2024-03-21 NOTE — ASSESSMENT
[FreeTextEntry1] : Ms. JAMES REIS is a 54 year female who presents with chronic left lateral hip/buttock pain. On examination, there are no focal neurologic deficits, negative SLR, negative SIJ provocative maneuvers. She is most tender to palpation directly over her left glut med. She also has tenderness to palpation over her left greater trochanter and along the left IT band. She is unable to afford continued PT sessions, has been compliant with her HEP and stretching on a daily basis. She notes mild, temporary relief with motrin+tylenol. I discussed with the patient that the likely etiology of her pain is due to gluteal tendinopathy. Though she has hx of lumbar surgery, she denies any back pain, denies radicular symptoms.     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 Continue tylenol 500-1000mg TID PRN, do not exceed 3g/day - Start Meloxicam 15 mg PO qdaily x 30 days prn pain, recommend to take with food.  Denies CKD, CAD, or gastritis.  Recommend that if patient develops GI symptoms including abdominal pain, nausea, or vomiting to discontinue use of medication immediately. - continue HEP - Referral to Dr. Paul for US evaluation +/- CSI as indicated for left gluteus medius/left gluteus minimus.  - RTC in 1 month.     The patient expressed verbal understanding and is in agreement with the plan of care. All of the patient's questions and concerns were addressed during today's visit.

## 2024-03-21 NOTE — DATA REVIEWED
[MRI] : MRI [FreeTextEntry1] : EXAM: [id number] - MR HIP LT  - ORDERED BY: BRAD KLEIN   PROCEDURE DATE:  03/04/2024    INTERPRETATION:  Exam Type: MR HIP LEFT Exam Date and Time: 3/4/2024 4:05 PM Indication: Left hip pain. Comparison: No relevant prior studies available for comparison.  TECHNIQUE: MRI dedicated to the left hip was performed on a 1.5 Janeth system using a standard non-contrast protocol, including small field-of-view imaging of the affected hip in four planes (axial, axial oblique, sagittal, and coronal).  FINDINGS: OSSEOUS STRUCTURES: Alignment is anatomic. No acute fracture or osseous stress response. No avascular necrosis of the femoral head. Background marrow signal is normal without infiltrative marrow process. No suspicious osseous lesions.  LEFT HIP JOINT: Labrum: No labral tear by non-arthrogram MRI. Cartilage: No chondrosis or focal cartilage defect. Femoroacetabular impingement (RENETTA): No cam-type or pincer-type morphology. No over coverage or acetabular dysplasia. Effusion: No hip joint effusion.  TENDONS and BURSAE: Common adductor tendon origin: Intact without tendinosis or tear. Rectus femoris tendon origin: Intact without tendinosis or tear. Iliopsoas tendon: Intact without tendinosis or tear. Hamstring tendon origin: Intact without tendinosis or tear. Gluteal tendons: There is gluteus medius and minimus tendinosis with surrounding peritendinous edema. No tendon tear. Bursae: Trace greater trochanteric bursitis. No iliopsoas bursitis.  CORE: Pubic symphysis: No osteitis pubis.  MUSCLES: No muscle atrophy or acute muscle strains.  OTHER: Sacroiliac joint: No sacroiliitis or joint effusion. Sciatic nerve: Normal in size and signal. Preserved perineural fat plane. Ischiofemoral fossae: No narrowing of the ischiofemoral space. No edema in the quadratus femoris muscle. Pelvic viscera (only partially imaged): No hernia by MRI.   IMPRESSION: There is gluteus medius and minimus tendinosis with surrounding peritendinous edema and overlying trace greater trochanteric bursitis. No tendon tear.  --- End of Report ---       BHAVIN YOUNG MD; Attending Radiologist This document has been electronically signed. Mar  8 2024 11:30AM  MRI-3T LUMBAR SPINE NON CONTRAST  HISTORY: Z98.1 Spinal fusion M51.36 Degeneration lumbar spine M79.605 Left Leg Pain M62.830 Spasm of back muscles M62.81 Muscle weakness  TECHNIQUE: MRI of the lumbar spine was performed on a 3.0 Janeth ultra high field magnetic resonance imaging unit with multiple sequences in sagittal and axial planes.   COMPARISON: October 19, 2020  FINDINGS: ALIGNMENT: The usual lordosis is maintained.  VERTEBRAL BODIES:The vertebral bodies are normal in height.  MARROW SIGNAL:There are no pathologic marrow replacing lesions. There is prominent Modic type I endplate degenerative signal change present about the L4-L5 intervertebral disc.  DISC SPACES:Status post posterior lumbar interbody fusion at L5-S1 with decompression of the spinal canal posteriorly. This appears unchanged.   L1-2:There is no disc herniation or stenosis.   L2-3: There is no disc herniation or stenosis.   L3-4: There is no disc herniation or stenosis.   L4-5: Progression of intervertebral disc height loss and disc bulging. Facet hypertrophic changes. There is no spinal canal stenosis. There is mild left and moderate to severe right foraminal stenosis owing to a superimposed right foraminal disc herniation. This appears similar to the previous study.  L5-S1: Posterior interbody fusion with decompression of the spinal canal posteriorly. No significant residual stenosis. No appreciable interval change.   The region of the conus medullaris lies at the L1-L2 level and is unremarkable.   IMPRESSION:   Mild progression of degenerative changes at L4-L5. No spinal canal stenosis. Moderate to severe right foraminal stenosis at L4-L5 similar to previous study.  Status post posterior lumbar interbody fusion at L5-S1 with decompression of the spinal canal posteriorly and no significant residual stenosis, unchanged.     Signed by: Freedom Marin MD Signed Date: 9/13/2021 5:35 PM EDT    SIGNED BY: Freedom Marin M.D., Ext. [phone] 09/13/2021 05:35 PM

## 2024-03-27 ENCOUNTER — APPOINTMENT (OUTPATIENT)
Dept: PHYSICAL MEDICINE AND REHAB | Facility: CLINIC | Age: 54
End: 2024-03-27
Payer: MEDICARE

## 2024-03-27 VITALS
BODY MASS INDEX: 35.44 KG/M2 | DIASTOLIC BLOOD PRESSURE: 87 MMHG | SYSTOLIC BLOOD PRESSURE: 135 MMHG | HEIGHT: 63 IN | RESPIRATION RATE: 16 BRPM | HEART RATE: 76 BPM | WEIGHT: 200 LBS

## 2024-03-27 PROCEDURE — 99214 OFFICE O/P EST MOD 30 MIN: CPT

## 2024-03-27 NOTE — PHYSICAL EXAM
[FreeTextEntry1] : NAD A&Ox3 Obese ROM L-spine: 3/4 flexion; 10-15 extension w/ pain ROM Hips: smooth IR/ER w/o pain Pelvic tilt: slight Seated slump test: neg left SLR: neg left MARITA's: neg left DTR's: 2+ knees/ankles MMT: 4+/5 L HF 2' pain; 4/5 L TA; 5/5 B/L PF Sensation: Dyesthesia to PP along left LCN thigh distrib Toe & Heel Walk: Difficulty heel walking left Palpation: L GT femur TTP, L GMed tendin TTP, L prox & distal IT band TTP

## 2024-03-27 NOTE — ASSESSMENT
[FreeTextEntry1] : 54 y.o. F w/ h/o C4-7 ACDF (April 2023), L4-S1 PSF (L5-S1 fusion 2015 -> revised in Nov 2021 + L4-5 fusion) referred to office for possible US guided left gluteal/hip injection.  I spent most of today's office visit (40 minutes) reviewing the patient's relevant imaging studies, orthopedic and physiatry office notes, discussing potential etiology, pathogenesis, further diagnostic workup and nonoperative management.  MRI left hip significant for gluteus medius and minimus tendinosis without tear.  Although the patient appears to have a component of greater trochanteric pain syndrome, the pain she experiences on the medial aspect of her left thigh is not concordant.  Based upon the patient's prior history of lumbar spinal fusion with revision and her chronic left ankle dorsiflexion weakness, I recommended an EMG to document chronic L4-5 radiculopathy.  Although I do not anticipate the patient requiring lumbar transforaminal epidural steroid injections, she may benefit from an ultrasound-guided left lateral cutaneous nerve of the thigh hydrodissection.  I would first, of course, ultrasound the LCN thigh measure its cross-sectional area and compare it to the contralateral right side.  I do not think it would be reasonable to perform hydrodissection of the iliotibial band given the patient's history and body habitus.  I will discuss these recommendations with Dr. Craig her referring physiatrist.  The patient is otherwise in agreement with the plan.  All questions have been answered.  Return to office for EMG left leg and then ultrasound left LCN thigh.

## 2024-03-27 NOTE — HISTORY OF PRESENT ILLNESS
[FreeTextEntry1] : 54 y.o. F w/ h/o C4-7 ACDF (April 2023), L4-S1 PSF (L5-S1 fusion 2015 -> revised in Nov 2021 + L4-5 fusion) referred to office for possible US guided left gluteal/hip injection.  Pt. endorses 8 month h/o pain centered in left buttock w/ radiation into lateral thigh and, at times, medial thigh and lateral knee.  Denies N/T/P past knee into leg or foot.  Denies similar sxs in right buttock or hip/thigh.  No axial LBP.  Saw Dr. Méndez who ordered MRI left hip.  Results detailed below.  Was sent for US guided left hip injection which was not helpful.  Had 3 sessions of P.T. but could not continue 2' high out of pocket expenses.

## 2024-03-27 NOTE — DATA REVIEWED
[MRI] : MRI [FreeTextEntry1] : MRI Left Hip (March 2024): There is gluteus medius and minimus tendinosis with surrounding peritendinous edema and overlying trace greater trochanteric bursitis. No tendon tear.

## 2024-04-01 ENCOUNTER — APPOINTMENT (OUTPATIENT)
Dept: MAMMOGRAPHY | Facility: CLINIC | Age: 54
End: 2024-04-01
Payer: MEDICARE

## 2024-04-01 ENCOUNTER — OUTPATIENT (OUTPATIENT)
Dept: OUTPATIENT SERVICES | Facility: HOSPITAL | Age: 54
LOS: 1 days | End: 2024-04-01
Payer: MEDICARE

## 2024-04-01 ENCOUNTER — APPOINTMENT (OUTPATIENT)
Dept: ULTRASOUND IMAGING | Facility: CLINIC | Age: 54
End: 2024-04-01
Payer: MEDICARE

## 2024-04-01 DIAGNOSIS — Z00.8 ENCOUNTER FOR OTHER GENERAL EXAMINATION: ICD-10-CM

## 2024-04-01 PROCEDURE — 77067 SCR MAMMO BI INCL CAD: CPT | Mod: 26

## 2024-04-01 PROCEDURE — 77067 SCR MAMMO BI INCL CAD: CPT

## 2024-04-01 PROCEDURE — 77063 BREAST TOMOSYNTHESIS BI: CPT | Mod: 26

## 2024-04-01 PROCEDURE — 76536 US EXAM OF HEAD AND NECK: CPT | Mod: 26

## 2024-04-01 PROCEDURE — 77063 BREAST TOMOSYNTHESIS BI: CPT

## 2024-04-01 PROCEDURE — 76536 US EXAM OF HEAD AND NECK: CPT

## 2024-04-02 ENCOUNTER — APPOINTMENT (OUTPATIENT)
Dept: PHYSICAL MEDICINE AND REHAB | Facility: CLINIC | Age: 54
End: 2024-04-02
Payer: MEDICARE

## 2024-04-02 VITALS
DIASTOLIC BLOOD PRESSURE: 84 MMHG | BODY MASS INDEX: 35.44 KG/M2 | SYSTOLIC BLOOD PRESSURE: 127 MMHG | WEIGHT: 200 LBS | RESPIRATION RATE: 16 BRPM | HEIGHT: 63 IN | HEART RATE: 79 BPM

## 2024-04-02 PROCEDURE — 95886 MUSC TEST DONE W/N TEST COMP: CPT | Mod: LT

## 2024-04-02 PROCEDURE — 95908 NRV CNDJ TST 3-4 STUDIES: CPT

## 2024-04-02 NOTE — PROCEDURE
[de-identified] : PRE-PROCEDURE  * Was H&P completed and in chart at time of procedure ?  YES * Were the indications for the procedure appropriate ?  YES * Were the practitioner's entries in the patient's medical record appropriate ?  YES  PROCEDURE * Did the practitioner maintain proper sterile technique ?  YES * If bleeding was encountered, did the practitioner manage it appropriately?  YES * Were complications, if any, recognized and managed appropriately?  YES * Was the practitioner's use of diagnostic services (e.g., lab, x-ray, MRI, CT) appropriate?  YES  POST-PROCEDURE * Did the pre-procedure diagnosis coincide with the findings of the procedure?  YES * Was the procedure report complete, accurate and timely?  YES

## 2024-04-02 NOTE — ASSESSMENT
[FreeTextEntry1] : EMG/NCS LEFT LE performed at today's office visit.  EDX findings are highly suspicious for chronic L5 radiculopathy affecting the motor axons.  Full report to follow.

## 2024-04-16 ENCOUNTER — APPOINTMENT (OUTPATIENT)
Dept: PHYSICAL MEDICINE AND REHAB | Facility: CLINIC | Age: 54
End: 2024-04-16
Payer: MEDICARE

## 2024-04-16 VITALS
SYSTOLIC BLOOD PRESSURE: 127 MMHG | WEIGHT: 200 LBS | HEIGHT: 63 IN | DIASTOLIC BLOOD PRESSURE: 87 MMHG | HEART RATE: 105 BPM | RESPIRATION RATE: 15 BRPM | BODY MASS INDEX: 35.44 KG/M2

## 2024-04-16 PROCEDURE — 99214 OFFICE O/P EST MOD 30 MIN: CPT | Mod: 25

## 2024-04-16 PROCEDURE — 76942 ECHO GUIDE FOR BIOPSY: CPT

## 2024-04-16 PROCEDURE — 64450 NJX AA&/STRD OTHER PN/BRANCH: CPT | Mod: LT

## 2024-04-16 NOTE — DATA REVIEWED
[MRI] : MRI [FreeTextEntry1] : EMG/NCS LEFT LE (4/2/24): EDX findings are highly suspicious for chronic L5 radiculopathy affecting the motor axons.  MRI Left Hip (March 2024): There is gluteus medius and minimus tendinosis with surrounding peritendinous edema and overlying trace greater trochanteric bursitis. No tendon tear.

## 2024-04-16 NOTE — ASSESSMENT
[FreeTextEntry1] : 54 y.o. F w/ h/o C4-7 ACDF (April 2023), L4-S1 PSF (L5-S1 fusion 2015 -> revised in Nov 2021 + L4-5 fusion) with chronic left lateral thigh pain.  I spent most of today's office visit (30 minutes) reviewing the patient's EMG, reviewing and performing the ultrasound guided left lateral cutaneous nerve of the thigh hydrodissection, discussing etiology, pathogenesis, further diagnostic workup and nonoperative management.  Preprocedural scanning revealed an enlarged LCN of the thigh.  The patient tolerated the procedure quite well.  Her EMG was highly suspicious for chronic L5 radiculopathy affecting the motor axons.  I explained that her body habitus predisposes her to this more proximal compression neuropathy outside of her spine.  Her MRI left hip significant for gluteus medius and minimus tendinosis without tear.  Although the patient appears to have a component of greater trochanteric pain syndrome, depending upon how well the patient responds to today's intervention will inform us on further management.  Should the patient experience more dysesthetic pain radiating down her leg in an L5 distribution, we may then elect to proceed with an L5-S1 transforaminal epidural steroid injection.  I will discuss these recommendations with Dr. Craig her referring physiatrist.  The patient is otherwise in agreement with the plan.  All questions have been answered.  Return to office 2 to 4 weeks.

## 2024-04-16 NOTE — PROCEDURE
[de-identified] : PROCEDURE NOTE  PROCEDURE: ULTRASOUND GUIDED LEFT LATERAL CUTANEOUS NERVE THIGH (LCNT) HYDRODISSECTION PHYSICIAN: NEISHA LEY DO MEDICATIONS INJECTED: 12 CC D5W, 4 CC 1.0% LIDOCAINE AND 1.0CC DEXAMETHASONE (TOTAL) SEDATION MEDICATIONS: NONE BLOOD LOSS: NONE COMPLICATIONS: NONE  TECHNIQUE: R/B/A TO US GUIDED LEFT LCNT HYDRODISSECTION REVIEWED WITH PATIENT.  PT. IS AGREEABLE AND WISHES TO PROCEED.  SIGNED CONSENT FORM TO BE SCANNED INTO EMR.  PT. PLACED IN SUPINE POSITION WITH THE AFFECTED LEG SLIGHTLY EXTERNALLY ROTATED.  THE US PROBE WAS PLACED IN A TRANSVERSE PLANE 2-3 CM DISTAL TO THE ASIS.  THE LCNT WAS IDENTIFIED IN THE INTERMUSCULAR SPACE BETWEEN THE TENSOR FASCIA MAYKEL AND THE SARTORIUS MUSCLE DISTAL TO THE ASIS, AFTER WHICH THE NERVE WAS TRACED MORE CRANIALLY.  A 22-G, 3.5" SPINAL NEEDLE WAS THEN ADVANCED IN-PLANE ABOVE AND BELOW THE NERVE DEPOSITING SMALL ALIQUOTS OF INJECTATE UNTIL THE NERVE WAS SEEN SURROUNDED BY FLUID AND FREE OF RESTRICTIONS.  THE PATIENT TOLERATED THE PROCEDURE WELL WITHOUT ADVERSE EFFECTS.  A BAND AID WAS APPLIED.  POST-INJECTION INSTRUCTIONS GIVEN.

## 2024-04-16 NOTE — HISTORY OF PRESENT ILLNESS
[FreeTextEntry1] : 54 y.o. F w/ h/o C4-7 ACDF (April 2023), L4-S1 PSF (L5-S1 fusion 2015 -> revised in Nov 2021 + L4-5 fusion) returns to office for EMG review and ultrasound guided left lateral cutaneous nerve of the thigh hydrodissection.  Results detailed below.

## 2024-04-18 ENCOUNTER — APPOINTMENT (OUTPATIENT)
Dept: PHYSICAL MEDICINE AND REHAB | Facility: CLINIC | Age: 54
End: 2024-04-18
Payer: MEDICARE

## 2024-04-18 VITALS
WEIGHT: 195 LBS | DIASTOLIC BLOOD PRESSURE: 86 MMHG | HEIGHT: 63 IN | BODY MASS INDEX: 34.55 KG/M2 | RESPIRATION RATE: 14 BRPM | SYSTOLIC BLOOD PRESSURE: 125 MMHG | HEART RATE: 103 BPM

## 2024-04-18 DIAGNOSIS — M25.562 PAIN IN LEFT KNEE: ICD-10-CM

## 2024-04-18 PROCEDURE — 99214 OFFICE O/P EST MOD 30 MIN: CPT

## 2024-04-18 PROCEDURE — 73562 X-RAY EXAM OF KNEE 3: CPT | Mod: RT

## 2024-04-18 NOTE — ASSESSMENT
[FreeTextEntry1] : 54 y.o. F w/ h/o C4-7 ACDF (April 2023), L4-S1 PSF (L5-S1 fusion 2015 -> revised in Nov 2021 + L4-5 fusion) with chronic left lateral thigh pain.  I spent most of today's office visit (30 minutes) reviewing the patient's x-rays right knee, reviewing the ultrasound guided left lateral cutaneous nerve of the thigh hydrodissection, discussing etiology, pathogenesis and further nonoperative management.  X-rays right knee unremarkable.  As the patient has exquisite medial sided tenderness, mechanical symptoms such as clicking and giveaway, we will obtain an MRI of the right knee without contrast to rule in medial meniscus tear.  Further treatment recommendations will depend upon the results of the patient's MRI scan.  Regarding the patient's left lateral thigh pain, she is quite pleased with the results of the LCNT hydrodissection. The patient's EMG was highly suspicious for chronic L5 radiculopathy affecting the motor axons.  Her MRI left hip was significant for gluteus medius and minimus tendinosis without tear.  Although the patient appears to have a component of greater trochanteric pain syndrome, I would not direct further treatment at her gluteal tendons at this time.  Should the patient experience more dysesthetic pain radiating down her leg in an L5 distribution, we may then elect to proceed with an L5-S1 transforaminal epidural steroid injection.  I advised a comprehensive weight loss program to help reduce irritation of the nerve in the future. I will discuss these recommendations with Dr. Craig her referring physiatrist.  The patient is otherwise in agreement with the plan.  All questions have been answered.  Return to office 1 to 2 weeks for MRI review.

## 2024-04-18 NOTE — DATA REVIEWED
[MRI] : MRI [FreeTextEntry1] : X-rays right knee (3 views obtained at today's office visit): The medial and lateral joint compartments are well-maintained; there is a slight lateral patellar tilt but no significant osteoarthrosis of the patellofemoral articulation.  The osseous structures do not appear osteopenic.  US (4/16/24) LEFT HEMIPELVIS: Preprocedural scanning revealed an enlarged LCN of the thigh  EMG/NCS LEFT LE (4/2/24): EDX findings are highly suspicious for chronic L5 radiculopathy affecting the motor axons.  MRI Left Hip (March 2024): There is gluteus medius and minimus tendinosis with surrounding peritendinous edema and overlying trace greater trochanteric bursitis. No tendon tear.
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
## 2024-04-26 ENCOUNTER — APPOINTMENT (OUTPATIENT)
Dept: MRI IMAGING | Facility: CLINIC | Age: 54
End: 2024-04-26
Payer: MEDICARE

## 2024-04-26 ENCOUNTER — OUTPATIENT (OUTPATIENT)
Dept: OUTPATIENT SERVICES | Facility: HOSPITAL | Age: 54
LOS: 1 days | End: 2024-04-26
Payer: MEDICARE

## 2024-04-26 DIAGNOSIS — Z00.8 ENCOUNTER FOR OTHER GENERAL EXAMINATION: ICD-10-CM

## 2024-04-26 DIAGNOSIS — M25.561 PAIN IN RIGHT KNEE: ICD-10-CM

## 2024-04-26 DIAGNOSIS — Z90.711 ACQUIRED ABSENCE OF UTERUS WITH REMAINING CERVICAL STUMP: Chronic | ICD-10-CM

## 2024-04-26 DIAGNOSIS — Z98.1 ARTHRODESIS STATUS: Chronic | ICD-10-CM

## 2024-04-26 PROCEDURE — 73721 MRI JNT OF LWR EXTRE W/O DYE: CPT | Mod: 26,RT

## 2024-04-26 PROCEDURE — 73721 MRI JNT OF LWR EXTRE W/O DYE: CPT

## 2024-04-29 ENCOUNTER — RX RENEWAL (OUTPATIENT)
Age: 54
End: 2024-04-29

## 2024-04-30 ENCOUNTER — RX RENEWAL (OUTPATIENT)
Age: 54
End: 2024-04-30

## 2024-04-30 RX ORDER — METHOCARBAMOL 750 MG/1
750 TABLET, FILM COATED ORAL 3 TIMES DAILY
Qty: 90 | Refills: 0 | Status: ACTIVE | COMMUNITY
Start: 2022-06-30 | End: 1900-01-01

## 2024-04-30 RX ORDER — VALACYCLOVIR 1 G/1
1 TABLET, FILM COATED ORAL
Qty: 90 | Refills: 0 | Status: ACTIVE | COMMUNITY
Start: 2023-06-26 | End: 1900-01-01

## 2024-05-02 ENCOUNTER — APPOINTMENT (OUTPATIENT)
Dept: PHYSICAL MEDICINE AND REHAB | Facility: CLINIC | Age: 54
End: 2024-05-02
Payer: MEDICARE

## 2024-05-02 VITALS
WEIGHT: 200 LBS | HEIGHT: 63 IN | HEART RATE: 102 BPM | BODY MASS INDEX: 35.44 KG/M2 | RESPIRATION RATE: 15 BRPM | DIASTOLIC BLOOD PRESSURE: 90 MMHG | SYSTOLIC BLOOD PRESSURE: 151 MMHG

## 2024-05-02 DIAGNOSIS — M54.16 RADICULOPATHY, LUMBAR REGION: ICD-10-CM

## 2024-05-02 PROCEDURE — 20611 DRAIN/INJ JOINT/BURSA W/US: CPT | Mod: RT

## 2024-05-02 PROCEDURE — 99214 OFFICE O/P EST MOD 30 MIN: CPT | Mod: 25

## 2024-05-02 NOTE — PHYSICAL EXAM
[FreeTextEntry1] : NAD A&Ox3 Obese  Inspection Right Knee: no bony abnormalities Effusion: slight ROM: 0' ext - 140' flexion w/ pain endROM JLT: ++ MJLT Patella: located; + grindd Stability: stable to V/V stresses Nancy's: painful   ROM L-spine: 3/4 flexion; 10-15 extension w/ pain ROM Hips: smooth IR/ER w/o pain Pelvic tilt: slight Seated slump test: neg left SLR: neg left MARITA's: neg left DTR's: 2+ knees/ankles MMT: 4+/5 L HF 2' pain; 4/5 L TA; 5/5 B/L PF Sensation: Dyesthesia to PP along left LCN thigh distrib Toe & Heel Walk: Difficulty heel walking left Palpation: L GT femur TTP, L GMed tendin TTP, L prox & distal IT band TTP

## 2024-05-02 NOTE — PROCEDURE
[de-identified] : PROCEDURE NOTE  PROCEDURE: US GUIDED RIGHT KNEE ARTHROCENTESIS & CSI PHYSICIAN: NEISHA LEY DO MEDICATIONS INJECTED: 4 CC 1% LIDOCAINE + 40 MG KENALOG (TOTAL) SEDATION MEDICATIONS: NONE BLOOD LOSS: NONE COMPLICATIONS: NONE  TECHNIQUE: R/B/A to US - guided right knee arthrocentesis + corticosteroid injection reviewed w/ patient. Pt. is agreeable and wishes to proceed. Signed consent form to be scanned into EMR.  The patient was placed in a supine position with the knee slightly flexed resting on a rolled towel.  Pre-procedural scanning identified a SMALL knee joint effusion.  A wheal of 1% lidocaine was made 2 FBs superior and inferior to the lateral pole of the patient's RIGHT  patella.  After adequate anesthesia was obtained, an 18-G, 3.0" spinal needle was advanced in plane between the pre-patellar and pre-femoral fat pads.  Once the needle tip was noted to be in proper position, a total of 6.5 cc of serous fluid was aspirated.  The needle was then clamped and the syringe was exchanged to inject the above medications under continuous US guidance. The patient tolerated the procedures well without adverse effects. A bandaid and icepack was applied. Post-injection instructions given.

## 2024-05-02 NOTE — ASSESSMENT
[FreeTextEntry1] : 54 y.o. F w/ h/o C4-7 ACDF (April 2023), L4-S1 PSF (L5-S1 fusion 2015 -> revised in Nov 2021 + L4-5 fusion) with chronic left lateral thigh pain.  I spent most of today's office visit (35 minutes) reviewing the patient's MRI right knee, reviewing and performing the ultrasound guided right knee CSI, discussing etiology, pathogenesis and further nonoperative management.  MRI right knee significant for (1) free edge radial tear involving the body the medial meniscus; (2) focal cartilage loss within the patellofemoral and medial compartments; (3) small knee joint effusion.  I provided the patient with a prescription for physical therapy to focus on pain relieving modalities, gentle range of motion, stretching and strengthening exercises.  Depending upon how the patient proceeds, we may need to consider orthopedic referral for possible arthroscopy.    Regarding the patient's left lateral thigh pain, she is quite pleased with the results of the LCNT hydrodissection. The patient's EMG was highly suspicious for chronic L5 radiculopathy affecting the motor axons.  Her MRI left hip was significant for gluteus medius and minimus tendinosis without tear.  Although the patient appears to have a component of greater trochanteric pain syndrome, I would not direct further treatment at her gluteal tendons at this time.  Should the patient experience more dysesthetic pain radiating down her leg in an L5 distribution, we may then elect to proceed with an L5-S1 transforaminal epidural steroid injection.   I advised a comprehensive weight loss program to help reduce irritation of the nerve in the future. I will discuss these recommendations with Dr. Craig her referring physiatrist.  The patient is otherwise in agreement with the plan.  All questions have been answered.  Return to office 3 months.  This patient is being managed for a complex chronic pain condition that requires ongoing medical management. The nature of this condition requires a longitudinal relationship and monitoring over time for appropriate treatment.

## 2024-05-02 NOTE — HISTORY OF PRESENT ILLNESS
[FreeTextEntry1] : 54 y.o. F w/ h/o C4-7 ACDF (April 2023), L4-S1 PSF (L5-S1 fusion 2015 -> revised in Nov 2021 + L4-5 fusion) with chronic left lateral thigh pain returns to office for MRI right knee review.  Results detailed below.  Pt. states that her right knee pain has worsened over the last 2 weeks.  She has to use a SC for outside ambulation.  OTC pain creams not effective.  She has been taking meloxicam 15 mg which has also not been helpful.

## 2024-05-02 NOTE — DATA REVIEWED
[MRI] : MRI [FreeTextEntry1] : MRI Right Knee (April 2024): 1. Free edge radial tear involving the body the medial meniscus.  2. Focal cartilage loss within the patellofemoral and medial compartments as above.  3. Small knee joint effusion.  X-rays right knee (4/18/24): The medial and lateral joint compartments are well-maintained; there is a slight lateral patellar tilt but no significant osteoarthrosis of the patellofemoral articulation.  The osseous structures do not appear osteopenic.  US (4/16/24) LEFT HEMIPELVIS: Preprocedural scanning revealed an enlarged LCN of the thigh  EMG/NCS LEFT LE (4/2/24): EDX findings are highly suspicious for chronic L5 radiculopathy affecting the motor axons.  MRI Left Hip (March 2024): There is gluteus medius and minimus tendinosis with surrounding peritendinous edema and overlying trace greater trochanteric bursitis. No tendon tear.

## 2024-05-13 ENCOUNTER — RX RENEWAL (OUTPATIENT)
Age: 54
End: 2024-05-13

## 2024-05-13 RX ORDER — MELOXICAM 15 MG/1
15 TABLET ORAL DAILY
Qty: 30 | Refills: 1 | Status: ACTIVE | COMMUNITY
Start: 2024-03-21 | End: 1900-01-01

## 2024-05-15 ENCOUNTER — APPOINTMENT (OUTPATIENT)
Dept: PHYSICAL MEDICINE AND REHAB | Facility: CLINIC | Age: 54
End: 2024-05-15
Payer: MEDICARE

## 2024-05-15 VITALS
DIASTOLIC BLOOD PRESSURE: 71 MMHG | WEIGHT: 208 LBS | HEART RATE: 92 BPM | HEIGHT: 63 IN | SYSTOLIC BLOOD PRESSURE: 106 MMHG | RESPIRATION RATE: 14 BRPM | BODY MASS INDEX: 36.86 KG/M2

## 2024-05-15 DIAGNOSIS — M25.561 PAIN IN RIGHT KNEE: ICD-10-CM

## 2024-05-15 DIAGNOSIS — G57.12 MERALGIA PARESTHETICA, LEFT LOWER LIMB: ICD-10-CM

## 2024-05-15 DIAGNOSIS — M76.00 GLUTEAL TENDINITIS, UNSPECIFIED HIP: ICD-10-CM

## 2024-05-15 DIAGNOSIS — Z98.1 ARTHRODESIS STATUS: ICD-10-CM

## 2024-05-15 PROCEDURE — G2211 COMPLEX E/M VISIT ADD ON: CPT

## 2024-05-15 PROCEDURE — 99214 OFFICE O/P EST MOD 30 MIN: CPT

## 2024-05-15 NOTE — ASSESSMENT
[FreeTextEntry1] : 54 y.o. F w/ h/o C4-7 ACDF (April 2023), L4-S1 PSF (L5-S1 fusion 2015 -> revised in Nov 2021 + L4-5 fusion) with chronic left lateral thigh and right knee pain.  I spent most of today's office visit (30 minutes) discussing etiology, pathogenesis and further nonoperative vs. operative management.  The patient responded quite well to the ultrasound-guided arthrocentesis and corticosteroid injection.  MRI right knee previously reviewed significant for (1) free edge radial tear involving the body the medial meniscus; (2) focal cartilage loss within the patellofemoral and medial compartments; (3) small knee joint effusion.  The patient is set to start her course of physical therapy to focus on pain relieving modalities, gentle range of motion, stretching and strengthening exercises.  Depending upon how the patient responds to further nonoperative management, we may consider an ultrasound-guided PRP injection as a steroid sparing intervention.  In the meantime, the patient may use over-the-counter Voltaren gel 2 to 4 g to the affected area twice daily to 3 times daily as directed.    Regarding the patient's left lateral thigh pain, she is quite pleased with the results of the LCNT hydrodissection. The patient's EMG was highly suspicious for chronic L5 radiculopathy affecting the motor axons.  Her MRI left hip was significant for gluteus medius and minimus tendinosis without tear.  Although the patient appears to have a component of greater trochanteric pain syndrome, I would not direct further treatment at her gluteal tendons at this time.  She may also benefit from physical therapy intervention here as well.  Should the patient experience more dysesthetic pain radiating down her leg in an L5 distribution, we may then elect to proceed with an L5-S1 transforaminal epidural steroid injection.   I advised a comprehensive weight loss program to help reduce irritation of the nerve in the future.  The patient is otherwise in agreement with the plan.  All questions have been answered.  Return to office 3 months.  This patient is being managed for a complex chronic pain condition that requires ongoing medical management. The nature of this condition requires a longitudinal relationship and monitoring over time for appropriate treatment.

## 2024-05-15 NOTE — PHYSICAL EXAM
[FreeTextEntry1] : NAD A&Ox3 Obese  Inspection Right Knee: no bony abnormalities Effusion: (none - resolved) ROM: 0' ext - 140' flexion w/ pain endROM JLT: ++ MJLT Patella: located; + grind Stability: stable to V/V stresses Nancy's: painful   ROM L-spine: 3/4 flexion; 10-15 extension w/ pain ROM Hips: smooth IR/ER w/o pain Pelvic tilt: slight Seated slump test: neg left SLR: neg left MARITA's: neg left DTR's: 2+ knees/ankles MMT: 4+/5 L HF 2' pain; 4/5 L TA; 5/5 B/L PF Sensation: Dyesthesia to PP along left LCN thigh distrib (less) Toe & Heel Walk: Difficulty heel walking left Palpation: L GT femur TTP, L GMed tendin TTP, L prox & distal IT band TTP - static (no change)

## 2024-05-15 NOTE — HISTORY OF PRESENT ILLNESS
[FreeTextEntry1] : 54 y.o. F w/ h/o C4-7 ACDF (April 2023), L4-S1 PSF (L5-S1 fusion 2015 -> revised in Nov 2021 + L4-5 fusion) with chronic left lateral thigh pain returns to office for follow-up status post ultrasound-guided right knee arthrocentesis and corticosteroid injection (5/2/24).  Pt. reports approximately 70% symptomatic improvement post procedure.  She is able to walk more comfortably.  She has yet to start her course of P.T.  Endorses a sensation of "locking" about her left hip when she tries to stretch her leg but denies clicking, clunking or popping.  Still has good pain relief from US guided LCNT hydrodissection (4/16/24).  Denies radiating pain or paresthesia down left leg.

## 2024-07-18 ENCOUNTER — RX RENEWAL (OUTPATIENT)
Age: 54
End: 2024-07-18

## 2024-07-31 NOTE — PATIENT PROFILE ADULT - [id number]
Ambulatory Care Coordination Note     7/31/2024 3:21 PM     Patient outreach attempt by this ACM today to perform care management follow up . ACM was unable to reach the patient by telephone today; left voice message requesting a return phone call to this ACM.     ACM: Kita Dickens RN     Care Summary Note: ACM attempted to contact patient today for follow-up outreach. ACM will contact patient at a later date.     RPM reviewed.  Last 7 day readings:     Vitals      SYSTOLIC DIASTOLIC Pulse   7/25/2024 126  65  79       76    7/26/2024 124  62  85     123  68  80     136  70  80       81    7/27/2024 124  73  76     123  71  76       77    7/28/2024 126  75  71       72    7/29/2024 119  60  82       80    7/30/2024 122  66  75       72    7/31/2024 116  62  79     122  82  74       76            SpO2 Weight - Scale   7/25/2024 92 %  194 lb    7/26/2024 97 %  193 lb 9.6 oz     96 %  192 lb 6.4 oz    7/27/2024 95 %  192 lb 9.6 oz    7/28/2024 98 %  193 lb 3.2 oz    7/29/2024 96 %  192 lb 6.4 oz    7/30/2024 97 %  192 lb 9.6 oz    7/31/2024 97 %  193 lb 3.2 oz          PCP/Specialist follow up:   Future Appointments         Provider Specialty Dept Phone    12/17/2024 2:40 PM Jaki Liang APRN - CNP Family Medicine [phone]    5/8/2025 10:30 AM Jeff Pierce MD Nephrology [phone]    7/28/2025 9:30 AM Adenike Landa APRN - CNP Pulmonology [phone]            Follow Up:   Plan for next ACM outreach in approximately 1 week to complete:  - disease specific assessments  - assess readiness for CM/RPM graduation .           
23:00

## 2024-08-12 ENCOUNTER — APPOINTMENT (OUTPATIENT)
Dept: PHYSICAL MEDICINE AND REHAB | Facility: CLINIC | Age: 54
End: 2024-08-12
Payer: MEDICARE

## 2024-08-12 VITALS
RESPIRATION RATE: 15 BRPM | SYSTOLIC BLOOD PRESSURE: 149 MMHG | DIASTOLIC BLOOD PRESSURE: 90 MMHG | BODY MASS INDEX: 35.44 KG/M2 | HEART RATE: 80 BPM | WEIGHT: 200 LBS | HEIGHT: 63 IN

## 2024-08-12 DIAGNOSIS — Z98.1 ARTHRODESIS STATUS: ICD-10-CM

## 2024-08-12 DIAGNOSIS — G57.12 MERALGIA PARESTHETICA, LEFT LOWER LIMB: ICD-10-CM

## 2024-08-12 PROCEDURE — G2211 COMPLEX E/M VISIT ADD ON: CPT

## 2024-08-12 PROCEDURE — 99214 OFFICE O/P EST MOD 30 MIN: CPT

## 2024-08-12 NOTE — ASSESSMENT
[FreeTextEntry1] : 54 y.o. F w/ h/o C4-7 ACDF (April 2023), L4-S1 PSF (L5-S1 fusion 2015 -> revised in Nov 2021 + L4-5 fusion) with chronic left lateral thigh and right knee pain.  I spent most of today's office visit (30 minutes) discussing etiology, pathogenesis and further nonoperative vs. operative management.  Although the patient responded well to the ultrasound-guided arthrocentesis and corticosteroid injection, she experienced only short-term relief of symptoms.  MRI right knee previously reviewed significant for (1) free edge radial tear involving the body the medial meniscus; (2) focal cartilage loss within the patellofemoral and medial compartments; (3) small knee joint effusion.  As the patient is still having difficulty progressing her physical therapy program, we discussed the risks, benefits and alternatives to an ultrasound-guided PRP injection versus hyaluronic acid injection as steroid sparing interventions.  I explained to the patient that PRP has the strongest literature support for injection based therapies for symptomatic knee osteoarthrosis.  The patient understands that PRP remains investigational and experimental and is not routinely covered under most commercial insurances.  She also understands that should she elect to proceed with a PRP injection, she must avoid all p.o. NSAIDs at least 2 to 3 weeks before and at least 2 to 3 weeks after the procedure.  The patient will consider this option and let us know how she wishes to proceed.  At this time, the patient wishes to avoid surgical intervention.    Regarding the patient's left lateral thigh pain, she continues to benefit from the LCNT hydrodissection. The patient's EMG was highly suspicious for chronic L5 radiculopathy affecting the motor axons.  Her MRI left hip was significant for gluteus medius and minimus tendinosis without tear.  Although the patient appears to have a component of greater trochanteric pain syndrome, I would not direct further treatment at her gluteal tendons at this time.  Should the patient experience more dysesthetic pain radiating down her leg in an L5 distribution, we may then elect to proceed with an L5-S1 transforaminal epidural steroid injection.   I advised a comprehensive weight loss program to help reduce irritation of the nerve in the future.  The patient is otherwise in agreement with the plan.  All questions have been answered.  Return to office 3 months.  This patient is being managed for a complex chronic pain condition that requires ongoing medical management. The nature of this condition requires a longitudinal relationship and monitoring over time for appropriate treatment.

## 2024-08-12 NOTE — HISTORY OF PRESENT ILLNESS
[FreeTextEntry1] : 54 y.o. F w/ h/o C4-7 ACDF (April 2023), L4-S1 PSF (L5-S1 fusion 2015 -> revised in Nov 2021 + L4-5 fusion) with chronic left lateral thigh and right knee pain returns to office for f/u.  Pt. states that her right knee pain continues to bother her despite the aspiration/CSI (on 5/2/24 which provided 3 weeks of symptomatic relief) and P.T.  She has been using Voltaren gel which has not been helpful.  Her left lateral thigh pain is still improved s/p US guided left LCNT hydrodissection (4/16/24).  Her difficulty walking is more limited by her chronic right knee pain than her left thigh pain.

## 2024-08-28 ENCOUNTER — RX RENEWAL (OUTPATIENT)
Age: 54
End: 2024-08-28

## 2024-08-30 ENCOUNTER — APPOINTMENT (OUTPATIENT)
Dept: ORTHOPEDIC SURGERY | Facility: CLINIC | Age: 54
End: 2024-08-30
Payer: MEDICARE

## 2024-08-30 VITALS
BODY MASS INDEX: 35.44 KG/M2 | HEART RATE: 92 BPM | SYSTOLIC BLOOD PRESSURE: 130 MMHG | WEIGHT: 200 LBS | DIASTOLIC BLOOD PRESSURE: 85 MMHG | HEIGHT: 63 IN

## 2024-08-30 DIAGNOSIS — M17.11 UNILATERAL PRIMARY OSTEOARTHRITIS, RIGHT KNEE: ICD-10-CM

## 2024-08-30 DIAGNOSIS — S83.8X1A SPRAIN OF OTHER SPECIFIED PARTS OF RIGHT KNEE, INITIAL ENCOUNTER: ICD-10-CM

## 2024-08-30 PROCEDURE — 99214 OFFICE O/P EST MOD 30 MIN: CPT | Mod: 25

## 2024-08-30 PROCEDURE — 20610 DRAIN/INJ JOINT/BURSA W/O US: CPT | Mod: RT

## 2024-08-30 PROCEDURE — 73564 X-RAY EXAM KNEE 4 OR MORE: CPT | Mod: RT

## 2024-08-30 NOTE — CONSULT LETTER
[Dear  ___] : Dear  [unfilled], [Consult Letter:] : I had the pleasure of evaluating your patient, [unfilled]. [Please see my note below.] : Please see my note below. [Consult Closing:] : Thank you very much for allowing me to participate in the care of this patient.  If you have any questions, please do not hesitate to contact me. [Sincerely,] : Sincerely, [FreeTextEntry2] : MARIBEL DIAZ [FreeTextEntry3] : Jp Wetzel MD Chief of Joint Replacement Primary & Revision Hip and Knee Replacement [facility]

## 2024-08-30 NOTE — PHYSICAL EXAM
[de-identified] :  The patient appears well nourished and in no apparent distress. The patient is alert and oriented to person, place, and time. Affect and mood appear normal. The head is normocephalic and atraumatic. The eyes reveal normal sclera and extra ocular muscles are intact. The tongue is midline with no apparent lesions. Skin shows normal turgor with no evidence of eczema or psoriasis. No respiratory distress noted. Sensation grossly intact. [de-identified] :  Exam of the right knee shows -3 to 115 degrees of flexion measured with a goniometer.  There is an effusion. There is pain with flexion. 5/5 motor strength bilaterally distally. Sensation intact distally. [de-identified] : X-ray: 4 views of the right knee demonstrate medial compartment arthritis  MRI of the right knee done by [facility] on 4/26/24  Impression and results from my independent review in office today and radiology report: 1.  Free edge radial tear involving the body the medial meniscus. 2.  Focal cartilage loss within the patellofemoral and medial compartments as above. 3.  Small knee joint effusion.

## 2024-08-30 NOTE — DISCUSSION/SUMMARY
[de-identified] :  JAMES REIS is a 54 year old female who presents with right knee medial meniscus tear and moderate medial compartment arthritis.  Nonoperative treatment options for knee arthritis were discussed including antiinflammatories, physical therapy, intraarticular cortisone injections, and hyaluronic acid gel injections.  Also partial knee replacement versus arthroscopy was discussed in detail.  A series of hyaluronic acid gel injections was ordered for the patient and are pending insurance approval. The office will follow up with the patient when they become available.  The patient received a cortisone injection in the right knee in the office today. The patient will follow up 6 weeks post injection.

## 2024-08-30 NOTE — HISTORY OF PRESENT ILLNESS
[de-identified] :  JAMES REIS is a 54 year old female who presents with right knee pain.  Her knee pain has been present for at least 1 year.  She notes the pain is mostly medial and is worse with deep flexion as well as weightbearing activity.  She denies any specific falls or trauma.  She has had an MRI earlier this year revealing a medial meniscus tear.  She has not had any surgery thus far.  She has tried anti-inflammatories as well as Tylenol without significant improvement.  She tried physical therapy for more than 6 weeks without improvement.  Approximately 3 months ago she had the knee aspirated and injected with cortisone which worked temporarily.

## 2024-08-30 NOTE — PROCEDURE
[de-identified] : Using sterile technique, 2cc of depomedrol 40mg/ml, 4cc of 1% plain lidocaine, and 2 cc 0.25% marcaine was drawn up into a sterile syringe. The right knee was then sterilely prepped with chlorhexidine. Ethyl chloride spray was used to anesthetize the skin and subQ tissue. The depomedrol/lidocaine/marcaine mixture was then injected into the knee joint in the anterolateral position. The patient tolerated the procedure well without difficulty. The patient was given instructions on the use of ice and anti-inflammatories post injection site soreness.

## 2024-09-05 ENCOUNTER — RX RENEWAL (OUTPATIENT)
Age: 54
End: 2024-09-05

## 2024-09-16 ENCOUNTER — APPOINTMENT (OUTPATIENT)
Dept: PHYSICAL MEDICINE AND REHAB | Facility: CLINIC | Age: 54
End: 2024-09-16

## 2024-09-20 RX ORDER — HYLAN G-F 20 16MG/2ML
16 SYRINGE (ML) INTRAARTICULAR
Qty: 1 | Refills: 0 | Status: ACTIVE | OUTPATIENT
Start: 2024-09-12

## 2024-09-26 ENCOUNTER — APPOINTMENT (OUTPATIENT)
Dept: ORTHOPEDIC SURGERY | Facility: CLINIC | Age: 54
End: 2024-09-26

## 2024-09-26 VITALS
TEMPERATURE: 98.7 F | WEIGHT: 200 LBS | HEART RATE: 76 BPM | DIASTOLIC BLOOD PRESSURE: 87 MMHG | BODY MASS INDEX: 35.44 KG/M2 | SYSTOLIC BLOOD PRESSURE: 136 MMHG | HEIGHT: 63 IN

## 2024-09-26 DIAGNOSIS — M51.36 OTHER INTERVERTEBRAL DISC DEGENERATION, LUMBAR REGION: ICD-10-CM

## 2024-09-26 DIAGNOSIS — Z98.1 ARTHRODESIS STATUS: ICD-10-CM

## 2024-09-26 DIAGNOSIS — M54.50 LOW BACK PAIN, UNSPECIFIED: ICD-10-CM

## 2024-09-26 DIAGNOSIS — M47.812 SPONDYLOSIS W/OUT MYELOPATHY OR RADICULOPATHY, CERVICAL REGION: ICD-10-CM

## 2024-09-26 DIAGNOSIS — M79.605 LOW BACK PAIN, UNSPECIFIED: ICD-10-CM

## 2024-09-26 DIAGNOSIS — F32.A DEPRESSION, UNSPECIFIED: ICD-10-CM

## 2024-09-26 PROCEDURE — 20552 NJX 1/MLT TRIGGER POINT 1/2: CPT

## 2024-09-26 PROCEDURE — 72110 X-RAY EXAM L-2 SPINE 4/>VWS: CPT

## 2024-09-26 PROCEDURE — 99214 OFFICE O/P EST MOD 30 MIN: CPT | Mod: 24,25

## 2024-09-26 RX ORDER — METHYLPREDNISOLONE 4 MG/1
4 TABLET ORAL
Qty: 1 | Refills: 0 | Status: ACTIVE | COMMUNITY
Start: 2024-09-26 | End: 1900-01-01

## 2024-09-26 RX ORDER — KETOROLAC TROMETHAMINE 10 MG/1
10 TABLET, FILM COATED ORAL EVERY 6 HOURS
Qty: 20 | Refills: 0 | Status: ACTIVE | COMMUNITY
Start: 2024-09-26 | End: 1900-01-01

## 2024-09-26 NOTE — PHYSICAL EXAM
[Antalgic] : antalgic [de-identified] : CONSTITUTIONAL: The patient is a very pleasant individual who is well-nourished and who appears stated age. PSYCHIATRIC: The patient is alert and oriented X 3 and in no apparent distress, and participates with orthopedic evaluation well. HEAD: Atraumatic and is nonsyndromic in appearance. EENT: No visible thyromegaly, EOMI. RESPIRATORY: Respiratory rate is regular, not dyspneic on examination. LYMPHATICS: There is no inguinal lymphadenopathy INTEGUMENTARY: Skin is clean, dry, and intact about the bilateral lower extremities and lumbar spine. VASCULAR: There is brisk capillary refill about the bilateral lower extremities. NEUROLOGIC: There are no pathologic reflexes. There is no decrease in sensation of the bilateral lower extremities on manual  examination. Deep tendon reflexes are well maintained at 2+/4 of the bilateral lower extremities and are symmetric.. MUSCULOSKELETAL: There is no visible muscular atrophy. Manual motor strength is well maintained in the bilateral lower extremities. Range of motion of lumbar spine is well maintained. The patient ambulates in a non-myelopathic manner. Positive tension sign and straight leg raise on the right, negative on the left. Quad extension, ankle dorsiflexion, EHL, plantar flexion, and ankle eversion are well preserved. Normal secondary orthopaedic exam of bilateral hips, greater trochanteric area, knees and ankles. 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

## 2024-09-26 NOTE — DISCUSSION/SUMMARY
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 35 minutes was spent reviewing the x-rays as well as discussing with the patient their clinical presentation, diagnosis and providing education. Conservative treatment was discussed with the patient at length. Anticipatory guidance regarding disease process, avoidance of acute exacerbation this was discussed at length and all patients commenting concerns were answered to the patient's satisfaction.  Patient underwent a trigger point and Toradol injection in the office today and tolerated well.  At this point based on continued pain, recent history of stated bowel incontinence secondary to pain opiate now improved, as well as previous fusion and MRI lumbar spine is ordered medically necessary to evaluate adjacent segment disease spinal stenosis that may necessitate additional treatment modalities.  In the interim continue with activities as tolerated/home exercising.  She will be prescribed p.o. Toradol x 5 days followed by Medrol Dosepak for anti-inflammatory properties.  Advised on sufficient hydration during this point in time.  No other anti-inflammatories.  She can take Tylenol as needed for additional pain relief.  Tach modalities heat, ice, lidocaine patches, etc. was also recommended.  The patient will follow-up after MRI to review results discuss treatment options.  At next visit we will obtain cervical AP lateral films to evaluate prior cervical fusion.  All questions and concerns were addressed with the patient and they are in agreement with this plan.   This note was generated using dragon medical dictation software. A reasonable effort has been made for proofreading its contents, but typos may still remain. If there are any questions or points of clarification needed please notify my office.

## 2024-09-26 NOTE — PROCEDURE
[de-identified] : After full discussion of treatment alternatives, and associated risks, complications, limitations, and expectations, and with patient consent, a trigger point injection was administered. The affected superficial muscular trigger point area was cleansed with alcohol prep x3. After this, ethyl chloride was used as a local anesthetic. Following sterile prep and with sterile method, 1 cc of 1% lidocaine without epinephrine, 1 cc of Toradol and 10 mg depomedrol were injected into the bilateral lumbar paraspinal affected trigger point. The patient tolerated the procedure well. Dry sterile dressing was placed and the patient described relief in symptoms after 5 minutes. No adverse effects were noted.

## 2024-10-04 ENCOUNTER — APPOINTMENT (OUTPATIENT)
Dept: ORTHOPEDIC SURGERY | Facility: CLINIC | Age: 54
End: 2024-10-04
Payer: MEDICARE

## 2024-10-04 VITALS — BODY MASS INDEX: 35.44 KG/M2 | WEIGHT: 200 LBS | HEIGHT: 63 IN

## 2024-10-04 DIAGNOSIS — M17.11 UNILATERAL PRIMARY OSTEOARTHRITIS, RIGHT KNEE: ICD-10-CM

## 2024-10-04 PROCEDURE — 20610 DRAIN/INJ JOINT/BURSA W/O US: CPT | Mod: RT

## 2024-10-04 NOTE — PHYSICAL EXAM
[de-identified] :  The patient appears well nourished and in no apparent distress. The patient is alert and oriented to person, place, and time. Affect and mood appear normal. The head is normocephalic and atraumatic. The eyes reveal normal sclera and extra ocular muscles are intact. The tongue is midline with no apparent lesions. Skin shows normal turgor with no evidence of eczema or psoriasis. No respiratory distress noted. Sensation grossly intact. [de-identified] :  Exam of the right knee shows -3 to 115 degrees of flexion measured with a goniometer.  There is an effusion. There is pain with flexion. 5/5 motor strength bilaterally distally. Sensation intact distally. [de-identified] : Prior X-ray: 4 views of the right knee demonstrate medial compartment arthritis  MRI of the right knee done by [facility] on 4/26/24  Impression and results from my independent review in office today and radiology report: 1.  Free edge radial tear involving the body the medial meniscus. 2.  Focal cartilage loss within the patellofemoral and medial compartments as above. 3.  Small knee joint effusion.

## 2024-10-04 NOTE — PROCEDURE
[de-identified] : Allergies: The patient denies allergies to medications and has no allergies to chicken,eggs, or feathers. Procedure: The patient has been identified by name and date of birth. Patient confirms that we are treating the right knee today. The knee was prepped in the usual sterile fashion. The areas were cleansed with chlorhexadine, then sprayed with ethyl chloride. The patient was then injected with the Synvisc into the right knee in the anterolateral position. The patient tolerated the procedure well. The medication was delivered aseptically and atraumatically. Diagnosis: Osteoarthritis of the right knee Treatment: The patient was advised on the activities for today. I gave the patient instructions on postinjection ice and analgesia.

## 2024-10-04 NOTE — DISCUSSION/SUMMARY
[de-identified] :  JAMES REIS is a 54 year old female who presents with right knee medial meniscus tear and moderate medial compartment arthritis.  Nonoperative treatment options for knee arthritis were discussed including antiinflammatories, physical therapy, intraarticular cortisone injections, and hyaluronic acid gel injections.  She received the first of three gel injections to the right knee and tolerated well. Follow up in one week.

## 2024-10-04 NOTE — HISTORY OF PRESENT ILLNESS
[de-identified] :  JAMES REIS is a 54 year old female who presents with right knee pain.  Her knee pain has been present for at least 1 year.  She notes the pain is mostly medial and is worse with deep flexion as well as weightbearing activity.  She denies any specific falls or trauma.  She has had an MRI earlier this year revealing a medial meniscus tear.  She has not had any surgery thus far.  She has tried anti-inflammatories as well as Tylenol without significant improvement.  She tried physical therapy for more than 6 weeks without improvement. She had a steroid injection to the right knee 6 weeks ago which worked well. She presents today to begin gel injections.

## 2024-10-05 ENCOUNTER — OUTPATIENT (OUTPATIENT)
Dept: OUTPATIENT SERVICES | Facility: HOSPITAL | Age: 54
LOS: 1 days | End: 2024-10-05
Payer: MEDICARE

## 2024-10-05 ENCOUNTER — APPOINTMENT (OUTPATIENT)
Dept: MRI IMAGING | Facility: CLINIC | Age: 54
End: 2024-10-05

## 2024-10-05 DIAGNOSIS — Z00.8 ENCOUNTER FOR OTHER GENERAL EXAMINATION: ICD-10-CM

## 2024-10-05 DIAGNOSIS — M51.369 OTHER INTERVERTEBRAL DISC DEGENERATION, LUMBAR REGION WITHOUT MENTION OF LUMBAR BACK PAIN OR LOWER EXTREMITY PAIN: ICD-10-CM

## 2024-10-05 DIAGNOSIS — Z98.1 ARTHRODESIS STATUS: ICD-10-CM

## 2024-10-05 DIAGNOSIS — Z98.1 ARTHRODESIS STATUS: Chronic | ICD-10-CM

## 2024-10-05 DIAGNOSIS — Z90.711 ACQUIRED ABSENCE OF UTERUS WITH REMAINING CERVICAL STUMP: Chronic | ICD-10-CM

## 2024-10-05 PROCEDURE — 72148 MRI LUMBAR SPINE W/O DYE: CPT | Mod: 26

## 2024-10-05 PROCEDURE — 72148 MRI LUMBAR SPINE W/O DYE: CPT

## 2024-10-09 ENCOUNTER — APPOINTMENT (OUTPATIENT)
Dept: ORTHOPEDIC SURGERY | Facility: CLINIC | Age: 54
End: 2024-10-09
Payer: MEDICARE

## 2024-10-09 VITALS — HEIGHT: 63 IN | WEIGHT: 200 LBS | BODY MASS INDEX: 35.44 KG/M2

## 2024-10-09 DIAGNOSIS — S83.8X1A SPRAIN OF OTHER SPECIFIED PARTS OF RIGHT KNEE, INITIAL ENCOUNTER: ICD-10-CM

## 2024-10-09 PROCEDURE — 20610 DRAIN/INJ JOINT/BURSA W/O US: CPT | Mod: RT

## 2024-10-11 ENCOUNTER — APPOINTMENT (OUTPATIENT)
Dept: ORTHOPEDIC SURGERY | Facility: CLINIC | Age: 54
End: 2024-10-11
Payer: MEDICARE

## 2024-10-11 VITALS
WEIGHT: 200 LBS | HEART RATE: 108 BPM | DIASTOLIC BLOOD PRESSURE: 85 MMHG | HEIGHT: 63 IN | BODY MASS INDEX: 35.44 KG/M2 | SYSTOLIC BLOOD PRESSURE: 144 MMHG

## 2024-10-11 DIAGNOSIS — Z98.1 ARTHRODESIS STATUS: ICD-10-CM

## 2024-10-11 PROCEDURE — 99214 OFFICE O/P EST MOD 30 MIN: CPT

## 2024-10-11 PROCEDURE — 72040 X-RAY EXAM NECK SPINE 2-3 VW: CPT

## 2024-10-15 ENCOUNTER — APPOINTMENT (OUTPATIENT)
Dept: PHYSICAL MEDICINE AND REHAB | Facility: CLINIC | Age: 54
End: 2024-10-15
Payer: MEDICARE

## 2024-10-15 VITALS
RESPIRATION RATE: 15 BRPM | DIASTOLIC BLOOD PRESSURE: 93 MMHG | HEIGHT: 63 IN | HEART RATE: 84 BPM | WEIGHT: 200 LBS | BODY MASS INDEX: 35.44 KG/M2 | SYSTOLIC BLOOD PRESSURE: 136 MMHG

## 2024-10-15 DIAGNOSIS — M79.18 MYALGIA, OTHER SITE: ICD-10-CM

## 2024-10-15 DIAGNOSIS — M51.369: ICD-10-CM

## 2024-10-15 DIAGNOSIS — Z98.1 ARTHRODESIS STATUS: ICD-10-CM

## 2024-10-15 PROCEDURE — 99214 OFFICE O/P EST MOD 30 MIN: CPT

## 2024-10-15 RX ORDER — GABAPENTIN 800 MG/1
800 TABLET, COATED ORAL
Refills: 0 | Status: ACTIVE | COMMUNITY

## 2024-10-15 RX ORDER — CYCLOBENZAPRINE HYDROCHLORIDE 5 MG/1
5 TABLET, FILM COATED ORAL
Qty: 90 | Refills: 0 | Status: ACTIVE | COMMUNITY
Start: 2024-10-15 | End: 1900-01-01

## 2024-10-18 ENCOUNTER — APPOINTMENT (OUTPATIENT)
Dept: ORTHOPEDIC SURGERY | Facility: CLINIC | Age: 54
End: 2024-10-18
Payer: MEDICARE

## 2024-10-18 VITALS — BODY MASS INDEX: 35.44 KG/M2 | HEIGHT: 63 IN | WEIGHT: 200 LBS

## 2024-10-18 DIAGNOSIS — M17.11 UNILATERAL PRIMARY OSTEOARTHRITIS, RIGHT KNEE: ICD-10-CM

## 2024-10-18 PROCEDURE — 20610 DRAIN/INJ JOINT/BURSA W/O US: CPT | Mod: RT

## 2024-11-26 ENCOUNTER — APPOINTMENT (OUTPATIENT)
Dept: PHYSICAL MEDICINE AND REHAB | Facility: CLINIC | Age: 54
End: 2024-11-26

## 2024-11-27 ENCOUNTER — APPOINTMENT (OUTPATIENT)
Dept: ORTHOPEDIC SURGERY | Facility: CLINIC | Age: 54
End: 2024-11-27

## 2024-12-11 NOTE — HISTORY OF PRESENT ILLNESS
Lab Results   Component Value Date    HGBA1C 5.7 11/19/2024    HGBA1C 6.0 11/27/2023    LDL 69.00 11/19/2024    CREATININE 1.37 (H) 11/19/2024       Discussed importance of diabetes prevention through lifestyle and dietary modifications.    Advised of ADA, low carb, low-fat, low-cholesterol diet, and importance of portion control.  Advised of increasing aerobic activity as tolerated and healthy weight maintenance.  Avoid soda, simple sweets, and limit rice/pasta/breads/starches (no more than 45-50 grams per meal).  Maintain healthy weight with goal BMI <30.  Exercise 5 times per week for 30 minutes per day.  Discussed importance of lifestyle and dietary modifications to prevent diabetes.         [Worsening] : worsening [___ yrs] : [unfilled] year(s) ago [10] : a maximum pain level of 10/10 [Constant] : ~He/She~ states the symptoms seem to be constant [Bending] : worsened by bending [Lifting] : worsened by lifting [Rest] : relieved by rest [de-identified] : Kelly is a very pleasant woman well-known to us with regard to lumbar spondylosis she is status post two-level lumbar fusion 4 5 and 5 1 backless recent lumbar revision was 2021 her primary complaint at this point is cervical pain cervical spondylosis with a known diagnosis.  She has been under physical therapy chiropractic pain management her pain management provider is Dr. Plaza she is also noticing radiculopathy and dorsum of her hands.  DEE questionnaire thankfully is negative [Ataxia] : no ataxia [Incontinence] : no incontinence [Loss of Dexterity] : good dexterity [Urinary Ret.] : no urinary retention

## 2024-12-21 ENCOUNTER — APPOINTMENT (OUTPATIENT)
Dept: OBGYN | Facility: CLINIC | Age: 54
End: 2024-12-21
Payer: MEDICARE

## 2024-12-21 VITALS
HEIGHT: 63 IN | DIASTOLIC BLOOD PRESSURE: 112 MMHG | BODY MASS INDEX: 36.86 KG/M2 | HEART RATE: 80 BPM | SYSTOLIC BLOOD PRESSURE: 157 MMHG | WEIGHT: 208 LBS

## 2024-12-21 DIAGNOSIS — Z81.8 FAMILY HISTORY OF OTHER MENTAL AND BEHAVIORAL DISORDERS: ICD-10-CM

## 2024-12-21 DIAGNOSIS — Z83.511 FAMILY HISTORY OF GLAUCOMA: ICD-10-CM

## 2024-12-21 DIAGNOSIS — B37.31 ACUTE CANDIDIASIS OF VULVA AND VAGINA: ICD-10-CM

## 2024-12-21 DIAGNOSIS — M19.90 UNSPECIFIED OSTEOARTHRITIS, UNSPECIFIED SITE: ICD-10-CM

## 2024-12-21 DIAGNOSIS — F41.9 ANXIETY DISORDER, UNSPECIFIED: ICD-10-CM

## 2024-12-21 DIAGNOSIS — Z80.41 FAMILY HISTORY OF MALIGNANT NEOPLASM OF OVARY: ICD-10-CM

## 2024-12-21 DIAGNOSIS — F32.A ANXIETY DISORDER, UNSPECIFIED: ICD-10-CM

## 2024-12-21 DIAGNOSIS — Z81.3 FAMILY HISTORY OF OTHER PSYCHOACTIVE SUBSTANCE ABUSE AND DEPENDENCE: ICD-10-CM

## 2024-12-21 DIAGNOSIS — Z83.3 FAMILY HISTORY OF DIABETES MELLITUS: ICD-10-CM

## 2024-12-21 PROCEDURE — 99203 OFFICE O/P NEW LOW 30 MIN: CPT

## 2024-12-21 RX ORDER — CLOTRIMAZOLE AND BETAMETHASONE DIPROPIONATE 10; .5 MG/G; MG/G
1-0.05 CREAM TOPICAL TWICE DAILY
Qty: 1 | Refills: 2 | Status: ACTIVE | COMMUNITY
Start: 2024-12-21 | End: 1900-01-01

## 2024-12-21 RX ORDER — MIRTAZAPINE 7.5 MG/1
7.5 TABLET, FILM COATED ORAL
Refills: 0 | Status: ACTIVE | COMMUNITY

## 2024-12-21 RX ORDER — FLUCONAZOLE 150 MG/1
150 TABLET ORAL
Qty: 2 | Refills: 2 | Status: ACTIVE | COMMUNITY
Start: 2024-12-21 | End: 1900-01-01

## 2025-01-06 ENCOUNTER — APPOINTMENT (OUTPATIENT)
Dept: OBGYN | Facility: CLINIC | Age: 55
End: 2025-01-06
Payer: MEDICARE

## 2025-01-09 ENCOUNTER — APPOINTMENT (OUTPATIENT)
Dept: OBGYN | Facility: CLINIC | Age: 55
End: 2025-01-09
Payer: MEDICARE

## 2025-01-09 VITALS
WEIGHT: 208 LBS | BODY MASS INDEX: 36.86 KG/M2 | SYSTOLIC BLOOD PRESSURE: 126 MMHG | DIASTOLIC BLOOD PRESSURE: 81 MMHG | HEIGHT: 63 IN

## 2025-01-09 DIAGNOSIS — N95.1 MENOPAUSAL AND FEMALE CLIMACTERIC STATES: ICD-10-CM

## 2025-01-09 DIAGNOSIS — Z01.419 ENCOUNTER FOR GYNECOLOGICAL EXAMINATION (GENERAL) (ROUTINE) W/OUT ABNORMAL FINDINGS: ICD-10-CM

## 2025-01-09 DIAGNOSIS — Z78.0 ENCOUNTER FOR SCREENING FOR OSTEOPOROSIS: ICD-10-CM

## 2025-01-09 DIAGNOSIS — Z12.31 ENCOUNTER FOR SCREENING MAMMOGRAM FOR MALIGNANT NEOPLASM OF BREAST: ICD-10-CM

## 2025-01-09 DIAGNOSIS — Z13.820 ENCOUNTER FOR SCREENING FOR OSTEOPOROSIS: ICD-10-CM

## 2025-01-09 PROCEDURE — 99396 PREV VISIT EST AGE 40-64: CPT

## 2025-01-09 PROCEDURE — 99214 OFFICE O/P EST MOD 30 MIN: CPT | Mod: 25

## 2025-01-09 RX ORDER — ESTRADIOL 0.04 MG/D
0.04 PATCH TRANSDERMAL
Qty: 3 | Refills: 0 | Status: ACTIVE | COMMUNITY
Start: 2025-01-09 | End: 1900-01-01

## 2025-01-13 LAB — HPV HIGH+LOW RISK DNA PNL CVX: NOT DETECTED

## 2025-01-15 ENCOUNTER — APPOINTMENT (OUTPATIENT)
Dept: ORTHOPEDIC SURGERY | Facility: CLINIC | Age: 55
End: 2025-01-15

## 2025-01-16 LAB — CYTOLOGY CVX/VAG DOC THIN PREP: NORMAL

## 2025-01-27 RX ORDER — ESTRADIOL 0.04 MG/D
0.04 PATCH, EXTENDED RELEASE TRANSDERMAL
Qty: 3 | Refills: 0 | Status: ACTIVE | COMMUNITY
Start: 2025-01-27 | End: 1900-01-01

## 2025-01-29 ENCOUNTER — APPOINTMENT (OUTPATIENT)
Dept: ORTHOPEDIC SURGERY | Facility: CLINIC | Age: 55
End: 2025-01-29
Payer: MEDICARE

## 2025-01-29 PROCEDURE — 99212 OFFICE O/P EST SF 10 MIN: CPT

## 2025-01-29 RX ORDER — MELOXICAM 15 MG/1
15 TABLET ORAL DAILY
Qty: 15 | Refills: 1 | Status: ACTIVE | COMMUNITY
Start: 2025-01-29 | End: 1900-01-01

## 2025-01-30 DIAGNOSIS — M25.512 PAIN IN LEFT SHOULDER: ICD-10-CM

## 2025-01-31 ENCOUNTER — APPOINTMENT (OUTPATIENT)
Dept: ORTHOPEDIC SURGERY | Facility: CLINIC | Age: 55
End: 2025-01-31
Payer: MEDICARE

## 2025-01-31 VITALS — HEIGHT: 63 IN | BODY MASS INDEX: 36.86 KG/M2 | WEIGHT: 208 LBS

## 2025-01-31 DIAGNOSIS — M25.512 PAIN IN LEFT SHOULDER: ICD-10-CM

## 2025-01-31 DIAGNOSIS — M75.50 BURSITIS OF UNSPECIFIED SHOULDER: ICD-10-CM

## 2025-01-31 DIAGNOSIS — M75.90 BURSITIS OF UNSPECIFIED SHOULDER: ICD-10-CM

## 2025-01-31 DIAGNOSIS — G56.02 CARPAL TUNNEL SYNDROME, LEFT UPPER LIMB: ICD-10-CM

## 2025-01-31 PROCEDURE — 99214 OFFICE O/P EST MOD 30 MIN: CPT | Mod: 25

## 2025-01-31 PROCEDURE — 20526 THER INJECTION CARP TUNNEL: CPT | Mod: LT

## 2025-01-31 PROCEDURE — 73030 X-RAY EXAM OF SHOULDER: CPT | Mod: LT

## 2025-01-31 PROCEDURE — 20610 DRAIN/INJ JOINT/BURSA W/O US: CPT | Mod: 59,LT

## 2025-03-14 ENCOUNTER — APPOINTMENT (OUTPATIENT)
Dept: ORTHOPEDIC SURGERY | Facility: CLINIC | Age: 55
End: 2025-03-14
Payer: MEDICARE

## 2025-03-14 DIAGNOSIS — M75.90 BURSITIS OF UNSPECIFIED SHOULDER: ICD-10-CM

## 2025-03-14 DIAGNOSIS — M25.512 PAIN IN LEFT SHOULDER: ICD-10-CM

## 2025-03-14 DIAGNOSIS — M75.50 BURSITIS OF UNSPECIFIED SHOULDER: ICD-10-CM

## 2025-03-14 DIAGNOSIS — G56.02 CARPAL TUNNEL SYNDROME, LEFT UPPER LIMB: ICD-10-CM

## 2025-03-14 PROCEDURE — 20526 THER INJECTION CARP TUNNEL: CPT | Mod: LT

## 2025-03-14 PROCEDURE — 20610 DRAIN/INJ JOINT/BURSA W/O US: CPT | Mod: 59,LT

## 2025-03-14 PROCEDURE — 99214 OFFICE O/P EST MOD 30 MIN: CPT | Mod: 25

## 2025-03-18 ENCOUNTER — RX RENEWAL (OUTPATIENT)
Age: 55
End: 2025-03-18

## 2025-04-04 DIAGNOSIS — M17.11 UNILATERAL PRIMARY OSTEOARTHRITIS, RIGHT KNEE: ICD-10-CM

## 2025-04-10 ENCOUNTER — APPOINTMENT (OUTPATIENT)
Dept: OBGYN | Facility: CLINIC | Age: 55
End: 2025-04-10

## 2025-04-16 ENCOUNTER — APPOINTMENT (OUTPATIENT)
Dept: ORTHOPEDIC SURGERY | Facility: CLINIC | Age: 55
End: 2025-04-16

## 2025-04-18 ENCOUNTER — APPOINTMENT (OUTPATIENT)
Dept: OBGYN | Facility: CLINIC | Age: 55
End: 2025-04-18
Payer: MEDICARE

## 2025-04-18 VITALS
HEIGHT: 63 IN | SYSTOLIC BLOOD PRESSURE: 140 MMHG | BODY MASS INDEX: 36.86 KG/M2 | WEIGHT: 208 LBS | DIASTOLIC BLOOD PRESSURE: 80 MMHG

## 2025-04-18 DIAGNOSIS — Z79.890 HORMONE REPLACEMENT THERAPY: ICD-10-CM

## 2025-04-18 PROCEDURE — 99214 OFFICE O/P EST MOD 30 MIN: CPT

## 2025-05-08 ENCOUNTER — APPOINTMENT (OUTPATIENT)
Dept: ORTHOPEDIC SURGERY | Facility: CLINIC | Age: 55
End: 2025-05-08
Payer: MEDICARE

## 2025-05-08 VITALS
BODY MASS INDEX: 36.86 KG/M2 | SYSTOLIC BLOOD PRESSURE: 135 MMHG | DIASTOLIC BLOOD PRESSURE: 81 MMHG | WEIGHT: 208 LBS | HEIGHT: 63 IN

## 2025-05-08 DIAGNOSIS — M17.11 UNILATERAL PRIMARY OSTEOARTHRITIS, RIGHT KNEE: ICD-10-CM

## 2025-05-08 PROCEDURE — 20610 DRAIN/INJ JOINT/BURSA W/O US: CPT | Mod: RT

## 2025-05-08 PROCEDURE — 99214 OFFICE O/P EST MOD 30 MIN: CPT | Mod: 25

## 2025-05-08 PROCEDURE — 73564 X-RAY EXAM KNEE 4 OR MORE: CPT | Mod: RT

## 2025-05-13 RX ORDER — HYLAN G-F 20 16MG/2ML
16 SYRINGE (ML) INTRAARTICULAR
Qty: 2 | Refills: 0 | Status: ACTIVE | OUTPATIENT
Start: 2025-05-08

## 2025-05-16 ENCOUNTER — APPOINTMENT (OUTPATIENT)
Dept: ORTHOPEDIC SURGERY | Facility: CLINIC | Age: 55
End: 2025-05-16
Payer: MEDICARE

## 2025-05-16 DIAGNOSIS — G56.02 CARPAL TUNNEL SYNDROME, LEFT UPPER LIMB: ICD-10-CM

## 2025-05-16 DIAGNOSIS — M75.90 BURSITIS OF UNSPECIFIED SHOULDER: ICD-10-CM

## 2025-05-16 DIAGNOSIS — M75.50 BURSITIS OF UNSPECIFIED SHOULDER: ICD-10-CM

## 2025-05-16 PROCEDURE — 99213 OFFICE O/P EST LOW 20 MIN: CPT

## 2025-05-16 RX ORDER — DICLOFENAC SODIUM 10 MG/G
1 GEL TOPICAL DAILY
Qty: 1 | Refills: 0 | Status: ACTIVE | COMMUNITY
Start: 2025-05-16 | End: 1900-01-01

## 2025-05-22 ENCOUNTER — APPOINTMENT (OUTPATIENT)
Dept: ORTHOPEDIC SURGERY | Facility: CLINIC | Age: 55
End: 2025-05-22
Payer: MEDICARE

## 2025-05-22 PROCEDURE — 99213 OFFICE O/P EST LOW 20 MIN: CPT | Mod: 25

## 2025-05-22 PROCEDURE — 20610 DRAIN/INJ JOINT/BURSA W/O US: CPT | Mod: RT

## 2025-05-29 ENCOUNTER — APPOINTMENT (OUTPATIENT)
Dept: ORTHOPEDIC SURGERY | Facility: CLINIC | Age: 55
End: 2025-05-29
Payer: MEDICARE

## 2025-05-29 ENCOUNTER — RX RENEWAL (OUTPATIENT)
Age: 55
End: 2025-05-29

## 2025-05-29 DIAGNOSIS — N76.2 ACUTE VULVITIS: ICD-10-CM

## 2025-05-29 PROCEDURE — 20610 DRAIN/INJ JOINT/BURSA W/O US: CPT | Mod: RT

## 2025-06-02 ENCOUNTER — APPOINTMENT (OUTPATIENT)
Dept: ORTHOPEDIC SURGERY | Facility: CLINIC | Age: 55
End: 2025-06-02
Payer: MEDICARE

## 2025-06-02 DIAGNOSIS — G56.02 CARPAL TUNNEL SYNDROME, LEFT UPPER LIMB: ICD-10-CM

## 2025-06-02 DIAGNOSIS — M25.612 STIFFNESS OF LEFT SHOULDER, NOT ELSEWHERE CLASSIFIED: ICD-10-CM

## 2025-06-02 DIAGNOSIS — M77.8 OTHER ENTHESOPATHIES, NOT ELSEWHERE CLASSIFIED: ICD-10-CM

## 2025-06-02 PROCEDURE — 20526 THER INJECTION CARP TUNNEL: CPT | Mod: LT

## 2025-06-02 PROCEDURE — 20610 DRAIN/INJ JOINT/BURSA W/O US: CPT | Mod: LT

## 2025-06-02 PROCEDURE — 99214 OFFICE O/P EST MOD 30 MIN: CPT | Mod: 25

## 2025-06-06 ENCOUNTER — APPOINTMENT (OUTPATIENT)
Dept: ORTHOPEDIC SURGERY | Facility: CLINIC | Age: 55
End: 2025-06-06
Payer: MEDICARE

## 2025-06-06 VITALS — WEIGHT: 208 LBS | BODY MASS INDEX: 36.86 KG/M2 | HEIGHT: 63 IN

## 2025-06-06 PROCEDURE — 20610 DRAIN/INJ JOINT/BURSA W/O US: CPT | Mod: RT

## 2025-06-24 ENCOUNTER — RX RENEWAL (OUTPATIENT)
Age: 55
End: 2025-06-24

## 2025-07-02 ENCOUNTER — APPOINTMENT (OUTPATIENT)
Dept: ORTHOPEDIC SURGERY | Facility: CLINIC | Age: 55
End: 2025-07-02
Payer: MEDICARE

## 2025-07-02 PROCEDURE — 20526 THER INJECTION CARP TUNNEL: CPT | Mod: LT

## 2025-07-02 PROCEDURE — 99214 OFFICE O/P EST MOD 30 MIN: CPT | Mod: 25

## 2025-07-02 PROCEDURE — 20610 DRAIN/INJ JOINT/BURSA W/O US: CPT | Mod: 59,LT

## 2025-07-16 ENCOUNTER — APPOINTMENT (OUTPATIENT)
Dept: ORTHOPEDIC SURGERY | Facility: CLINIC | Age: 55
End: 2025-07-16
Payer: MEDICARE

## 2025-07-16 VITALS
WEIGHT: 209 LBS | HEIGHT: 63 IN | DIASTOLIC BLOOD PRESSURE: 92 MMHG | SYSTOLIC BLOOD PRESSURE: 136 MMHG | BODY MASS INDEX: 37.03 KG/M2

## 2025-07-16 PROCEDURE — G2211 COMPLEX E/M VISIT ADD ON: CPT

## 2025-07-16 PROCEDURE — 99213 OFFICE O/P EST LOW 20 MIN: CPT

## 2025-07-16 RX ORDER — MELOXICAM 7.5 MG/1
7.5 TABLET ORAL
Qty: 60 | Refills: 0 | Status: ACTIVE | COMMUNITY
Start: 2025-07-16 | End: 1900-01-01

## 2025-07-31 ENCOUNTER — RX RENEWAL (OUTPATIENT)
Age: 55
End: 2025-07-31

## 2025-08-07 ENCOUNTER — APPOINTMENT (OUTPATIENT)
Dept: ORTHOPEDIC SURGERY | Facility: CLINIC | Age: 55
End: 2025-08-07

## 2025-08-07 VITALS
HEART RATE: 89 BPM | HEIGHT: 63 IN | SYSTOLIC BLOOD PRESSURE: 136 MMHG | BODY MASS INDEX: 37.03 KG/M2 | WEIGHT: 209 LBS | DIASTOLIC BLOOD PRESSURE: 87 MMHG

## 2025-08-07 DIAGNOSIS — Z98.1 ARTHRODESIS STATUS: ICD-10-CM

## 2025-08-07 DIAGNOSIS — G56.02 CARPAL TUNNEL SYNDROME, LEFT UPPER LIMB: ICD-10-CM

## 2025-08-07 DIAGNOSIS — M51.369: ICD-10-CM

## 2025-08-07 DIAGNOSIS — M75.82 OTHER SHOULDER LESIONS, LEFT SHOULDER: ICD-10-CM

## 2025-08-07 PROCEDURE — 99214 OFFICE O/P EST MOD 30 MIN: CPT | Mod: 24,25

## 2025-08-07 PROCEDURE — 72100 X-RAY EXAM L-S SPINE 2/3 VWS: CPT

## 2025-08-07 PROCEDURE — 20552 NJX 1/MLT TRIGGER POINT 1/2: CPT | Mod: RT

## 2025-08-07 PROCEDURE — 72040 X-RAY EXAM NECK SPINE 2-3 VW: CPT

## 2025-08-15 ENCOUNTER — APPOINTMENT (OUTPATIENT)
Dept: MRI IMAGING | Facility: CLINIC | Age: 55
End: 2025-08-15

## 2025-08-16 ENCOUNTER — APPOINTMENT (OUTPATIENT)
Dept: MRI IMAGING | Facility: CLINIC | Age: 55
End: 2025-08-16

## 2025-08-26 ENCOUNTER — APPOINTMENT (OUTPATIENT)
Dept: MRI IMAGING | Facility: CLINIC | Age: 55
End: 2025-08-26

## 2025-08-26 ENCOUNTER — OUTPATIENT (OUTPATIENT)
Dept: OUTPATIENT SERVICES | Facility: HOSPITAL | Age: 55
LOS: 1 days | End: 2025-08-26
Payer: MEDICARE

## 2025-08-26 DIAGNOSIS — M47.812 SPONDYLOSIS WITHOUT MYELOPATHY OR RADICULOPATHY, CERVICAL REGION: ICD-10-CM

## 2025-08-26 DIAGNOSIS — Z98.1 ARTHRODESIS STATUS: Chronic | ICD-10-CM

## 2025-08-26 DIAGNOSIS — Z90.711 ACQUIRED ABSENCE OF UTERUS WITH REMAINING CERVICAL STUMP: Chronic | ICD-10-CM

## 2025-08-26 PROCEDURE — 72141 MRI NECK SPINE W/O DYE: CPT | Mod: 26

## 2025-08-26 PROCEDURE — 72141 MRI NECK SPINE W/O DYE: CPT

## 2025-08-28 ENCOUNTER — APPOINTMENT (OUTPATIENT)
Dept: ORTHOPEDIC SURGERY | Facility: CLINIC | Age: 55
End: 2025-08-28
Payer: MEDICARE

## 2025-08-28 DIAGNOSIS — Z98.1 ARTHRODESIS STATUS: ICD-10-CM

## 2025-08-28 DIAGNOSIS — M47.812 SPONDYLOSIS W/OUT MYELOPATHY OR RADICULOPATHY, CERVICAL REGION: ICD-10-CM

## 2025-08-28 DIAGNOSIS — M77.8 OTHER ENTHESOPATHIES, NOT ELSEWHERE CLASSIFIED: ICD-10-CM

## 2025-08-28 DIAGNOSIS — M25.50 PAIN IN UNSPECIFIED JOINT: ICD-10-CM

## 2025-08-28 DIAGNOSIS — M54.12 RADICULOPATHY, CERVICAL REGION: ICD-10-CM

## 2025-08-28 DIAGNOSIS — M51.369: ICD-10-CM

## 2025-08-28 PROCEDURE — 99214 OFFICE O/P EST MOD 30 MIN: CPT

## 2025-08-29 ENCOUNTER — APPOINTMENT (OUTPATIENT)
Dept: ORTHOPEDIC SURGERY | Facility: CLINIC | Age: 55
End: 2025-08-29
Payer: MEDICARE

## 2025-08-29 VITALS
HEIGHT: 63 IN | BODY MASS INDEX: 34.91 KG/M2 | SYSTOLIC BLOOD PRESSURE: 128 MMHG | WEIGHT: 197 LBS | DIASTOLIC BLOOD PRESSURE: 83 MMHG

## 2025-08-29 PROCEDURE — 99215 OFFICE O/P EST HI 40 MIN: CPT

## 2025-08-29 PROCEDURE — G2211 COMPLEX E/M VISIT ADD ON: CPT

## 2025-09-03 ENCOUNTER — APPOINTMENT (OUTPATIENT)
Dept: PHYSICAL MEDICINE AND REHAB | Facility: CLINIC | Age: 55
End: 2025-09-03

## 2025-09-03 ENCOUNTER — NON-APPOINTMENT (OUTPATIENT)
Age: 55
End: 2025-09-03

## 2025-09-04 ENCOUNTER — APPOINTMENT (OUTPATIENT)
Dept: ORTHOPEDIC SURGERY | Facility: CLINIC | Age: 55
End: 2025-09-04
Payer: MEDICARE

## 2025-09-04 VITALS
SYSTOLIC BLOOD PRESSURE: 123 MMHG | WEIGHT: 19 LBS | BODY MASS INDEX: 3.37 KG/M2 | HEIGHT: 63 IN | DIASTOLIC BLOOD PRESSURE: 81 MMHG

## 2025-09-04 DIAGNOSIS — M25.612 STIFFNESS OF LEFT SHOULDER, NOT ELSEWHERE CLASSIFIED: ICD-10-CM

## 2025-09-04 DIAGNOSIS — M75.82 OTHER SHOULDER LESIONS, LEFT SHOULDER: ICD-10-CM

## 2025-09-04 DIAGNOSIS — G56.02 CARPAL TUNNEL SYNDROME, LEFT UPPER LIMB: ICD-10-CM

## 2025-09-04 PROCEDURE — 99214 OFFICE O/P EST MOD 30 MIN: CPT | Mod: 25

## 2025-09-04 PROCEDURE — 20610 DRAIN/INJ JOINT/BURSA W/O US: CPT | Mod: LT,59

## 2025-09-10 ENCOUNTER — APPOINTMENT (OUTPATIENT)
Dept: CT IMAGING | Facility: CLINIC | Age: 55
End: 2025-09-10
Payer: MEDICARE

## 2025-09-10 PROCEDURE — 73700 CT LOWER EXTREMITY W/O DYE: CPT | Mod: 26,RT

## 2025-09-12 PROBLEM — Z91.89 OTHER SPECIFIED PERSONAL RISK FACTORS, NOT ELSEWHERE CLASSIFIED: Chronic | Status: INACTIVE | Noted: 2023-03-07 | Resolved: 2025-09-11

## 2025-09-12 PROBLEM — Z87.19 PERSONAL HISTORY OF OTHER DISEASES OF THE DIGESTIVE SYSTEM: Chronic | Status: INACTIVE | Noted: 2023-03-07 | Resolved: 2025-09-11

## 2025-09-16 ENCOUNTER — NON-APPOINTMENT (OUTPATIENT)
Age: 55
End: 2025-09-16

## (undated) DEVICE — DRAPE SPLIT SHEET 77" X 108"

## (undated) DEVICE — STRYKER SONOPET IQ TUBING SET

## (undated) DEVICE — TUBING BIPOLAR IRRIGATOR AND CORD SET

## (undated) DEVICE — GLV 6.5 PROTEXIS (BLUE)

## (undated) DEVICE — PREP DURAPREP 26CC

## (undated) DEVICE — DRAIN JACKSON PRATT 7FR ROUND END NO TROCAR

## (undated) DEVICE — SOL IRR POUR NS 0.9% 1000ML

## (undated) DEVICE — ELCTR BOVIE TIP BLADE INSULATED 4" EDGE

## (undated) DEVICE — SOL IRR POUR H2O 1000ML

## (undated) DEVICE — ELCTR ROCKER SWITCH PENCIL BLUE 10FT

## (undated) DEVICE — VENODYNE/SCD SLEEVE CALF MEDIUM

## (undated) DEVICE — DRAPE 3/4 SHEET 52X76"

## (undated) DEVICE — MARKING PEN W RULER

## (undated) DEVICE — FOLEY TRAY 16FR LF URINE METER SURESTEP

## (undated) DEVICE — SPONGE PEANUT AUTO COUNT

## (undated) DEVICE — DRAPE TOWEL BLUE 17" X 24"

## (undated) DEVICE — SUT PROLENE 3-0 36" SH

## (undated) DEVICE — SUT VICRYL 2-0 18" CP-2 UNDYED (POP-OFF)

## (undated) DEVICE — STRYKER SONOPET IQ TIP 11CM APEX KNIFE

## (undated) DEVICE — WARMING BLANKET FULL ADULT

## (undated) DEVICE — SUT VICRYL 3-0 18" SH UNDYED (POP-OFF)

## (undated) DEVICE — DRAPE INSTRUMENT POUCH 6.75" X 11"

## (undated) DEVICE — DRSG DERMABOND 0.7ML

## (undated) DEVICE — GLV 8 PROTEXIS (WHITE)

## (undated) DEVICE — DRAPE TOWEL 1000 SMALL 17" X 11"

## (undated) DEVICE — MIDAS REX CLEARVIEW IRRIGATION TUBING SET

## (undated) DEVICE — MIDAS REX LEGEND TAPERED SM BORE 1.1MM X 8CM

## (undated) DEVICE — SUT MONOCRYL 3-0 27" PS-2 UNDYED

## (undated) DEVICE — DRAPE LARGE SHEET 72X85"

## (undated) DEVICE — GLV 6.5 PROTEXIS (WHITE)

## (undated) DEVICE — DRAPE C ARM UNIVERSAL

## (undated) DEVICE — MIDAS REX CLEARVIEW MATCH HEAD FLUTED DISTAL BEND 2.5MM X 12CM

## (undated) DEVICE — GLV 8 PROTEXIS (BLUE)

## (undated) DEVICE — NDL SPINAL 18G X 3.5" (PINK)

## (undated) DEVICE — STRYKER SONOPET IQ TIP 12CM CLAW